# Patient Record
Sex: FEMALE | Race: WHITE | NOT HISPANIC OR LATINO | Employment: UNEMPLOYED | URBAN - NONMETROPOLITAN AREA
[De-identification: names, ages, dates, MRNs, and addresses within clinical notes are randomized per-mention and may not be internally consistent; named-entity substitution may affect disease eponyms.]

---

## 2017-11-16 ENCOUNTER — HOSPITAL ENCOUNTER (EMERGENCY)
Facility: HOSPITAL | Age: 22
Discharge: HOME | End: 2017-11-16
Attending: EMERGENCY MEDICINE | Admitting: EMERGENCY MEDICINE

## 2017-11-16 VITALS
TEMPERATURE: 98 F | RESPIRATION RATE: 18 BRPM | HEIGHT: 65 IN | HEART RATE: 66 BPM | DIASTOLIC BLOOD PRESSURE: 78 MMHG | OXYGEN SATURATION: 100 % | SYSTOLIC BLOOD PRESSURE: 121 MMHG | WEIGHT: 220 LBS | BODY MASS INDEX: 36.65 KG/M2

## 2017-11-16 DIAGNOSIS — R42 DIZZINESS: Primary | ICD-10-CM

## 2017-11-16 LAB
B-HCG UR QL: NEGATIVE
BACTERIA UR QL AUTO: ABNORMAL /HPF
BILIRUB UR QL STRIP: NEGATIVE
CLARITY UR: ABNORMAL
COLOR UR: YELLOW
GLUCOSE UR STRIP-MCNC: NEGATIVE MG/DL
HGB UR QL STRIP.AUTO: ABNORMAL
HYALINE CASTS UR QL AUTO: ABNORMAL /LPF
KETONES UR QL STRIP: NEGATIVE
LEUKOCYTE ESTERASE UR QL STRIP.AUTO: NEGATIVE
NITRITE UR QL STRIP: NEGATIVE
PH UR STRIP.AUTO: 6.5 [PH] (ref 5–8)
PROT UR QL STRIP: ABNORMAL
RBC # UR: ABNORMAL /HPF
REF LAB TEST METHOD: ABNORMAL
SP GR UR STRIP: 1.02 (ref 1–1.03)
SQUAMOUS #/AREA URNS HPF: ABNORMAL /HPF
UROBILINOGEN UR QL STRIP: ABNORMAL
WBC UR QL AUTO: ABNORMAL /HPF

## 2017-11-16 PROCEDURE — 99284 EMERGENCY DEPT VISIT MOD MDM: CPT

## 2017-11-16 PROCEDURE — 81025 URINE PREGNANCY TEST: CPT | Performed by: EMERGENCY MEDICINE

## 2017-11-16 PROCEDURE — 93005 ELECTROCARDIOGRAM TRACING: CPT | Performed by: EMERGENCY MEDICINE

## 2017-11-16 PROCEDURE — 81001 URINALYSIS AUTO W/SCOPE: CPT | Performed by: EMERGENCY MEDICINE

## 2017-11-16 RX ORDER — ONDANSETRON 4 MG/1
4 TABLET, ORALLY DISINTEGRATING ORAL EVERY 6 HOURS PRN
Qty: 20 TABLET | Refills: 0 | Status: SHIPPED | OUTPATIENT
Start: 2017-11-16 | End: 2018-07-05

## 2017-11-16 RX ORDER — MECLIZINE HYDROCHLORIDE 25 MG/1
50 TABLET ORAL ONCE
Status: COMPLETED | OUTPATIENT
Start: 2017-11-16 | End: 2017-11-16

## 2017-11-16 RX ORDER — ONDANSETRON 4 MG/1
4 TABLET, ORALLY DISINTEGRATING ORAL ONCE
Status: COMPLETED | OUTPATIENT
Start: 2017-11-16 | End: 2017-11-16

## 2017-11-16 RX ORDER — MECLIZINE HYDROCHLORIDE 25 MG/1
50 TABLET ORAL 3 TIMES DAILY PRN
Qty: 20 TABLET | Refills: 0 | Status: SHIPPED | OUTPATIENT
Start: 2017-11-16 | End: 2018-07-05

## 2017-11-16 RX ADMIN — MECLIZINE HYDROCHLORIDE 50 MG: 25 TABLET ORAL at 12:04

## 2017-11-16 RX ADMIN — ONDANSETRON 4 MG: 4 TABLET, ORALLY DISINTEGRATING ORAL at 12:03

## 2017-11-16 NOTE — ED PROVIDER NOTES
"Subjective   HPI Comments: 22-year-old female presenting with dizziness and concerned about her blood pressure.  She states that prior to arrival she was sitting at work when she had sudden onset of dizziness, nausea.  Her coworker who is a CNA checked her blood pressure and it was 140/109.  They looked this up on the Internet and found that this was \"an extreme emergency and we needed to get help immediately\".  This made her very anxious so she came here for evaluation.  She is feeling somewhat better at this time.  She denies any headache, vomiting, chest pain, shortness of breath, numbness, weakness, neck pain.  She is never had symptoms like this before.  She does tell me that she is currently getting over a cold, it she's had congestion and cough for about a week.      Review of Systems   Constitutional: Negative for chills and fever.   HENT: Positive for congestion and rhinorrhea. Negative for sore throat.    Eyes: Negative for pain.   Respiratory: Negative for cough and shortness of breath.    Cardiovascular: Negative for chest pain, palpitations and leg swelling.   Gastrointestinal: Positive for nausea. Negative for abdominal pain, diarrhea and vomiting.   Genitourinary: Negative for dysuria.   Musculoskeletal: Negative for arthralgias.   Skin: Negative for rash.   Neurological: Positive for dizziness and light-headedness. Negative for seizures, syncope, facial asymmetry, speech difficulty, weakness, numbness and headaches.   Psychiatric/Behavioral: Negative for behavioral problems.       Past Medical History:   Diagnosis Date   • UTI (urinary tract infection)        No Known Allergies    History reviewed. No pertinent surgical history.    History reviewed. No pertinent family history.    Social History     Social History   • Marital status: Single     Spouse name: N/A   • Number of children: N/A   • Years of education: N/A     Social History Main Topics   • Smoking status: Never Smoker   • Smokeless " tobacco: None   • Alcohol use Yes      Comment: socially    • Drug use: No   • Sexual activity: Not Asked     Other Topics Concern   • None     Social History Narrative   • None           Objective   Physical Exam   Constitutional: She is oriented to person, place, and time. She appears well-developed and well-nourished. No distress.   HENT:   Head: Normocephalic and atraumatic.   Right Ear: External ear normal.   Left Ear: External ear normal.   Nose: Nose normal.   Mouth/Throat: Oropharynx is clear and moist.   TMs with small serous effusions, mild congestion   Eyes: Conjunctivae and EOM are normal. Pupils are equal, round, and reactive to light.   Neck: Normal range of motion. Neck supple.   Cardiovascular: Normal rate, regular rhythm, normal heart sounds and intact distal pulses.    Pulmonary/Chest: Effort normal and breath sounds normal. No respiratory distress.   Abdominal: Soft. Bowel sounds are normal. She exhibits no distension. There is no tenderness. There is no rebound and no guarding.   Musculoskeletal: Normal range of motion. She exhibits no edema, tenderness or deformity.   Neurological: She is alert and oriented to person, place, and time.   CN intact, normal strength and sensation bilateral upper and lower extremities, coordination is normal, HINTs exam unremarkable   Skin: Skin is warm and dry. No rash noted.   Psychiatric: She has a normal mood and affect. Her behavior is normal.   Nursing note and vitals reviewed.      Procedures         ED Course  ED Course                  MDM  Number of Diagnoses or Management Options  Dizziness:   Diagnosis management comments: 22-year-old female with dizziness and questionable hypertension.  We'll develop, well-nourished obese young female in no distress with normal vital signs here including a normal blood pressure, her exam is otherwise nonfocal including her neurologic exam.  Her story is consistent with some sort of peripheral vertigo.  I think this is  likely due to her upper respiratory infection.  We'll treat symptomatically and check a pregnancy test.  At this time I feel no other imaging or evaluation is warranted.  Disposition is likely to home.    DDX: BPPV, viral illness, vertigo, hypertension    EKG: Sinus rhythm, normal rate, normal axis/intervals, no acute ST/T changes    She is feeling much better.  At this time until she is safe for discharge home.  Encouraged her to follow closely with her primary doctor.  Return precautions discussed.      Final diagnoses:   Dizziness            Anton Martinez MD  11/16/17 2892

## 2017-11-16 NOTE — DISCHARGE INSTRUCTIONS

## 2018-06-06 ENCOUNTER — TELEPHONE (OUTPATIENT)
Dept: URGENT CARE | Facility: CLINIC | Age: 23
End: 2018-06-06

## 2018-06-06 DIAGNOSIS — A59.01 TRICHOMONAS VAGINITIS: Primary | ICD-10-CM

## 2018-06-06 RX ORDER — METRONIDAZOLE 500 MG/1
500 TABLET ORAL 2 TIMES DAILY
Qty: 14 TABLET | Refills: 0 | Status: SHIPPED | OUTPATIENT
Start: 2018-06-06 | End: 2018-07-05

## 2018-07-16 ENCOUNTER — TELEPHONE (OUTPATIENT)
Dept: URGENT CARE | Facility: CLINIC | Age: 23
End: 2018-07-16

## 2018-07-16 DIAGNOSIS — A59.9 TRICHOMONAS INFECTION: Primary | ICD-10-CM

## 2018-07-16 DIAGNOSIS — B37.31 VAGINAL CANDIDIASIS: ICD-10-CM

## 2018-07-16 RX ORDER — FLUCONAZOLE 150 MG/1
TABLET ORAL
Qty: 3 TABLET | Refills: 0 | Status: SHIPPED | OUTPATIENT
Start: 2018-07-16 | End: 2018-09-18

## 2018-07-16 RX ORDER — METRONIDAZOLE 500 MG/1
500 TABLET ORAL 2 TIMES DAILY
Qty: 14 TABLET | Refills: 0 | Status: SHIPPED | OUTPATIENT
Start: 2018-07-16 | End: 2018-09-18

## 2018-07-20 ENCOUNTER — TELEPHONE (OUTPATIENT)
Dept: URGENT CARE | Facility: CLINIC | Age: 23
End: 2018-07-20

## 2018-09-18 ENCOUNTER — OFFICE VISIT (OUTPATIENT)
Dept: INTERNAL MEDICINE | Facility: CLINIC | Age: 23
End: 2018-09-18

## 2018-09-18 VITALS
WEIGHT: 204 LBS | SYSTOLIC BLOOD PRESSURE: 108 MMHG | HEART RATE: 82 BPM | OXYGEN SATURATION: 99 % | DIASTOLIC BLOOD PRESSURE: 76 MMHG | BODY MASS INDEX: 34.83 KG/M2 | TEMPERATURE: 98.7 F | HEIGHT: 64 IN

## 2018-09-18 DIAGNOSIS — N89.8 VAGINAL DISCHARGE: Primary | ICD-10-CM

## 2018-09-18 DIAGNOSIS — R30.0 DYSURIA: ICD-10-CM

## 2018-09-18 DIAGNOSIS — N92.6 IRREGULAR MENSES: ICD-10-CM

## 2018-09-18 DIAGNOSIS — E28.2 PCOS (POLYCYSTIC OVARIAN SYNDROME): ICD-10-CM

## 2018-09-18 DIAGNOSIS — Z86.19 HISTORY OF TRICHOMONAL VAGINITIS: ICD-10-CM

## 2018-09-18 DIAGNOSIS — Z00.00 PREVENTATIVE HEALTH CARE: ICD-10-CM

## 2018-09-18 LAB
BILIRUB BLD-MCNC: NEGATIVE MG/DL
CLARITY, POC: ABNORMAL
COLOR UR: YELLOW
GLUCOSE UR STRIP-MCNC: NEGATIVE MG/DL
KETONES UR QL: NEGATIVE
LEUKOCYTE EST, POC: ABNORMAL
NITRITE UR-MCNC: NEGATIVE MG/ML
PH UR: 6.5 [PH] (ref 5–8)
PROT UR STRIP-MCNC: ABNORMAL MG/DL
RBC # UR STRIP: ABNORMAL /UL
SP GR UR: 1.01 (ref 1–1.03)
UROBILINOGEN UR QL: NORMAL

## 2018-09-18 PROCEDURE — 81003 URINALYSIS AUTO W/O SCOPE: CPT | Performed by: PHYSICIAN ASSISTANT

## 2018-09-18 PROCEDURE — 99214 OFFICE O/P EST MOD 30 MIN: CPT | Performed by: PHYSICIAN ASSISTANT

## 2018-09-18 RX ORDER — TINIDAZOLE 500 MG/1
2 TABLET ORAL
Qty: 28 TABLET | Refills: 0 | Status: SHIPPED | OUTPATIENT
Start: 2018-09-18 | End: 2018-09-25

## 2018-09-18 NOTE — PROGRESS NOTES
Nery Ibarra is a 23 y.o. female.     Subjective   History of Present Illness   Here today with concern of dysuria, urinary frequency and green vaginal discharge for the last 5 days.  She denies vaginal odor.  She reports that she has had trichomonas 3 times in the last 4 months which has been treated with Flagyl PO each time.  Her boyfriend has been tested and was negative for trichomonas so he has not been treated. They have been using condoms 100% of the time since they got back together after she contracted trichomonas initially.     Taking Metformin for PCOS since February which was diagnosed after transvaginal ultrasound. She reports that initially Metformin seemed to regulate her menstrual cycle but that did not last long and she has resumed irregularly timed menses. Last menses was a couple of weeks ago and was abnormal in that she only had some brown spotting with her normal menstrual symptoms.           The following portions of the patient's history were reviewed and updated as appropriate: allergies, current medications, past family history, past medical history, past social history, past surgical history and problem list.    Review of Systems    Constitutional: Negative for appetite change, chills, fatigue, fever and unexpected weight change.   Respiratory: Negative for cough, chest tightness, shortness of breath and wheezing.    Cardiovascular: Negative for chest pain, palpitations and leg swelling.   Gastrointestinal: Negative for abdominal distention, abdominal pain, blood in stool, constipation, diarrhea, nausea and vomiting.   Endocrine: irregular menses.  Negative for cold intolerance, heat intolerance, polydipsia, polyphagia and polyuria.   Musculoskeletal: Negative for arthralgias, back pain, joint swelling, myalgias, neck pain and neck stiffness.   Skin: Negative for color change, pallor, rash and wound.   Allergic/Immunologic: Negative for environmental allergies, food allergies and  "immunocompromised state.   Neurological: Negative for dizziness, tremors, seizures, weakness, numbness and headaches.   Hematological: Negative for adenopathy. Does not bruise/bleed easily.   Psychiatric/Behavioral: Negative for sleep disturbances, agitation, behavioral problems, confusion, hallucinations, self-injury and suicidal ideas. The patient is not nervous/anxious.    : vaginal discharge, dysuria, frequency.     Objective    Physical Exam  Constitutional: Appears well-developed and well-nourished.   Cardiovascular: Normal rate, regular rhythm and normal heart sounds.    Pulmonary/Chest: Effort normal and breath sounds normal. No respiratory distress.  Has no wheezes or rales. Exhibits no chest wall tenderness.   Abdominal: Soft. Bowel sounds are normal. Exhibits no distension and no mass. There is no tenderness.   Musculoskeletal: Normal range of motion. Exhibits no tenderness.   Neurological: Alert and oriented to person, place, and time.   Skin: Skin is warm and dry.   Psychiatric: Has a normal mood and affect. Behavior is normal. Judgment and thought content normal.       /76   Pulse 82   Temp 98.7 °F (37.1 °C)   Ht 162.6 cm (64.02\")   Wt 92.5 kg (204 lb)   SpO2 99%   BMI 35.00 kg/m²     Nursing note and vitals reviewed.        Assessment/Plan   Nery was seen today for uti symptoms.    Diagnoses and all orders for this visit:    Vaginal discharge  -     tinidazole (TINDAMAX) 500 MG tablet; Take 4 tablets by mouth Daily With Breakfast for 7 days.  -     NuSwab VG+ - Swab, Vagina    History of trichomonal vaginitis  -     tinidazole (TINDAMAX) 500 MG tablet; Take 4 tablets by mouth Daily With Breakfast for 7 days.  -     NuSwab VG+ - Swab, Vagina  This will make the 4th time in the last 4 months. She was advised that despite her boyfriend testing negative he still needs to be treated and they should wait 7 days after both have completed their treatment before resuming intercourse. "       Dysuria  -     POCT urinalysis dipstick, automated  500 leukocytes, 250 blood.   -     Urine Culture - Urine, Urine, Clean Catch  Brief Urine Lab Results  (Last result in the past 365 days)      Color   Clarity   Blood   Leuk Est   Nitrite   Protein   CREAT   Urine HCG        09/18/18 1736 Yellow Cloudy(A) 50 [place]/ul(A) 500 [place]/ul(A) Negative Trace(A)               PCOS (polycystic ovarian syndrome)  -     Continue: metFORMIN (GLUCOPHAGE) 500 MG tablet; Take 1 tablet by mouth 2 (Two) Times a Day With Meals.  -     Comprehensive Metabolic Panel  -     Testosterone  -     Insulin, Total  -     Hemoglobin A1c  -     TSH    Preventative health care  -     Lipid Panel    Irregular menses  -     TSH

## 2018-09-24 DIAGNOSIS — A59.9 TRICHOMONIASIS: Primary | ICD-10-CM

## 2018-09-24 LAB
A VAGINAE DNA VAG QL NAA+PROBE: ABNORMAL SCORE
BACTERIA UR CULT: ABNORMAL
BACTERIA UR CULT: ABNORMAL
BVAB2 DNA VAG QL NAA+PROBE: ABNORMAL SCORE
C ALBICANS DNA VAG QL NAA+PROBE: NEGATIVE
C GLABRATA DNA VAG QL NAA+PROBE: NEGATIVE
C TRACH RRNA SPEC QL NAA+PROBE: NEGATIVE
MEGA1 DNA VAG QL NAA+PROBE: ABNORMAL SCORE
N GONORRHOEA RRNA SPEC QL NAA+PROBE: NEGATIVE
OTHER ANTIBIOTIC SUSC ISLT: ABNORMAL
T VAGINALIS RRNA SPEC QL NAA+PROBE: POSITIVE

## 2018-09-27 ENCOUNTER — TELEPHONE (OUTPATIENT)
Dept: INTERNAL MEDICINE | Facility: CLINIC | Age: 23
End: 2018-09-27

## 2018-10-01 NOTE — TELEPHONE ENCOUNTER
Pt said it only happened 2 times that day, she is fine now, told her to call if any other problems

## 2018-10-08 ENCOUNTER — TELEPHONE (OUTPATIENT)
Dept: INTERNAL MEDICINE | Facility: CLINIC | Age: 23
End: 2018-10-08

## 2018-10-18 ENCOUNTER — APPOINTMENT (OUTPATIENT)
Dept: ULTRASOUND IMAGING | Facility: HOSPITAL | Age: 23
End: 2018-10-18

## 2018-10-18 ENCOUNTER — HOSPITAL ENCOUNTER (EMERGENCY)
Facility: HOSPITAL | Age: 23
Discharge: HOME | End: 2018-10-18
Attending: EMERGENCY MEDICINE | Admitting: EMERGENCY MEDICINE

## 2018-10-18 ENCOUNTER — TELEPHONE (OUTPATIENT)
Dept: INTERNAL MEDICINE | Facility: CLINIC | Age: 23
End: 2018-10-18

## 2018-10-18 VITALS
TEMPERATURE: 97.8 F | HEART RATE: 71 BPM | WEIGHT: 210 LBS | OXYGEN SATURATION: 99 % | RESPIRATION RATE: 17 BRPM | SYSTOLIC BLOOD PRESSURE: 138 MMHG | DIASTOLIC BLOOD PRESSURE: 92 MMHG | BODY MASS INDEX: 35.85 KG/M2 | HEIGHT: 64 IN

## 2018-10-18 DIAGNOSIS — R10.13 EPIGASTRIC PAIN: Primary | ICD-10-CM

## 2018-10-18 LAB
ALBUMIN SERPL-MCNC: 5 G/DL (ref 3.5–5)
ALBUMIN/GLOB SERPL: 1.5 G/DL (ref 1–2)
ALP SERPL-CCNC: 65 U/L (ref 38–126)
ALT SERPL W P-5'-P-CCNC: 44 U/L (ref 13–69)
ANION GAP SERPL CALCULATED.3IONS-SCNC: 11.9 MMOL/L (ref 10–20)
AST SERPL-CCNC: 38 U/L (ref 15–46)
B-HCG UR QL: NEGATIVE
BACTERIA UR QL AUTO: ABNORMAL /HPF
BASOPHILS # BLD AUTO: 0.09 10*3/MM3 (ref 0–0.2)
BASOPHILS NFR BLD AUTO: 0.8 % (ref 0–2.5)
BILIRUB SERPL-MCNC: 0.7 MG/DL (ref 0.2–1.3)
BILIRUB UR QL STRIP: ABNORMAL
BUN BLD-MCNC: 11 MG/DL (ref 7–20)
BUN/CREAT SERPL: 15.7 (ref 7.1–23.5)
CALCIUM SPEC-SCNC: 10.2 MG/DL (ref 8.4–10.2)
CHLORIDE SERPL-SCNC: 107 MMOL/L (ref 98–107)
CLARITY UR: ABNORMAL
CO2 SERPL-SCNC: 25 MMOL/L (ref 26–30)
COLOR UR: YELLOW
CREAT BLD-MCNC: 0.7 MG/DL (ref 0.6–1.3)
DEPRECATED RDW RBC AUTO: 36 FL (ref 37–54)
EOSINOPHIL # BLD AUTO: 1.15 10*3/MM3 (ref 0–0.7)
EOSINOPHIL NFR BLD AUTO: 10 % (ref 0–7)
ERYTHROCYTE [DISTWIDTH] IN BLOOD BY AUTOMATED COUNT: 11.6 % (ref 11.5–14.5)
GFR SERPL CREATININE-BSD FRML MDRD: 105 ML/MIN/1.73
GLOBULIN UR ELPH-MCNC: 3.3 GM/DL
GLUCOSE BLD-MCNC: 99 MG/DL (ref 74–98)
GLUCOSE UR STRIP-MCNC: NEGATIVE MG/DL
HCT VFR BLD AUTO: 44.1 % (ref 37–47)
HGB BLD-MCNC: 15.3 G/DL (ref 12–16)
HGB UR QL STRIP.AUTO: ABNORMAL
HYALINE CASTS UR QL AUTO: ABNORMAL /LPF
IMM GRANULOCYTES # BLD: 0.03 10*3/MM3 (ref 0–0.06)
IMM GRANULOCYTES NFR BLD: 0.3 % (ref 0–0.6)
KETONES UR QL STRIP: ABNORMAL
LEUKOCYTE ESTERASE UR QL STRIP.AUTO: NEGATIVE
LIPASE SERPL-CCNC: 91 U/L (ref 23–300)
LYMPHOCYTES # BLD AUTO: 3.73 10*3/MM3 (ref 0.6–3.4)
LYMPHOCYTES NFR BLD AUTO: 32.5 % (ref 10–50)
MCH RBC QN AUTO: 29.7 PG (ref 27–31)
MCHC RBC AUTO-ENTMCNC: 34.7 G/DL (ref 30–37)
MCV RBC AUTO: 85.5 FL (ref 81–99)
MONOCYTES # BLD AUTO: 0.72 10*3/MM3 (ref 0–0.9)
MONOCYTES NFR BLD AUTO: 6.3 % (ref 0–12)
NEUTROPHILS # BLD AUTO: 5.76 10*3/MM3 (ref 2–6.9)
NEUTROPHILS NFR BLD AUTO: 50.1 % (ref 37–80)
NITRITE UR QL STRIP: NEGATIVE
NRBC BLD MANUAL-RTO: 0 /100 WBC (ref 0–0)
PH UR STRIP.AUTO: 5.5 [PH] (ref 5–8)
PLATELET # BLD AUTO: 318 10*3/MM3 (ref 130–400)
PMV BLD AUTO: 9.9 FL (ref 6–12)
POTASSIUM BLD-SCNC: 3.9 MMOL/L (ref 3.5–5.1)
PROT SERPL-MCNC: 8.3 G/DL (ref 6.3–8.2)
PROT UR QL STRIP: ABNORMAL
RBC # BLD AUTO: 5.16 10*6/MM3 (ref 4.2–5.4)
RBC # UR: ABNORMAL /HPF
REF LAB TEST METHOD: ABNORMAL
SODIUM BLD-SCNC: 140 MMOL/L (ref 137–145)
SP GR UR STRIP: >=1.03 (ref 1–1.03)
SQUAMOUS #/AREA URNS HPF: ABNORMAL /HPF
UROBILINOGEN UR QL STRIP: ABNORMAL
WBC NRBC COR # BLD: 11.48 10*3/MM3 (ref 4.8–10.8)
WBC UR QL AUTO: ABNORMAL /HPF

## 2018-10-18 PROCEDURE — 80053 COMPREHEN METABOLIC PANEL: CPT | Performed by: EMERGENCY MEDICINE

## 2018-10-18 PROCEDURE — 25010000002 ONDANSETRON PER 1 MG: Performed by: EMERGENCY MEDICINE

## 2018-10-18 PROCEDURE — 99283 EMERGENCY DEPT VISIT LOW MDM: CPT

## 2018-10-18 PROCEDURE — 96375 TX/PRO/DX INJ NEW DRUG ADDON: CPT

## 2018-10-18 PROCEDURE — 83690 ASSAY OF LIPASE: CPT | Performed by: EMERGENCY MEDICINE

## 2018-10-18 PROCEDURE — 81001 URINALYSIS AUTO W/SCOPE: CPT | Performed by: EMERGENCY MEDICINE

## 2018-10-18 PROCEDURE — 76705 ECHO EXAM OF ABDOMEN: CPT

## 2018-10-18 PROCEDURE — 96374 THER/PROPH/DIAG INJ IV PUSH: CPT

## 2018-10-18 PROCEDURE — 81025 URINE PREGNANCY TEST: CPT | Performed by: EMERGENCY MEDICINE

## 2018-10-18 PROCEDURE — 85025 COMPLETE CBC W/AUTO DIFF WBC: CPT | Performed by: EMERGENCY MEDICINE

## 2018-10-18 RX ORDER — FAMOTIDINE 10 MG/ML
20 INJECTION, SOLUTION INTRAVENOUS ONCE
Status: COMPLETED | OUTPATIENT
Start: 2018-10-18 | End: 2018-10-18

## 2018-10-18 RX ORDER — ONDANSETRON 2 MG/ML
4 INJECTION INTRAMUSCULAR; INTRAVENOUS ONCE
Status: COMPLETED | OUTPATIENT
Start: 2018-10-18 | End: 2018-10-18

## 2018-10-18 RX ORDER — FAMOTIDINE 20 MG/1
20 TABLET, FILM COATED ORAL ONCE
Status: DISCONTINUED | OUTPATIENT
Start: 2018-10-18 | End: 2018-10-18

## 2018-10-18 RX ORDER — FAMOTIDINE 40 MG/1
40 TABLET, FILM COATED ORAL DAILY
Qty: 14 TABLET | Refills: 0 | Status: SHIPPED | OUTPATIENT
Start: 2018-10-18 | End: 2018-10-18

## 2018-10-18 RX ORDER — ALUMINA, MAGNESIA, AND SIMETHICONE 2400; 2400; 240 MG/30ML; MG/30ML; MG/30ML
15 SUSPENSION ORAL ONCE
Status: COMPLETED | OUTPATIENT
Start: 2018-10-18 | End: 2018-10-18

## 2018-10-18 RX ADMIN — ALUMINUM HYDROXIDE, MAGNESIUM HYDROXIDE, AND DIMETHICONE 15 ML: 400; 400; 40 SUSPENSION ORAL at 15:19

## 2018-10-18 RX ADMIN — LIDOCAINE HYDROCHLORIDE 15 ML: 20 SOLUTION ORAL; TOPICAL at 15:18

## 2018-10-18 RX ADMIN — ONDANSETRON 4 MG: 2 INJECTION INTRAMUSCULAR; INTRAVENOUS at 15:43

## 2018-10-18 RX ADMIN — FAMOTIDINE 20 MG: 10 INJECTION, SOLUTION INTRAVENOUS at 16:29

## 2018-10-18 NOTE — ED PROVIDER NOTES
Subjective   History of Present Illness  23-year-old female otherwise healthy presenting with 3 days of constant, heaviness in her epigastrium and right upper quadrant with associated nausea.  No other associated symptoms.  Never had similar symptoms previously.  Currently on her menstrual period.  No prior abdominal surgeries.    Review of Systems   Gastrointestinal: Positive for abdominal pain and nausea.   All other systems reviewed and are negative.      Past Medical History:   Diagnosis Date   • UTI (urinary tract infection)        No Known Allergies    History reviewed. No pertinent surgical history.    Family History   Problem Relation Age of Onset   • Adopted: Yes       Social History     Social History   • Marital status: Single     Social History Main Topics   • Smoking status: Never Smoker   • Smokeless tobacco: Never Used   • Alcohol use Yes      Comment: socially    • Drug use: No     Other Topics Concern   • Not on file           Objective   Physical Exam   Constitutional: She is oriented to person, place, and time. She appears well-developed and well-nourished. No distress.   HENT:   Head: Normocephalic.   Mouth/Throat: Oropharynx is clear and moist. No oropharyngeal exudate.   Eyes: No scleral icterus.   Neck: Neck supple. No tracheal deviation present.   Cardiovascular: Normal rate, regular rhythm, normal heart sounds and intact distal pulses.  Exam reveals no gallop and no friction rub.    No murmur heard.  Pulmonary/Chest: Effort normal and breath sounds normal. No stridor. No respiratory distress. She has no wheezes. She has no rales. She exhibits no tenderness.   Abdominal: Soft. She exhibits no distension and no mass. There is tenderness (epigastrum). There is no rebound and no guarding. No hernia.   Musculoskeletal: She exhibits no edema or deformity.   Neurological: She is alert and oriented to person, place, and time.   Skin: Skin is warm and dry. She is not diaphoretic. No erythema. No  pallor.   Psychiatric: She has a normal mood and affect. Her behavior is normal.   Nursing note and vitals reviewed.      Procedures           ED Course                  MDM  23-year-old female here with epigastric pain.  Afebrile, vital signs stable.  Tender to palpation in her epigastrium.  Low suspicion for emergent or surgical abdominal process.  May have some element of biliary colic versus gastritis versus esophagitis versus other. No hx to suggest cardiac cause. Plan for labs, ultrasound gallbladder, symptomatically treatment and reassessment.    4:10 PM labs and ultrasound are unremarkable.  Patient does have some bacteria in the urine but appears contaminated and there are no white blood cells.  Pain is improving.  Given the reassuring workup, exam, history, will discharge home with strict return to care precautions and prescription for Pepcid.    Final diagnoses:   Epigastric pain            Massimo Dick MD  10/18/18 0251

## 2019-02-04 ENCOUNTER — APPOINTMENT (OUTPATIENT)
Dept: ULTRASOUND IMAGING | Facility: HOSPITAL | Age: 24
End: 2019-02-04

## 2019-02-04 ENCOUNTER — HOSPITAL ENCOUNTER (EMERGENCY)
Facility: HOSPITAL | Age: 24
Discharge: HOME | End: 2019-02-04
Attending: EMERGENCY MEDICINE | Admitting: EMERGENCY MEDICINE

## 2019-02-04 VITALS
RESPIRATION RATE: 16 BRPM | DIASTOLIC BLOOD PRESSURE: 78 MMHG | BODY MASS INDEX: 36.89 KG/M2 | TEMPERATURE: 98 F | HEART RATE: 71 BPM | HEIGHT: 65 IN | SYSTOLIC BLOOD PRESSURE: 118 MMHG | OXYGEN SATURATION: 99 % | WEIGHT: 221.4 LBS

## 2019-02-04 DIAGNOSIS — O36.80X0 PREGNANCY OF UNKNOWN ANATOMIC LOCATION: Primary | ICD-10-CM

## 2019-02-04 LAB
BACTERIA UR QL AUTO: ABNORMAL /HPF
BILIRUB UR QL STRIP: NEGATIVE
CLARITY UR: ABNORMAL
COLOR UR: YELLOW
GLUCOSE UR STRIP-MCNC: NEGATIVE MG/DL
HCG INTACT+B SERPL-ACNC: 369.12 MIU/ML
HGB UR QL STRIP.AUTO: NEGATIVE
HYALINE CASTS UR QL AUTO: ABNORMAL /LPF
KETONES UR QL STRIP: NEGATIVE
LEUKOCYTE ESTERASE UR QL STRIP.AUTO: NEGATIVE
MUCOUS THREADS URNS QL MICRO: ABNORMAL /HPF
NITRITE UR QL STRIP: NEGATIVE
PH UR STRIP.AUTO: 6.5 [PH] (ref 5–8)
PROT UR QL STRIP: ABNORMAL
RBC # UR: ABNORMAL /HPF
REF LAB TEST METHOD: ABNORMAL
SP GR UR STRIP: 1.02 (ref 1–1.03)
SQUAMOUS #/AREA URNS HPF: ABNORMAL /HPF
UROBILINOGEN UR QL STRIP: ABNORMAL
WBC UR QL AUTO: ABNORMAL /HPF

## 2019-02-04 PROCEDURE — 36415 COLL VENOUS BLD VENIPUNCTURE: CPT

## 2019-02-04 PROCEDURE — 99283 EMERGENCY DEPT VISIT LOW MDM: CPT

## 2019-02-04 PROCEDURE — 81001 URINALYSIS AUTO W/SCOPE: CPT | Performed by: EMERGENCY MEDICINE

## 2019-02-04 PROCEDURE — 84702 CHORIONIC GONADOTROPIN TEST: CPT | Performed by: EMERGENCY MEDICINE

## 2019-02-04 PROCEDURE — 76817 TRANSVAGINAL US OBSTETRIC: CPT

## 2019-02-04 NOTE — ED PROVIDER NOTES
Subjective   History of Present Illness   23F G1 (LMP 12/24) presenting with 2 days of low crampy lower abdominal pain and a positive pregnancy test last night.  Denies any other specific symptoms.  Essentially, denies any fevers, chills, nausea, vomiting, diarrhea, dysuria, frequency, vaginal bleeding or discharge.    Review of Systems   Gastrointestinal: Positive for abdominal pain.   All other systems reviewed and are negative.      Past Medical History:   Diagnosis Date   • PCOS (polycystic ovarian syndrome)    • UTI (urinary tract infection)        No Known Allergies    History reviewed. No pertinent surgical history.    Family History   Adopted: Yes       Social History     Socioeconomic History   • Marital status: Single     Spouse name: Not on file   • Number of children: Not on file   • Years of education: Not on file   • Highest education level: Not on file   Tobacco Use   • Smoking status: Never Smoker   • Smokeless tobacco: Never Used   Substance and Sexual Activity   • Alcohol use: Yes     Comment: socially    • Drug use: No           Objective   Physical Exam   Constitutional: She is oriented to person, place, and time. She appears well-developed and well-nourished.  Non-toxic appearance. She does not appear ill. No distress.   HENT:   Head: Normocephalic.   Mouth/Throat: Oropharynx is clear and moist. No oropharyngeal exudate.   Eyes: Conjunctivae are normal. No scleral icterus.   Neck: Neck supple. No tracheal deviation present.   Cardiovascular: Normal rate, regular rhythm, normal heart sounds and intact distal pulses. Exam reveals no gallop and no friction rub.   No murmur heard.  Pulmonary/Chest: Effort normal and breath sounds normal. No stridor. No respiratory distress. She has no wheezes. She has no rales. She exhibits no tenderness.   Abdominal: Soft. She exhibits no distension and no mass. There is no tenderness. There is no rebound and no guarding. No hernia.   Musculoskeletal: She exhibits  no edema or deformity.   Neurological: She is alert and oriented to person, place, and time.   Skin: Skin is warm and dry. She is not diaphoretic. No erythema. No pallor.   Psychiatric: She has a normal mood and affect. Her behavior is normal.   Nursing note and vitals reviewed.      Procedures           ED Course                  MDM  23-year-old female here with lower abdominal pain and the setting of early prexy.  Afebrile, vital signs stable.  Nontender abdomen.  Very low suspicion for any emergent cause of her pain.  However, will get an hCG, ultrasound, urinalysis, and reassess.    1:20 PM . US non-diagnostic.  Discussed with the patient that this could be a miscarriage, early pregnancy, potential ectopic pregnancy and that she needs to call and follow-up with an OB/GYN within the next 2-3 days.  Discussed strict return care precautions and will discharge home.    Final diagnoses:   Pregnancy of unknown anatomic location            Massimo Dick MD  02/04/19 0081

## 2019-02-04 NOTE — DISCHARGE INSTRUCTIONS
Your HCG levels are so low that we cannot yet determine if this is a normal pregnancy or not. You need to call Dr. Morataya to arrange follow up to get your HCG level checked again to make sure that it is increasing appropriately. They may want to do a follow up ultrasound as well.

## 2019-02-22 ENCOUNTER — INITIAL PRENATAL (OUTPATIENT)
Dept: OBSTETRICS AND GYNECOLOGY | Facility: CLINIC | Age: 24
End: 2019-02-22

## 2019-02-22 VITALS — SYSTOLIC BLOOD PRESSURE: 120 MMHG | BODY MASS INDEX: 36.94 KG/M2 | WEIGHT: 222 LBS | DIASTOLIC BLOOD PRESSURE: 60 MMHG

## 2019-02-22 DIAGNOSIS — O36.80X0 ENCOUNTER TO DETERMINE FETAL VIABILITY OF PREGNANCY, SINGLE OR UNSPECIFIED FETUS: Primary | ICD-10-CM

## 2019-02-22 DIAGNOSIS — Z3A.01 LESS THAN 8 WEEKS GESTATION OF PREGNANCY: ICD-10-CM

## 2019-02-22 DIAGNOSIS — Z34.91 NORMAL PREGNANCY, FIRST TRIMESTER: ICD-10-CM

## 2019-02-22 PROCEDURE — 99203 OFFICE O/P NEW LOW 30 MIN: CPT | Performed by: NURSE PRACTITIONER

## 2019-02-22 RX ORDER — PROMETHAZINE HYDROCHLORIDE 12.5 MG/1
12.5 TABLET ORAL EVERY 6 HOURS PRN
Qty: 30 TABLET | Refills: 0 | Status: SHIPPED | OUTPATIENT
Start: 2019-02-22 | End: 2019-05-19 | Stop reason: SDUPTHER

## 2019-02-22 NOTE — PATIENT INSTRUCTIONS
First Trimester of Pregnancy    The first trimester of pregnancy is from week 1 until the end of week 12 (months 1 through 3). During this time, your baby will begin to develop inside you. At 6-8 weeks, the eyes and face are formed, and the heartbeat can be seen on ultrasound. At the end of 12 weeks, all the baby's organs are formed. Prenatal care is all the medical care you receive before the birth of your baby. Make sure you get good prenatal care and follow all of your doctor's instructions.  HOME CARE   Medicines  · Take medicine only as told by your doctor. Some medicines are safe and some are not during pregnancy.  · Take your prenatal vitamins as told by your doctor.  · Take medicine that helps you poop (stool softener) as needed if your doctor says it is okay.  Diet  · Eat regular, healthy meals.  · Your doctor will tell you the amount of weight gain that is right for you.  · Avoid raw meat and uncooked cheese.  · If you feel sick to your stomach (nauseous) or throw up (vomit):  ¨ Eat 4 or 5 small meals a day instead of 3 large meals.  ¨ Try eating a few soda crackers.  ¨ Drink liquids between meals instead of during meals.  · If you have a hard time pooping (constipation):  ¨ Eat high-fiber foods like fresh vegetables, fruit, and whole grains.  ¨ Drink enough fluids to keep your pee (urine) clear or pale yellow.  Activity and Exercise  · Exercise only as told by your doctor. Stop exercising if you have cramps or pain in your lower belly (abdomen) or low back.  · Try to avoid standing for long periods of time. Move your legs often if you must  one place for a long time.  · Avoid heavy lifting.  · Wear low-heeled shoes. Sit and stand up straight.  · You can have sex unless your doctor tells you not to.  Relief of Pain or Discomfort  · Wear a good support bra if your breasts are sore.  · Take warm water baths (sitz baths) to soothe pain or discomfort caused by hemorrhoids. Use hemorrhoid cream if your  doctor says it is okay.  · Rest with your legs raised if you have leg cramps or low back pain.  · Wear support hose if you have puffy, bulging veins (varicose veins) in your legs. Raise (elevate) your feet for 15 minutes, 3-4 times a day. Limit salt in your diet.  Prenatal Care  · Schedule your prenatal visits by the twelfth week of pregnancy.  · Write down your questions. Take them to your prenatal visits.  · Keep all your prenatal visits as told by your doctor.  Safety  · Wear your seat belt at all times when driving.  · Make a list of emergency phone numbers. The list should include numbers for family, friends, the hospital, and police and fire departments.  General Tips  · Ask your doctor for a referral to a local prenatal class. Begin classes no later than at the start of month 6 of your pregnancy.  · Ask for help if you need counseling or help with nutrition. Your doctor can give you advice or tell you where to go for help.  · Do not use hot tubs, steam rooms, or saunas.  · Do not douche or use tampons or scented sanitary pads.  · Do not cross your legs for long periods of time.  · Avoid litter boxes and soil used by cats.  · Avoid all smoking, herbs, and alcohol. Avoid drugs not approved by your doctor.  · Do not use any tobacco products, including cigarettes, chewing tobacco, and electronic cigarettes. If you need help quitting, ask your doctor. You may get counseling or other support to help you quit.  · Visit your dentist. At home, brush your teeth with a soft toothbrush. Be gentle when you floss.  GET HELP IF:  · You are dizzy.  · You have mild cramps or pressure in your lower belly.  · You have a nagging pain in your belly area.  · You continue to feel sick to your stomach, throw up, or have watery poop (diarrhea).  · You have a bad smelling fluid coming from your vagina.  · You have pain with peeing (urination).  · You have increased puffiness (swelling) in your face, hands, legs, or ankles.  GET HELP  RIGHT AWAY IF:   · You have a fever.  · You are leaking fluid from your vagina.  · You have spotting or bleeding from your vagina.  · You have very bad belly cramping or pain.  · You gain or lose weight rapidly.  · You throw up blood. It may look like coffee grounds.  · You are around people who have [place] measles, fifth disease, or chickenpox.  · You have a very bad headache.  · You have shortness of breath.  · You have any kind of trauma, such as from a fall or a car accident.     This information is not intended to replace advice given to you by your health care provider. Make sure you discuss any questions you have with your health care provider.

## 2019-02-22 NOTE — PROGRESS NOTES
Chief Complaint   Patient presents with   • Possible Pregnancy     Patient complains of nausea and vomiting.         HPI  , 6w5d presents to our office today for initial prenatal visit.  She reports she does experience some discomforts of pregnancy including:, nausea and fatigue.    She states she and her  just recently . He is in attendance today.  They both express they are  excited about the pregnancy.    She states she had a hx of PCOS - she has lost 60 pounds.    She is taking her PNV and need refills - she does not take any other medications.   She would like something for her n/v     Past Medical History:   Diagnosis Date   • PCOS (polycystic ovarian syndrome)    • UTI (urinary tract infection)         Current Outpatient Medications:   •  Prenatal Vit-Fe Fum-FA-Omega (PRENATAL MULTI +DHA PO), Take  by mouth., Disp: , Rfl:    No Known Allergies   History reviewed. No pertinent surgical history.    Social History     Socioeconomic History   • Marital status: Single     Spouse name: Not on file   • Number of children: Not on file   • Years of education: Not on file   • Highest education level: Not on file   Social Needs   • Financial resource strain: Not on file   • Food insecurity - worry: Not on file   • Food insecurity - inability: Not on file   • Transportation needs - medical: Not on file   • Transportation needs - non-medical: Not on file   Occupational History   • Not on file   Tobacco Use   • Smoking status: Never Smoker   • Smokeless tobacco: Never Used   Substance and Sexual Activity   • Alcohol use: Yes     Comment: socially    • Drug use: No   • Sexual activity: Yes     Partners: Male     Birth control/protection: None   Other Topics Concern   • Not on file   Social History Narrative   • Not on file      Family History   Adopted: Yes       The following portions of the patient's history were reviewed as note above and updated   current medications allergies, past family history,  past medical history, past social history and past surgical history.      ROS  Pertinent items are noted in HPI, all other systems reviewed and negative      Physical Exam  /60   Wt 101 kg (222 lb)   LMP 12/26/2018 (Approximate)   BMI 36.94 kg/m²        Psych: Altert and oriented to time, place and person  Mood and affect appropriate   General: well developed; well nourished  no acute distress  Head: normocephalic  Neck: The neck is supple and the trachea is midline  Musculoskeletal: Normal gait  Full range of motion  Lungs:  breathing is unlabored  Back: Negative CVAT  Abdomen: Soft, non-tender, no organomegaly  Lower Extremities: LE: Neg edema  Genitourinary: deferred today        MDM  Impression:  Problems/Risks: Normal Pregnancy  Hx UTI's    Tests done today: Routine NOB labs / U/A Culture / (GC/CT next visit) - option for CF screening   TVS rev'd    Topics discussed: Rout NOB education including nutrition, exercise, OTCmeds, genetic screening   Written info on 1st trimester of pregnancy   preventive measures of UTI  adequate rest and fluids  encouraged questions - call prn    Tests next visit: option for NIPS / ck insurance / cost

## 2019-02-23 LAB
ABO GROUP BLD: (no result)
BASOPHILS # BLD AUTO: 0 X10E3/UL (ref 0–0.2)
BASOPHILS NFR BLD AUTO: 0 %
BLD GP AB SCN SERPL QL: NEGATIVE
EOSINOPHIL # BLD AUTO: 0.2 X10E3/UL (ref 0–0.4)
EOSINOPHIL NFR BLD AUTO: 2 %
ERYTHROCYTE [DISTWIDTH] IN BLOOD BY AUTOMATED COUNT: 12.9 % (ref 12.3–15.4)
HBV SURFACE AG SERPL QL IA: NEGATIVE
HCT VFR BLD AUTO: 40.4 % (ref 34–46.6)
HCV AB S/CO SERPL IA: <0.1 S/CO RATIO (ref 0–0.9)
HGB BLD-MCNC: 13.8 G/DL (ref 11.1–15.9)
HIV 1+2 AB+HIV1 P24 AG SERPL QL IA: NON REACTIVE
IMM GRANULOCYTES # BLD AUTO: 0 X10E3/UL (ref 0–0.1)
IMM GRANULOCYTES NFR BLD AUTO: 0 %
LYMPHOCYTES # BLD AUTO: 3.3 X10E3/UL (ref 0.7–3.1)
LYMPHOCYTES NFR BLD AUTO: 31 %
MCH RBC QN AUTO: 29.3 PG (ref 26.6–33)
MCHC RBC AUTO-ENTMCNC: 34.2 G/DL (ref 31.5–35.7)
MCV RBC AUTO: 86 FL (ref 79–97)
MONOCYTES # BLD AUTO: 0.4 X10E3/UL (ref 0.1–0.9)
MONOCYTES NFR BLD AUTO: 4 %
NEUTROPHILS # BLD AUTO: 6.5 X10E3/UL (ref 1.4–7)
NEUTROPHILS NFR BLD AUTO: 63 %
PLATELET # BLD AUTO: 299 X10E3/UL (ref 150–379)
RBC # BLD AUTO: 4.71 X10E6/UL (ref 3.77–5.28)
RH BLD: POSITIVE
RPR SER QL: NON REACTIVE
RUBV IGG SERPL IA-ACNC: 1.11 INDEX
WBC # BLD AUTO: 10.4 X10E3/UL (ref 3.4–10.8)

## 2019-02-24 LAB
APPEARANCE UR: CLEAR
BACTERIA #/AREA URNS HPF: ABNORMAL /HPF
BACTERIA UR CULT: NORMAL
BACTERIA UR CULT: NORMAL
BILIRUB UR QL STRIP: NEGATIVE
COLOR UR: YELLOW
EPI CELLS #/AREA URNS HPF: ABNORMAL /HPF
GLUCOSE UR QL: NEGATIVE
HGB UR QL STRIP: NEGATIVE
KETONES UR QL STRIP: NEGATIVE
LEUKOCYTE ESTERASE UR QL STRIP: NEGATIVE
MUCOUS THREADS URNS QL MICRO: ABNORMAL /HPF
NITRITE UR QL STRIP: NEGATIVE
PH UR STRIP: 5.5 [PH] (ref 5–8)
PROT UR QL STRIP: (no result)
RBC #/AREA URNS HPF: ABNORMAL /HPF
SP GR UR: (no result) (ref 1–1.03)
URNS CMNT MICRO: ABNORMAL
UROBILINOGEN UR STRIP-MCNC: (no result) MG/DL
WBC #/AREA URNS HPF: ABNORMAL /HPF

## 2019-03-26 ENCOUNTER — ROUTINE PRENATAL (OUTPATIENT)
Dept: OBSTETRICS AND GYNECOLOGY | Facility: CLINIC | Age: 24
End: 2019-03-26

## 2019-03-26 VITALS — WEIGHT: 230 LBS | DIASTOLIC BLOOD PRESSURE: 70 MMHG | BODY MASS INDEX: 37.12 KG/M2 | SYSTOLIC BLOOD PRESSURE: 118 MMHG

## 2019-03-26 DIAGNOSIS — Z34.91 PRENATAL CARE IN FIRST TRIMESTER: Primary | ICD-10-CM

## 2019-03-26 DIAGNOSIS — R05.3 PERSISTENT COUGH: ICD-10-CM

## 2019-03-26 PROCEDURE — 99214 OFFICE O/P EST MOD 30 MIN: CPT | Performed by: OBSTETRICS & GYNECOLOGY

## 2019-03-26 RX ORDER — BENZONATATE 100 MG/1
100 CAPSULE ORAL 3 TIMES DAILY PRN
Qty: 60 CAPSULE | Refills: 5 | Status: SHIPPED | OUTPATIENT
Start: 2019-03-26 | End: 2019-04-09

## 2019-03-26 NOTE — PROGRESS NOTES
Chief Complaint   Patient presents with   • Routine Prenatal Visit     No complaints       HPI:   Nery is a  currently at 11w2d who today reports the following:  Contractions - No; Leaking - No; Vaginal bleeding -  No; Swelling of extremities - No. Good fetal movement - No.    + persistent cough for several weeks, no fevers, dry, no congestion    ROS:  GI: Nausea - No; Constipation - No; Diarrhea - No. RUQ pain - No    Neuro: Headache - No; Visual disturbances - No.    Pertinent items are noted in HPI, all other systems reviewed and negative    Review of History:  The following portions of the patient's history were reviewed and updated as appropriate:problem list, current medications, allergies, past family history, past medical history, past social history and past surgical history.    Current Outpatient Medications on File Prior to Visit   Medication Sig Dispense Refill   • Prenatal Vit-Fe Fum-FA-Omega (PRENATAL MULTI +DHA PO) Take  by mouth.     • promethazine (PHENERGAN) 12.5 MG tablet Take 1 tablet by mouth Every 6 (Six) Hours As Needed for Nausea or Vomiting. 30 tablet 0     No current facility-administered medications on file prior to visit.        EXAM:  /70   Wt 104 kg (230 lb)   LMP 2018 (Approximate)   BMI 37.12 kg/m²     Gen: NAD, conversant  Pulm: No use of accessory muscles, normal respirations  Abdomen: Gravid, nontender, size = dates, + fetal cardiac activity  Ext: no edema, no rashes, WWP  Gait: normal for pregnancy  Psych: Mood, insight, judgement intact  SVE: Not performed     Lab Results   Component Value Date    ABO A 2019    RH Positive 2019    ABSCRN Negative 2019       Social History    Tobacco Use      Smoking status: Never Smoker      Smokeless tobacco: Never Used      I have reviewed the prenatal labs and previous ultrasounds today.    MDM:  Diagnosis: Supervision of low risk pregnancy   Persistent cough   Tests/Orders/Rx today: No orders of the  defined types were placed in this encounter.    Meds Ordered: Tessalon perles   Topics discussed: Prenatal care milestones   Cough   Tests next visit: none   Next visit: 4 week(s)     Kelvin Hinton MD  Obstetrics and Gynecology  [facility]

## 2019-04-09 ENCOUNTER — RESULTS ENCOUNTER (OUTPATIENT)
Dept: INTERNAL MEDICINE | Facility: CLINIC | Age: 24
End: 2019-04-09

## 2019-04-09 ENCOUNTER — OFFICE VISIT (OUTPATIENT)
Dept: INTERNAL MEDICINE | Facility: CLINIC | Age: 24
End: 2019-04-09

## 2019-04-09 VITALS
OXYGEN SATURATION: 99 % | DIASTOLIC BLOOD PRESSURE: 70 MMHG | RESPIRATION RATE: 18 BRPM | HEART RATE: 89 BPM | WEIGHT: 230 LBS | BODY MASS INDEX: 37.12 KG/M2 | TEMPERATURE: 98.2 F | SYSTOLIC BLOOD PRESSURE: 122 MMHG

## 2019-04-09 DIAGNOSIS — R39.9 URINARY SYMPTOM OR SIGN: ICD-10-CM

## 2019-04-09 DIAGNOSIS — R30.0 DYSURIA: ICD-10-CM

## 2019-04-09 DIAGNOSIS — J45.30 MILD PERSISTENT ASTHMA WITHOUT COMPLICATION: ICD-10-CM

## 2019-04-09 DIAGNOSIS — K21.9 GASTROESOPHAGEAL REFLUX DISEASE, ESOPHAGITIS PRESENCE NOT SPECIFIED: Primary | ICD-10-CM

## 2019-04-09 LAB
BILIRUB BLD-MCNC: NEGATIVE MG/DL
CLARITY, POC: ABNORMAL
COLOR UR: YELLOW
GLUCOSE UR STRIP-MCNC: NEGATIVE MG/DL
KETONES UR QL: NEGATIVE
LEUKOCYTE EST, POC: ABNORMAL
NITRITE UR-MCNC: NEGATIVE MG/ML
PH UR: 7 [PH] (ref 5–8)
PROT UR STRIP-MCNC: ABNORMAL MG/DL
RBC # UR STRIP: NEGATIVE /UL
SP GR UR: 1.01 (ref 1–1.03)
UROBILINOGEN UR QL: ABNORMAL

## 2019-04-09 PROCEDURE — 99214 OFFICE O/P EST MOD 30 MIN: CPT | Performed by: PHYSICIAN ASSISTANT

## 2019-04-09 RX ORDER — RANITIDINE 150 MG/1
150 TABLET ORAL DAILY PRN
Qty: 30 TABLET | Refills: 2 | Status: SHIPPED | OUTPATIENT
Start: 2019-04-09 | End: 2019-04-09

## 2019-04-09 RX ORDER — RANITIDINE 15 MG/ML
150 SYRUP ORAL DAILY
Qty: 300 ML | Refills: 2 | Status: SHIPPED | OUTPATIENT
Start: 2019-04-09 | End: 2019-08-27 | Stop reason: SDUPTHER

## 2019-04-09 NOTE — PROGRESS NOTES
Subjective     Chief Complaint   Patient presents with   • Cough     heartburn,uti       History of Present Illness     Nery Ibarra is a 23 y.o. female presenting with complaints of heartburn. She is currently taking tums. Symptoms worsened by most foods and drinks. She is currently 13 weeks pregnant.    She is also complaining of intermittent dysuria and vaginal itching. She was seen by an urgent care earlier in the month and diagnosed with vaginal yeast infection. Discharge has mostly improved. She denies any fevers, chills, flank pain, hematuria. Denies any odor.    She is also complaining of worsened asthma due to seasonal allergies. She is using antihistamine as needed. Complaining of cough, shortness of breath and wheezing. Has needed inhalers in the past but all were stopped due to pregnancy. Symptoms are worse at nighttime.      The following portions of the patient's history were reviewed and updated as appropriate: current medications, allergies, PMH.    Review of Systems   Constitutional: Negative for appetite change, chills, fatigue, fever and unexpected weight change.   HENT: Negative for congestion, ear pain, hearing loss, nosebleeds, sinus pressure, sore throat, tinnitus and trouble swallowing.    Eyes: Negative for pain, discharge, redness, itching and visual disturbance.   Respiratory: Positive for cough, shortness of breath and wheezing. Negative for chest tightness.    Cardiovascular: Negative for chest pain, palpitations and leg swelling.   Gastrointestinal: Negative for abdominal pain, blood in stool, constipation, diarrhea, nausea and vomiting.        Reflux.   Endocrine: Negative for cold intolerance, heat intolerance, polydipsia, polyphagia and polyuria.   Genitourinary: Positive for dysuria and vaginal discharge. Negative for decreased urine volume, flank pain, frequency and hematuria.   Musculoskeletal: Negative for arthralgias, back pain, gait problem, joint swelling, myalgias, neck  pain and neck stiffness.   Skin: Negative for color change and rash.   Allergic/Immunologic: Negative for environmental allergies, food allergies and immunocompromised state.   Neurological: Negative for dizziness, syncope, weakness, light-headedness and headaches.   Hematological: Negative for adenopathy. Does not bruise/bleed easily.   Psychiatric/Behavioral: Negative for dysphoric mood, sleep disturbance and suicidal ideas. The patient is not nervous/anxious.        Objective     Vitals:    04/09/19 1454   BP: 122/70   Pulse: 89   Resp: 18   Temp: 98.2 °F (36.8 °C)   TempSrc: Temporal   SpO2: 99%   Weight: 104 kg (230 lb)       Physical Exam   Constitutional: Appears well-developed and well-nourished.   HENT:   Right Ear: Tympanic membrane and external ear normal.   Left Ear: Tympanic membrane and external ear normal.   Nose: Nose normal.   Mouth/Throat: Oropharynx is clear and moist.   Eyes: EOM are normal. Pupils are equal, round, and reactive to light.   Neck: Normal range of motion. Neck supple.   Cardiovascular: Normal rate, regular rhythm and normal heart sounds.    Pulmonary/Chest: Effort normal and breath sounds normal.   Abdominal: Soft. Bowel sounds are normal. There is no CVA tenderness.   Musculoskeletal: Normal range of motion.   Lymphadenopathy: No cervical adenopathy noted.   Neurological: Alert and oriented to person, place, and time.   Skin: Skin is warm and dry.   Psychiatric: Exhibits a normal mood and affect.     Assessment/Plan     Diagnoses and all orders for this visit:    Gastroesophageal reflux disease, esophagitis presence not specified  -     ranitidine (ZANTAC) 150 MG/10ML syrup; Take 10 mL by mouth Daily.    Patient requested liquid medication. Avoid food and drink that exacerbate symptoms, lying down after large meals.     Mild persistent asthma without complication        -     budesonide (PULMICORT FLEXHALER) 90 MCG/ACT inhaler; Inhale 2 puffs 2 (Two) Times a Day.    Add on low  dose ICS.    Dysuria  -     POCT urinalysis dipstick, automated  -     Urine Culture - Urine, Urine, Clean Catch; Future  -     NuSwab VG+ - Swab, Vagina    Continue following with OB.    Mary Tay PA-C  04/09/2019         Please note that portions of this note were completed with a voice recognition program. Efforts were made to edit dictation, but occasionally words are mistranscribed.

## 2019-04-10 ENCOUNTER — TELEPHONE (OUTPATIENT)
Dept: INTERNAL MEDICINE | Facility: CLINIC | Age: 24
End: 2019-04-10

## 2019-04-10 NOTE — TELEPHONE ENCOUNTER
Spoke with pharmacy and he said that it did not need a PA that it was saying to use a generic drug in that class of medication?

## 2019-04-10 NOTE — TELEPHONE ENCOUNTER
Can we see if it needs a PA or if there are recommended alternatives? We are limited as she is pregnant.

## 2019-04-10 NOTE — TELEPHONE ENCOUNTER
Patient called in stating that she seen Mary on 4/9/19 she states that she called in an inhaler, and it isn't covered by her insurance. She would like to see if she could have another inhaler called in.

## 2019-04-11 ENCOUNTER — PRIOR AUTHORIZATION (OUTPATIENT)
Dept: INTERNAL MEDICINE | Facility: CLINIC | Age: 24
End: 2019-04-11

## 2019-04-11 LAB
BACTERIA UR CULT: NORMAL
BACTERIA UR CULT: NORMAL

## 2019-04-12 LAB
A VAGINAE DNA VAG QL NAA+PROBE: NORMAL SCORE
BVAB2 DNA VAG QL NAA+PROBE: NORMAL SCORE
C ALBICANS DNA VAG QL NAA+PROBE: NEGATIVE
C GLABRATA DNA VAG QL NAA+PROBE: NEGATIVE
C TRACH RRNA SPEC QL NAA+PROBE: NEGATIVE
MEGA1 DNA VAG QL NAA+PROBE: NORMAL SCORE
N GONORRHOEA RRNA SPEC QL NAA+PROBE: NEGATIVE
T VAGINALIS RRNA SPEC QL NAA+PROBE: NEGATIVE

## 2019-04-15 NOTE — TELEPHONE ENCOUNTER
Patient returned someone's call from today.  I couldn't find a note where anyone had called and told her if if was about a preauthoization that she would be contacted tomorrow as that person has gone for the day.

## 2019-04-16 ENCOUNTER — ROUTINE PRENATAL (OUTPATIENT)
Dept: OBSTETRICS AND GYNECOLOGY | Facility: CLINIC | Age: 24
End: 2019-04-16

## 2019-04-16 VITALS — DIASTOLIC BLOOD PRESSURE: 72 MMHG | BODY MASS INDEX: 37.45 KG/M2 | SYSTOLIC BLOOD PRESSURE: 120 MMHG | WEIGHT: 232 LBS

## 2019-04-16 DIAGNOSIS — Z34.92 PRENATAL CARE IN SECOND TRIMESTER: Primary | ICD-10-CM

## 2019-04-16 PROCEDURE — 99213 OFFICE O/P EST LOW 20 MIN: CPT | Performed by: OBSTETRICS & GYNECOLOGY

## 2019-04-16 RX ORDER — BECLOMETHASONE DIPROPIONATE HFA 40 UG/1
AEROSOL, METERED RESPIRATORY (INHALATION)
COMMUNITY
Start: 2019-04-11 | End: 2020-01-29

## 2019-04-17 NOTE — PROGRESS NOTES
Chief Complaint   Patient presents with   • Pregnancy Problem     C/O irritation, lower back pain and pressure after urination.        HPI:   Nery is a  currently at 14w2d who today reports the following:  Contractions - No; Leaking - No; Vaginal bleeding -  No; Swelling of extremities - No. Good fetal movement - No.    ROS:  GI: Nausea - No; Constipation - No; Diarrhea - No. RUQ pain - No    Neuro: Headache - No; Visual disturbances - No.    Pertinent items are noted in HPI, all other systems reviewed and negative    Review of History:  The following portions of the patient's history were reviewed and updated as appropriate:problem list, current medications, allergies, past family history, past medical history, past social history and past surgical history.    Current Outpatient Medications on File Prior to Visit   Medication Sig Dispense Refill   • beclomethasone (QVAR) 40 MCG/ACT inhaler Inhale 2 puffs 2 (Two) Times a Day. 1 inhaler 5   • Prenatal Vit-Fe Fum-FA-Omega (PRENATAL MULTI +DHA PO) Take  by mouth.     • promethazine (PHENERGAN) 12.5 MG tablet Take 1 tablet by mouth Every 6 (Six) Hours As Needed for Nausea or Vomiting. 30 tablet 0   • QVAR REDIHALER 40 MCG/ACT inhaler      • ranitidine (ZANTAC) 150 MG/10ML syrup Take 10 mL by mouth Daily. 300 mL 2     No current facility-administered medications on file prior to visit.        EXAM:  /72   Wt 105 kg (232 lb)   LMP 2018 (Approximate)   BMI 37.45 kg/m²     Gen: NAD, conversant  Pulm: No use of accessory muscles, normal respirations  Abdomen: Gravid, nontender, size = dates, + fetal cardiac activity  Ext: no edema, no rashes, WWP  Gait: normal for pregnancy  Psych: Mood, insight, judgement intact  SVE: Not performed     Lab Results   Component Value Date    ABO A 2019    RH Positive 2019    ABSCRN Negative 2019       Social History    Tobacco Use      Smoking status: Never Smoker      Smokeless tobacco: Never  Used      I have reviewed the prenatal labs and previous ultrasounds today.    MDM:  Diagnosis: Supervision of low risk pregnancy    Tests/Orders/Rx today: No orders of the defined types were placed in this encounter.    Meds Ordered: none   Topics discussed: Prenatal care milestones    Tests next visit: U/S for anatomic screening   Next visit: 4 week(s)     Kelvin Hinton MD  Obstetrics and Gynecology  [facility]

## 2019-05-19 ENCOUNTER — HOSPITAL ENCOUNTER (EMERGENCY)
Facility: HOSPITAL | Age: 24
Discharge: HOME | End: 2019-05-19
Attending: EMERGENCY MEDICINE | Admitting: EMERGENCY MEDICINE

## 2019-05-19 ENCOUNTER — APPOINTMENT (OUTPATIENT)
Dept: ULTRASOUND IMAGING | Facility: HOSPITAL | Age: 24
End: 2019-05-19

## 2019-05-19 VITALS
HEIGHT: 64 IN | BODY MASS INDEX: 39.64 KG/M2 | WEIGHT: 232.2 LBS | TEMPERATURE: 98.3 F | DIASTOLIC BLOOD PRESSURE: 78 MMHG | OXYGEN SATURATION: 99 % | RESPIRATION RATE: 18 BRPM | HEART RATE: 110 BPM | SYSTOLIC BLOOD PRESSURE: 116 MMHG

## 2019-05-19 DIAGNOSIS — N39.0 ACUTE LOWER UTI (URINARY TRACT INFECTION): ICD-10-CM

## 2019-05-19 DIAGNOSIS — R11.2 NAUSEA, VOMITING AND DIARRHEA: Primary | ICD-10-CM

## 2019-05-19 DIAGNOSIS — R19.7 NAUSEA, VOMITING AND DIARRHEA: Primary | ICD-10-CM

## 2019-05-19 LAB
ALBUMIN SERPL-MCNC: 4.2 G/DL (ref 3.5–5)
ALBUMIN/GLOB SERPL: 1.4 G/DL (ref 1–2)
ALP SERPL-CCNC: 67 U/L (ref 38–126)
ALT SERPL W P-5'-P-CCNC: 27 U/L (ref 13–69)
ANION GAP SERPL CALCULATED.3IONS-SCNC: 15.6 MMOL/L (ref 10–20)
AST SERPL-CCNC: 26 U/L (ref 15–46)
BACTERIA UR QL AUTO: ABNORMAL /HPF
BASOPHILS # BLD AUTO: 0.03 10*3/MM3 (ref 0–0.2)
BASOPHILS NFR BLD AUTO: 0.2 % (ref 0–1.5)
BILIRUB SERPL-MCNC: 0.4 MG/DL (ref 0.2–1.3)
BILIRUB UR QL STRIP: NEGATIVE
BUN BLD-MCNC: 7 MG/DL (ref 7–20)
BUN/CREAT SERPL: 17.5 (ref 7.1–23.5)
CALCIUM SPEC-SCNC: 9.3 MG/DL (ref 8.4–10.2)
CHLORIDE SERPL-SCNC: 106 MMOL/L (ref 98–107)
CLARITY UR: ABNORMAL
CO2 SERPL-SCNC: 22 MMOL/L (ref 26–30)
COLOR UR: YELLOW
CREAT BLD-MCNC: 0.4 MG/DL (ref 0.6–1.3)
DEPRECATED RDW RBC AUTO: 38.6 FL (ref 37–54)
EOSINOPHIL # BLD AUTO: 0.1 10*3/MM3 (ref 0–0.4)
EOSINOPHIL NFR BLD AUTO: 0.6 % (ref 0.3–6.2)
ERYTHROCYTE [DISTWIDTH] IN BLOOD BY AUTOMATED COUNT: 12.5 % (ref 12.3–15.4)
GFR SERPL CREATININE-BSD FRML MDRD: >150 ML/MIN/1.73
GLOBULIN UR ELPH-MCNC: 3.1 GM/DL
GLUCOSE BLD-MCNC: 116 MG/DL (ref 74–98)
GLUCOSE UR STRIP-MCNC: NEGATIVE MG/DL
HCT VFR BLD AUTO: 37.3 % (ref 34–46.6)
HGB BLD-MCNC: 13.6 G/DL (ref 12–15.9)
HGB UR QL STRIP.AUTO: NEGATIVE
HYALINE CASTS UR QL AUTO: ABNORMAL /LPF
IMM GRANULOCYTES # BLD AUTO: 0.06 10*3/MM3 (ref 0–0.05)
IMM GRANULOCYTES NFR BLD AUTO: 0.4 % (ref 0–0.5)
KETONES UR QL STRIP: NEGATIVE
LEUKOCYTE ESTERASE UR QL STRIP.AUTO: ABNORMAL
LIPASE SERPL-CCNC: 63 U/L (ref 23–300)
LYMPHOCYTES # BLD AUTO: 0.91 10*3/MM3 (ref 0.7–3.1)
LYMPHOCYTES NFR BLD AUTO: 5.4 % (ref 19.6–45.3)
MCH RBC QN AUTO: 31.2 PG (ref 26.6–33)
MCHC RBC AUTO-ENTMCNC: 36.5 G/DL (ref 31.5–35.7)
MCV RBC AUTO: 85.6 FL (ref 79–97)
MONOCYTES # BLD AUTO: 0.65 10*3/MM3 (ref 0.1–0.9)
MONOCYTES NFR BLD AUTO: 3.8 % (ref 5–12)
NEUTROPHILS # BLD AUTO: 15.14 10*3/MM3 (ref 1.7–7)
NEUTROPHILS NFR BLD AUTO: 89.6 % (ref 42.7–76)
NITRITE UR QL STRIP: NEGATIVE
NRBC BLD AUTO-RTO: 0 /100 WBC (ref 0–0.2)
PH UR STRIP.AUTO: 8 [PH] (ref 5–8)
PLATELET # BLD AUTO: 259 10*3/MM3 (ref 140–450)
PMV BLD AUTO: 10.2 FL (ref 6–12)
POTASSIUM BLD-SCNC: 3.6 MMOL/L (ref 3.5–5.1)
PROT SERPL-MCNC: 7.3 G/DL (ref 6.3–8.2)
PROT UR QL STRIP: ABNORMAL
RBC # BLD AUTO: 4.36 10*6/MM3 (ref 3.77–5.28)
RBC # UR: ABNORMAL /HPF
REF LAB TEST METHOD: ABNORMAL
SODIUM BLD-SCNC: 140 MMOL/L (ref 137–145)
SP GR UR STRIP: 1.02 (ref 1–1.03)
SQUAMOUS #/AREA URNS HPF: ABNORMAL /HPF
UROBILINOGEN UR QL STRIP: ABNORMAL
WBC NRBC COR # BLD: 16.89 10*3/MM3 (ref 3.4–10.8)
WBC UR QL AUTO: ABNORMAL /HPF

## 2019-05-19 PROCEDURE — 25010000002 PROMETHAZINE PER 50 MG: Performed by: PHYSICIAN ASSISTANT

## 2019-05-19 PROCEDURE — 96365 THER/PROPH/DIAG IV INF INIT: CPT

## 2019-05-19 PROCEDURE — 99284 EMERGENCY DEPT VISIT MOD MDM: CPT

## 2019-05-19 PROCEDURE — 85025 COMPLETE CBC W/AUTO DIFF WBC: CPT | Performed by: PHYSICIAN ASSISTANT

## 2019-05-19 PROCEDURE — 76805 OB US >/= 14 WKS SNGL FETUS: CPT

## 2019-05-19 PROCEDURE — 25010000002 ONDANSETRON PER 1 MG: Performed by: PHYSICIAN ASSISTANT

## 2019-05-19 PROCEDURE — 99283 EMERGENCY DEPT VISIT LOW MDM: CPT

## 2019-05-19 PROCEDURE — 87086 URINE CULTURE/COLONY COUNT: CPT | Performed by: PHYSICIAN ASSISTANT

## 2019-05-19 PROCEDURE — 96375 TX/PRO/DX INJ NEW DRUG ADDON: CPT

## 2019-05-19 PROCEDURE — 96376 TX/PRO/DX INJ SAME DRUG ADON: CPT

## 2019-05-19 PROCEDURE — 83690 ASSAY OF LIPASE: CPT | Performed by: PHYSICIAN ASSISTANT

## 2019-05-19 PROCEDURE — 81001 URINALYSIS AUTO W/SCOPE: CPT | Performed by: PHYSICIAN ASSISTANT

## 2019-05-19 PROCEDURE — 25010000002 DIPHENHYDRAMINE PER 50 MG: Performed by: PHYSICIAN ASSISTANT

## 2019-05-19 PROCEDURE — 80053 COMPREHEN METABOLIC PANEL: CPT | Performed by: PHYSICIAN ASSISTANT

## 2019-05-19 PROCEDURE — 25010000002 METOCLOPRAMIDE PER 10 MG: Performed by: PHYSICIAN ASSISTANT

## 2019-05-19 PROCEDURE — 25010000002 CEFTRIAXONE SODIUM-DEXTROSE 1-3.74 GM-%(50ML) RECONSTITUTED SOLUTION: Performed by: PHYSICIAN ASSISTANT

## 2019-05-19 RX ORDER — CEFUROXIME AXETIL 500 MG/1
500 TABLET ORAL 2 TIMES DAILY
Qty: 10 TABLET | Refills: 0 | Status: SHIPPED | OUTPATIENT
Start: 2019-05-19 | End: 2020-01-29

## 2019-05-19 RX ORDER — ONDANSETRON 2 MG/ML
4 INJECTION INTRAMUSCULAR; INTRAVENOUS ONCE
Status: COMPLETED | OUTPATIENT
Start: 2019-05-19 | End: 2019-05-19

## 2019-05-19 RX ORDER — CEFTRIAXONE 1 G/50ML
1 INJECTION, SOLUTION INTRAVENOUS ONCE
Status: COMPLETED | OUTPATIENT
Start: 2019-05-19 | End: 2019-05-19

## 2019-05-19 RX ORDER — SODIUM CHLORIDE 0.9 % (FLUSH) 0.9 %
10 SYRINGE (ML) INJECTION AS NEEDED
Status: DISCONTINUED | OUTPATIENT
Start: 2019-05-19 | End: 2019-05-19 | Stop reason: HOSPADM

## 2019-05-19 RX ORDER — DIPHENHYDRAMINE HYDROCHLORIDE 50 MG/ML
12.5 INJECTION INTRAMUSCULAR; INTRAVENOUS ONCE
Status: DISCONTINUED | OUTPATIENT
Start: 2019-05-19 | End: 2019-05-19

## 2019-05-19 RX ORDER — ACETAMINOPHEN 160 MG/5ML
325 SOLUTION ORAL ONCE
Status: COMPLETED | OUTPATIENT
Start: 2019-05-19 | End: 2019-05-19

## 2019-05-19 RX ORDER — PROMETHAZINE HYDROCHLORIDE 12.5 MG/1
12.5 TABLET ORAL EVERY 6 HOURS PRN
Qty: 15 TABLET | Refills: 0 | Status: SHIPPED | OUTPATIENT
Start: 2019-05-19 | End: 2020-01-29

## 2019-05-19 RX ORDER — ONDANSETRON 4 MG/1
4 TABLET, ORALLY DISINTEGRATING ORAL EVERY 8 HOURS PRN
Qty: 8 TABLET | Refills: 0 | Status: SHIPPED | OUTPATIENT
Start: 2019-05-19 | End: 2020-01-29

## 2019-05-19 RX ORDER — PROMETHAZINE HYDROCHLORIDE 25 MG/ML
6.25 INJECTION, SOLUTION INTRAMUSCULAR; INTRAVENOUS ONCE
Status: COMPLETED | OUTPATIENT
Start: 2019-05-19 | End: 2019-05-19

## 2019-05-19 RX ORDER — ACETAMINOPHEN 500 MG
500 TABLET ORAL ONCE
Status: DISCONTINUED | OUTPATIENT
Start: 2019-05-19 | End: 2019-05-19

## 2019-05-19 RX ORDER — DIPHENHYDRAMINE HYDROCHLORIDE 50 MG/ML
25 INJECTION INTRAMUSCULAR; INTRAVENOUS ONCE
Status: COMPLETED | OUTPATIENT
Start: 2019-05-19 | End: 2019-05-19

## 2019-05-19 RX ORDER — METOCLOPRAMIDE HYDROCHLORIDE 5 MG/ML
10 INJECTION INTRAMUSCULAR; INTRAVENOUS ONCE
Status: COMPLETED | OUTPATIENT
Start: 2019-05-19 | End: 2019-05-19

## 2019-05-19 RX ADMIN — DIPHENHYDRAMINE HYDROCHLORIDE 25 MG: 50 INJECTION, SOLUTION INTRAMUSCULAR; INTRAVENOUS at 13:54

## 2019-05-19 RX ADMIN — SODIUM CHLORIDE 1000 ML: 9 INJECTION, SOLUTION INTRAVENOUS at 11:50

## 2019-05-19 RX ADMIN — ACETAMINOPHEN 325 MG: 160 SOLUTION ORAL at 11:55

## 2019-05-19 RX ADMIN — ONDANSETRON 4 MG: 2 INJECTION INTRAMUSCULAR; INTRAVENOUS at 12:28

## 2019-05-19 RX ADMIN — ONDANSETRON 4 MG: 2 INJECTION INTRAMUSCULAR; INTRAVENOUS at 09:23

## 2019-05-19 RX ADMIN — METOCLOPRAMIDE 10 MG: 5 INJECTION, SOLUTION INTRAMUSCULAR; INTRAVENOUS at 13:54

## 2019-05-19 RX ADMIN — SODIUM CHLORIDE 1000 ML: 9 INJECTION, SOLUTION INTRAVENOUS at 09:23

## 2019-05-19 RX ADMIN — CEFTRIAXONE 1 G: 1 INJECTION, SOLUTION INTRAVENOUS at 11:53

## 2019-05-19 RX ADMIN — PROMETHAZINE HYDROCHLORIDE 6.25 MG: 25 INJECTION INTRAMUSCULAR; INTRAVENOUS at 10:41

## 2019-05-20 LAB — BACTERIA SPEC AEROBE CULT: NORMAL

## 2019-06-08 ENCOUNTER — HOSPITAL ENCOUNTER (OUTPATIENT)
Facility: HOSPITAL | Age: 24
Discharge: HOME | End: 2019-06-08
Attending: MIDWIFE | Admitting: MIDWIFE

## 2019-06-08 VITALS
TEMPERATURE: 98.9 F | DIASTOLIC BLOOD PRESSURE: 60 MMHG | OXYGEN SATURATION: 100 % | RESPIRATION RATE: 18 BRPM | SYSTOLIC BLOOD PRESSURE: 114 MMHG | HEIGHT: 64 IN | HEART RATE: 98 BPM | BODY MASS INDEX: 39.61 KG/M2 | WEIGHT: 232 LBS

## 2019-06-08 LAB
AMORPH URATE CRY URNS QL MICRO: ABNORMAL /HPF
BACTERIA UR QL AUTO: ABNORMAL /HPF
BILIRUB BLD-MCNC: NEGATIVE MG/DL
BILIRUB UR QL STRIP: NEGATIVE
CLARITY UR: ABNORMAL
CLARITY, POC: ABNORMAL
COLOR UR: YELLOW
COLOR UR: YELLOW
GLUCOSE UR STRIP-MCNC: NEGATIVE MG/DL
GLUCOSE UR STRIP-MCNC: NEGATIVE MG/DL
HGB UR QL STRIP.AUTO: ABNORMAL
HYALINE CASTS UR QL AUTO: ABNORMAL /LPF
KETONES UR QL STRIP: NEGATIVE
KETONES UR QL: NEGATIVE
LEUKOCYTE EST, POC: ABNORMAL
LEUKOCYTE ESTERASE UR QL STRIP.AUTO: ABNORMAL
NITRITE UR QL STRIP: NEGATIVE
NITRITE UR-MCNC: NEGATIVE MG/ML
PH UR STRIP.AUTO: 7.5 [PH] (ref 5–8)
PH UR: 7.5 [PH] (ref 5–8)
PROT UR QL STRIP: NEGATIVE
PROT UR STRIP-MCNC: ABNORMAL MG/DL
RBC # UR STRIP: ABNORMAL /UL
RBC # UR: ABNORMAL /HPF
REF LAB TEST METHOD: ABNORMAL
SP GR UR STRIP: 1.01 (ref 1–1.03)
SP GR UR: 1 (ref 1–1.03)
SQUAMOUS #/AREA URNS HPF: ABNORMAL /HPF
UROBILINOGEN UR QL STRIP: ABNORMAL
UROBILINOGEN UR QL: NORMAL
WBC UR QL AUTO: ABNORMAL /HPF

## 2019-06-08 PROCEDURE — 81001 URINALYSIS AUTO W/SCOPE: CPT | Performed by: MIDWIFE

## 2019-06-08 PROCEDURE — 81002 URINALYSIS NONAUTO W/O SCOPE: CPT | Performed by: MIDWIFE

## 2019-06-08 PROCEDURE — G0463 HOSPITAL OUTPT CLINIC VISIT: HCPCS

## 2019-06-08 NOTE — NON STRESS TEST
Triage Note - Nursing Documentation  Labor and Delivery Admission Log    Nery Ibarra  : 1995  MRN: [id number]  CSN: [id number]    Date in / Time in:  2019  Time in:   Date out / Time out:    Time out:   Nurse: Deirdre Farooq, RN    Patient Info: She is a 23 y.o. year old  at 21w6d with an SHANNON of 10/13/2019, by Ultrasound who was seen on the [facility].    Chief Complaint:   Chief Complaint   Patient presents with   • Back Pain     C/O back pain and lower abd cramping, aching.        Provider Instructions / Disposition: see provider notification note. Pt. DC'd home with instructions to force water.    Patient Active Problem List   Diagnosis   • PCOS (polycystic ovarian syndrome)   • History of trichomonal vaginitis       NST Documentation (Only applicable > 32 weeks):

## 2019-07-03 ENCOUNTER — HOSPITAL ENCOUNTER (OUTPATIENT)
Facility: HOSPITAL | Age: 24
Discharge: HOME | End: 2019-07-03
Attending: MIDWIFE | Admitting: MIDWIFE

## 2019-07-03 VITALS — DIASTOLIC BLOOD PRESSURE: 66 MMHG | SYSTOLIC BLOOD PRESSURE: 128 MMHG | OXYGEN SATURATION: 99 % | HEART RATE: 100 BPM

## 2019-07-03 LAB
BILIRUB BLD-MCNC: NEGATIVE MG/DL
CLARITY, POC: CLEAR
COLOR UR: YELLOW
GLUCOSE UR STRIP-MCNC: NEGATIVE MG/DL
KETONES UR QL: NEGATIVE
LEUKOCYTE EST, POC: NEGATIVE
NITRITE UR-MCNC: NEGATIVE MG/ML
PH UR: 6.5 [PH] (ref 5–8)
PROT UR STRIP-MCNC: NEGATIVE MG/DL
RBC # UR STRIP: NEGATIVE /UL
SP GR UR: 1.01 (ref 1–1.03)
UROBILINOGEN UR QL: NORMAL

## 2019-07-03 PROCEDURE — G0463 HOSPITAL OUTPT CLINIC VISIT: HCPCS

## 2019-07-03 PROCEDURE — 81002 URINALYSIS NONAUTO W/O SCOPE: CPT | Performed by: MIDWIFE

## 2019-07-04 NOTE — NON STRESS TEST
"Triage Note - Nursing Documentation  Labor and Delivery Admission Log    Nery Ibarra  : 1995  MRN: [id number]  CSN: [id number]    Date in / Time in:  7/3/2019  Time in:  Date out / Time out:    Time out:   Nurse: Deirdre Farooq, RN    Patient Info: She is a 23 y.o. year old  at 25w3d with an SHANNON of 10/13/2019, by Ultrasound who was seen on the [facility].    Chief Complaint:   Chief Complaint   Patient presents with   • Decreased Fetal Movement     \"I havent felt my baby move as much today\".       Provider Instructions / Disposition:pt come in with decreased fetal movement. pt reports feeling baby kicking as soon as the monitors  Were put on and reports the baby has been moving nonstop since. Per Marjan GOMEZ pt may be discharged home and is to follow up with Dr. Doherty.     Patient Active Problem List   Diagnosis   • PCOS (polycystic ovarian syndrome)   • History of trichomonal vaginitis       NST Documentation (Only applicable > 32 weeks):      "

## 2019-08-27 DIAGNOSIS — K21.9 GASTROESOPHAGEAL REFLUX DISEASE, ESOPHAGITIS PRESENCE NOT SPECIFIED: ICD-10-CM

## 2019-08-27 RX ORDER — RANITIDINE 15 MG/ML
150 SYRUP ORAL DAILY
Qty: 300 ML | Refills: 1 | Status: SHIPPED | OUTPATIENT
Start: 2019-08-27 | End: 2020-01-29

## 2019-08-30 ENCOUNTER — TELEPHONE (OUTPATIENT)
Dept: INTERNAL MEDICINE | Facility: CLINIC | Age: 24
End: 2019-08-30

## 2019-08-30 NOTE — TELEPHONE ENCOUNTER
Patient called stating that she has a yeast infection and it is painful. She wanted to schedule, but there are no openings until Tuesday. Is there anything that can be called in, or she can do, while pregnant?

## 2020-01-29 ENCOUNTER — OFFICE VISIT (OUTPATIENT)
Dept: FAMILY MEDICINE CLINIC | Facility: CLINIC | Age: 25
End: 2020-01-29

## 2020-01-29 VITALS
BODY MASS INDEX: 38.44 KG/M2 | TEMPERATURE: 97.9 F | DIASTOLIC BLOOD PRESSURE: 80 MMHG | HEART RATE: 80 BPM | HEIGHT: 64 IN | OXYGEN SATURATION: 94 % | WEIGHT: 225.13 LBS | SYSTOLIC BLOOD PRESSURE: 125 MMHG

## 2020-01-29 DIAGNOSIS — Z87.19 HISTORY OF GALLSTONES: ICD-10-CM

## 2020-01-29 DIAGNOSIS — R10.13 EPIGASTRIC PAIN: ICD-10-CM

## 2020-01-29 DIAGNOSIS — R11.2 NON-INTRACTABLE VOMITING WITH NAUSEA, UNSPECIFIED VOMITING TYPE: ICD-10-CM

## 2020-01-29 PROCEDURE — 99214 OFFICE O/P EST MOD 30 MIN: CPT | Performed by: NURSE PRACTITIONER

## 2020-01-30 DIAGNOSIS — Z87.19 HISTORY OF GALLSTONES: ICD-10-CM

## 2020-01-30 DIAGNOSIS — R10.13 EPIGASTRIC PAIN: Primary | ICD-10-CM

## 2020-01-30 DIAGNOSIS — R11.2 NON-INTRACTABLE VOMITING WITH NAUSEA, UNSPECIFIED VOMITING TYPE: ICD-10-CM

## 2020-02-07 ENCOUNTER — APPOINTMENT (OUTPATIENT)
Dept: ULTRASOUND IMAGING | Facility: HOSPITAL | Age: 25
End: 2020-02-07

## 2020-05-29 ENCOUNTER — TELEPHONE (OUTPATIENT)
Dept: OBSTETRICS AND GYNECOLOGY | Facility: CLINIC | Age: 25
End: 2020-05-29

## 2020-05-29 ENCOUNTER — LAB (OUTPATIENT)
Dept: LAB | Facility: HOSPITAL | Age: 25
End: 2020-05-29

## 2020-05-29 DIAGNOSIS — Z32.00 POSSIBLE PREGNANCY, NOT CONFIRMED: ICD-10-CM

## 2020-05-29 DIAGNOSIS — Z32.00 POSSIBLE PREGNANCY, NOT CONFIRMED: Primary | ICD-10-CM

## 2020-05-29 LAB — HCG INTACT+B SERPL-ACNC: <0.5 MIU/ML

## 2020-05-29 PROCEDURE — 84702 CHORIONIC GONADOTROPIN TEST: CPT

## 2020-05-29 PROCEDURE — 36415 COLL VENOUS BLD VENIPUNCTURE: CPT

## 2020-05-29 NOTE — TELEPHONE ENCOUNTER
----- Message from Octavia Pressley sent at 5/29/2020  1:09 PM EDT -----  Contact: Pt  Pt has a Nexplanon but she had a + preg test on 05-27.     She requested a blood test.

## 2020-10-19 ENCOUNTER — TELEPHONE (OUTPATIENT)
Dept: OBSTETRICS AND GYNECOLOGY | Facility: CLINIC | Age: 25
End: 2020-10-19

## 2020-10-19 NOTE — TELEPHONE ENCOUNTER
----- Message from Alda Funez sent at 10/19/2020  2:38 PM EDT -----  Regarding: BIRTH CONTROL  Patient states that she has a simple question concerning birth control and would like a return call.     Patient call back: [phone]

## 2021-02-09 ENCOUNTER — OFFICE VISIT (OUTPATIENT)
Dept: OBSTETRICS AND GYNECOLOGY | Facility: CLINIC | Age: 26
End: 2021-02-09

## 2021-02-09 VITALS
SYSTOLIC BLOOD PRESSURE: 118 MMHG | HEIGHT: 65 IN | DIASTOLIC BLOOD PRESSURE: 76 MMHG | WEIGHT: 251 LBS | BODY MASS INDEX: 41.82 KG/M2

## 2021-02-09 DIAGNOSIS — N94.6 DYSMENORRHEA: ICD-10-CM

## 2021-02-09 DIAGNOSIS — N73.0 ACUTE PID (PELVIC INFLAMMATORY DISEASE): Primary | ICD-10-CM

## 2021-02-09 DIAGNOSIS — R10.2 PELVIC PAIN: ICD-10-CM

## 2021-02-09 PROCEDURE — 96372 THER/PROPH/DIAG INJ SC/IM: CPT | Performed by: OBSTETRICS & GYNECOLOGY

## 2021-02-09 PROCEDURE — 99214 OFFICE O/P EST MOD 30 MIN: CPT | Performed by: OBSTETRICS & GYNECOLOGY

## 2021-02-09 RX ORDER — DOXYCYCLINE HYCLATE 100 MG/1
100 CAPSULE ORAL 2 TIMES DAILY
Qty: 28 CAPSULE | Refills: 0 | Status: SHIPPED | OUTPATIENT
Start: 2021-02-09 | End: 2021-02-24

## 2021-02-09 RX ORDER — METRONIDAZOLE 500 MG/1
500 TABLET ORAL 2 TIMES DAILY
Qty: 28 TABLET | Refills: 0 | Status: SHIPPED | OUTPATIENT
Start: 2021-02-09 | End: 2021-02-24

## 2021-02-12 LAB
A VAGINAE DNA VAG QL NAA+PROBE: ABNORMAL SCORE
BVAB2 DNA VAG QL NAA+PROBE: ABNORMAL SCORE
C ALBICANS DNA VAG QL NAA+PROBE: NEGATIVE
C GLABRATA DNA VAG QL NAA+PROBE: NEGATIVE
C TRACH DNA VAG QL NAA+PROBE: NEGATIVE
MEGA1 DNA VAG QL NAA+PROBE: ABNORMAL SCORE
N GONORRHOEA DNA VAG QL NAA+PROBE: NEGATIVE
T VAGINALIS DNA VAG QL NAA+PROBE: NEGATIVE

## 2021-02-13 PROBLEM — R10.2 PELVIC PAIN: Status: ACTIVE | Noted: 2021-02-13

## 2021-02-13 PROBLEM — N94.6 DYSMENORRHEA: Status: ACTIVE | Noted: 2021-02-13

## 2021-02-13 NOTE — PROGRESS NOTES
"GYN Office Visit    Subjective   Chief Complaint   Patient presents with   • Pelvic Pain     Pelvic pain, discuss if another birth control would help.     Nery Ibarra is a 25 y.o. year old  presenting to be seen for pelvic pain.    She reports several weeks of worsening pelvic pain.  Pain is located in the bilateral lower quadrants and low back.  No vaginal discharge or odor.  No new sexual partners.  She does have painful menses.  She has a Nexplanon in for roughly 1 year at this point.  Her day-to-day pain is not appreciably better with this device, but she does have less frequent and lighter menses which has reduced her pain.    Past Medical History:   Diagnosis Date   • Anxiety    • Asthma    • Depression    • Gallstones    • GERD (gastroesophageal reflux disease)    • PCOS (polycystic ovarian syndrome)    • Urogenital trichomoniasis     Treated last year   • UTI (urinary tract infection)        Past Surgical History:   Procedure Laterality Date   • DENTAL PROCEDURE      Age 4       Family History   Adopted: Yes   Problem Relation Age of Onset   • Heart murmur Other         Social History     Tobacco Use   • Smoking status: Never Smoker   • Smokeless tobacco: Never Used   Substance Use Topics   • Alcohol use: Not Currently     Frequency: Never   • Drug use: No       (Not in a hospital admission)      Patient has no known allergies.    No current outpatient medications on file prior to visit.     No current facility-administered medications on file prior to visit.        Social History    Tobacco Use      Smoking status: Never Smoker      Smokeless tobacco: Never Used         Objective   /76   Ht 165.1 cm (65\")   Wt 114 kg (251 lb)   BMI 41.77 kg/m²     Physical Exam:  General Appearance: alert, pleasant, appears stated age, interactive and cooperative  Breasts: Not performed.  Abdomen: no masses, no hepatomegaly, no splenomegaly, soft non-tender, no guarding and no rebound " tenderness  Pelvis:  Pelvic: Clinical staff was present for exam  External genitalia:  normal appearance of the external genitalia including Bartholin's and Warren's glands.  :  urethral meatus normal;  Vagina:  normal pink mucosa without prolapse or lesions.  Cervix:  normal appearance. cervical motion tenderness is present;  Uterus:  normal size, shape and consistency. tenderness to palpation is present;  Adnexa:  normal bimanual exam of the adnexa.  Rectal:  digital rectal exam not performed; anus visually normal appearing.         Medical Decision Making:    No data reviewed.    Assessment   Pelvic pain  Dysmenorrhea  Pelvic inflammatory disease     Plan    Orders Placed This Encounter   Procedures   • NuSwab VG+ - Swab, Cervix     Order Specific Question:   [facility] Has the patient fasted?     Answer:   No       Medication Management: Rocephin 250 mg IM x1, Doxycycline and Flagyl p.o. x2 weeks    Procedures Performed: None    We reviewed her symptoms and exam findings in detail today.  She has cervical motion tenderness and uterine tenderness on exam.  Start empiric treatment for pelvic inflammatory disease.  Follow-up for reassessment and pelvic ultrasound in 2 weeks.  She does have significant dysmenorrhea has been present for several years.  We discussed the possibility of endometriosis.  If she does not experience symptom relief with antibiotics, we will discuss laparoscopy more seriously.  Continue with Nexplanon for now.    Kelvin Hinton MD  Obstetrics and Gynecology  [facility]

## 2021-02-24 ENCOUNTER — OFFICE VISIT (OUTPATIENT)
Dept: OBSTETRICS AND GYNECOLOGY | Facility: CLINIC | Age: 26
End: 2021-02-24

## 2021-02-24 ENCOUNTER — PREP FOR SURGERY (OUTPATIENT)
Dept: OTHER | Facility: HOSPITAL | Age: 26
End: 2021-02-24

## 2021-02-24 VITALS
BODY MASS INDEX: 41.65 KG/M2 | SYSTOLIC BLOOD PRESSURE: 118 MMHG | HEIGHT: 65 IN | DIASTOLIC BLOOD PRESSURE: 74 MMHG | WEIGHT: 250 LBS

## 2021-02-24 DIAGNOSIS — N94.6 DYSMENORRHEA: ICD-10-CM

## 2021-02-24 DIAGNOSIS — N94.10 FEMALE DYSPAREUNIA: ICD-10-CM

## 2021-02-24 DIAGNOSIS — R10.2 CHRONIC PELVIC PAIN IN FEMALE: Primary | ICD-10-CM

## 2021-02-24 DIAGNOSIS — G89.29 CHRONIC PELVIC PAIN IN FEMALE: Primary | ICD-10-CM

## 2021-02-24 DIAGNOSIS — Z97.5 NEXPLANON IN PLACE: ICD-10-CM

## 2021-02-24 PROCEDURE — 99214 OFFICE O/P EST MOD 30 MIN: CPT | Performed by: OBSTETRICS & GYNECOLOGY

## 2021-02-24 RX ORDER — SODIUM CHLORIDE 0.9 % (FLUSH) 0.9 %
3 SYRINGE (ML) INJECTION EVERY 12 HOURS SCHEDULED
Status: CANCELLED | OUTPATIENT
Start: 2021-02-24

## 2021-02-24 RX ORDER — SODIUM CHLORIDE 0.9 % (FLUSH) 0.9 %
10 SYRINGE (ML) INJECTION AS NEEDED
Status: CANCELLED | OUTPATIENT
Start: 2021-02-24

## 2021-02-24 NOTE — PROGRESS NOTES
"GYN Office Visit    Subjective   Chief Complaint   Patient presents with   • Follow-up     TVS done today for pelvic pain.     Nery Ibarra is a 25 y.o. year old  presenting to be seen for follow-up pelvic pain.    She reports some improvement with antibiotics, but continues to have cramping-type pain.  Vaginal cultures positive for BV only.  Ultrasound today.  Nexplanon still in place.    Past Medical History:   Diagnosis Date   • Anxiety    • Asthma    • Depression    • Gallstones    • GERD (gastroesophageal reflux disease)    • PCOS (polycystic ovarian syndrome)    • Urogenital trichomoniasis     Treated last year   • UTI (urinary tract infection)        Past Surgical History:   Procedure Laterality Date   • DENTAL PROCEDURE      Age 4       Family History   Adopted: Yes   Problem Relation Age of Onset   • Heart murmur Other         Social History     Tobacco Use   • Smoking status: Never Smoker   • Smokeless tobacco: Never Used   Substance Use Topics   • Alcohol use: Not Currently     Frequency: Never   • Drug use: No       (Not in a hospital admission)      Patient has no known allergies.    Current Outpatient Medications on File Prior to Visit   Medication Sig Dispense Refill   • [DISCONTINUED] doxycycline (VIBRAMYCIN) 100 MG capsule Take 1 capsule by mouth 2 (Two) Times a Day for 14 days. 28 capsule 0   • [DISCONTINUED] metroNIDAZOLE (Flagyl) 500 MG tablet Take 1 tablet by mouth 2 (Two) Times a Day for 14 days. 28 tablet 0     No current facility-administered medications on file prior to visit.        Social History    Tobacco Use      Smoking status: Never Smoker      Smokeless tobacco: Never Used         Objective   /74   Ht 165.1 cm (65\")   Wt 113 kg (250 lb)   BMI 41.60 kg/m²     Physical Exam:  General Appearance: alert, pleasant, appears stated age, interactive and cooperative  Breasts: Not performed.  Abdomen: no masses, no hepatomegaly, no splenomegaly, soft non-tender, no guarding " and no rebound tenderness  Pelvis:  Pelvic: Clinical staff was present for exam  External genitalia:  normal appearance of the external genitalia including Bartholin's and Peotone's glands.  :  urethral meatus normal;  Vagina:  normal pink mucosa without prolapse or lesions.  Cervix:  normal appearance. cervical motion tenderness is present;  Uterus:  normal size, shape and consistency. tenderness to palpation is present;  Adnexa:  normal bimanual exam of the adnexa.  Rectal:  digital rectal exam not performed; anus visually normal appearing.         Medical Decision Making:    No data reviewed.    Assessment   Chronic pelvic pain  Dysmenorrhea  Dyspareunia  Nexplanon in place     Plan    No orders of the defined types were placed in this encounter.      Medication Management: none    Procedures Performed: None    She had some improvement with antibiotics, but continues to complain of pain.  Her ultrasound is reassuring.  Her vaginal cultures were reassuring.  We discussed diagnostic laparoscopy to diagnose/treat endometriosis.  Patient would like to proceed with surgery.  Risks for the procedure were reviewed today.  All questions answered.    Kelvin Hinton MD  Obstetrics and Gynecology  [facility]

## 2021-03-30 ENCOUNTER — APPOINTMENT (OUTPATIENT)
Dept: PREADMISSION TESTING | Facility: HOSPITAL | Age: 26
End: 2021-03-30

## 2021-03-30 VITALS — BODY MASS INDEX: 42.59 KG/M2 | HEIGHT: 65 IN | WEIGHT: 255.6 LBS

## 2021-03-30 DIAGNOSIS — N94.6 DYSMENORRHEA: ICD-10-CM

## 2021-03-30 DIAGNOSIS — R10.2 CHRONIC PELVIC PAIN IN FEMALE: ICD-10-CM

## 2021-03-30 DIAGNOSIS — G89.29 CHRONIC PELVIC PAIN IN FEMALE: ICD-10-CM

## 2021-03-30 LAB
B-HCG UR QL: NEGATIVE
BASOPHILS # BLD AUTO: 0.07 10*3/MM3 (ref 0–0.2)
BASOPHILS NFR BLD AUTO: 0.7 % (ref 0–1.5)
BILIRUB UR QL STRIP: NEGATIVE
CLARITY UR: ABNORMAL
COLOR UR: YELLOW
DEPRECATED RDW RBC AUTO: 38.1 FL (ref 37–54)
EOSINOPHIL # BLD AUTO: 0.21 10*3/MM3 (ref 0–0.4)
EOSINOPHIL NFR BLD AUTO: 2 % (ref 0.3–6.2)
ERYTHROCYTE [DISTWIDTH] IN BLOOD BY AUTOMATED COUNT: 12.1 % (ref 12.3–15.4)
GLUCOSE UR STRIP-MCNC: NEGATIVE MG/DL
HCT VFR BLD AUTO: 43.9 % (ref 34–46.6)
HGB BLD-MCNC: 14.9 G/DL (ref 12–15.9)
HGB UR QL STRIP.AUTO: NEGATIVE
IMM GRANULOCYTES # BLD AUTO: 0.02 10*3/MM3 (ref 0–0.05)
IMM GRANULOCYTES NFR BLD AUTO: 0.2 % (ref 0–0.5)
KETONES UR QL STRIP: NEGATIVE
LEUKOCYTE ESTERASE UR QL STRIP.AUTO: NEGATIVE
LYMPHOCYTES # BLD AUTO: 3.1 10*3/MM3 (ref 0.7–3.1)
LYMPHOCYTES NFR BLD AUTO: 29.5 % (ref 19.6–45.3)
MCH RBC QN AUTO: 29.1 PG (ref 26.6–33)
MCHC RBC AUTO-ENTMCNC: 33.9 G/DL (ref 31.5–35.7)
MCV RBC AUTO: 85.7 FL (ref 79–97)
MONOCYTES # BLD AUTO: 0.7 10*3/MM3 (ref 0.1–0.9)
MONOCYTES NFR BLD AUTO: 6.7 % (ref 5–12)
NEUTROPHILS NFR BLD AUTO: 6.41 10*3/MM3 (ref 1.7–7)
NEUTROPHILS NFR BLD AUTO: 60.9 % (ref 42.7–76)
NITRITE UR QL STRIP: NEGATIVE
NRBC BLD AUTO-RTO: 0 /100 WBC (ref 0–0.2)
PH UR STRIP.AUTO: 8.5 [PH] (ref 5–8)
PLATELET # BLD AUTO: 296 10*3/MM3 (ref 140–450)
PMV BLD AUTO: 10.3 FL (ref 6–12)
PROT UR QL STRIP: NEGATIVE
RBC # BLD AUTO: 5.12 10*6/MM3 (ref 3.77–5.28)
SP GR UR STRIP: 1.02 (ref 1–1.03)
UROBILINOGEN UR QL STRIP: ABNORMAL
WBC # BLD AUTO: 10.51 10*3/MM3 (ref 3.4–10.8)

## 2021-03-30 PROCEDURE — U0004 COV-19 TEST NON-CDC HGH THRU: HCPCS

## 2021-03-30 PROCEDURE — 81025 URINE PREGNANCY TEST: CPT

## 2021-03-30 PROCEDURE — 85025 COMPLETE CBC W/AUTO DIFF WBC: CPT

## 2021-03-30 PROCEDURE — C9803 HOPD COVID-19 SPEC COLLECT: HCPCS

## 2021-03-30 PROCEDURE — 81003 URINALYSIS AUTO W/O SCOPE: CPT

## 2021-03-30 PROCEDURE — 36415 COLL VENOUS BLD VENIPUNCTURE: CPT

## 2021-03-30 RX ORDER — ACETAMINOPHEN 500 MG
1000 TABLET ORAL EVERY 6 HOURS PRN
COMMUNITY
End: 2021-04-02 | Stop reason: HOSPADM

## 2021-03-30 RX ORDER — ETONOGESTREL 68 MG/1
1 IMPLANT SUBCUTANEOUS ONCE
COMMUNITY
End: 2022-03-25 | Stop reason: HOSPADM

## 2021-03-31 LAB — SARS-COV-2 RNA NOSE QL NAA+PROBE: NOT DETECTED

## 2021-04-02 ENCOUNTER — ANESTHESIA EVENT (OUTPATIENT)
Dept: PERIOP | Facility: HOSPITAL | Age: 26
End: 2021-04-02

## 2021-04-02 ENCOUNTER — HOSPITAL ENCOUNTER (OUTPATIENT)
Facility: HOSPITAL | Age: 26
Setting detail: HOSPITAL OUTPATIENT SURGERY
Discharge: HOME | End: 2021-04-02
Attending: OBSTETRICS & GYNECOLOGY | Admitting: OBSTETRICS & GYNECOLOGY

## 2021-04-02 ENCOUNTER — ANESTHESIA (OUTPATIENT)
Dept: PERIOP | Facility: HOSPITAL | Age: 26
End: 2021-04-02

## 2021-04-02 VITALS
TEMPERATURE: 98.2 F | HEART RATE: 84 BPM | SYSTOLIC BLOOD PRESSURE: 129 MMHG | DIASTOLIC BLOOD PRESSURE: 68 MMHG | RESPIRATION RATE: 17 BRPM | OXYGEN SATURATION: 98 %

## 2021-04-02 DIAGNOSIS — G89.29 CHRONIC PELVIC PAIN IN FEMALE: ICD-10-CM

## 2021-04-02 DIAGNOSIS — R10.2 CHRONIC PELVIC PAIN IN FEMALE: ICD-10-CM

## 2021-04-02 DIAGNOSIS — N94.6 DYSMENORRHEA: ICD-10-CM

## 2021-04-02 PROCEDURE — 25010000002 FENTANYL CITRATE (PF) 100 MCG/2ML SOLUTION: Performed by: NURSE ANESTHETIST, CERTIFIED REGISTERED

## 2021-04-02 PROCEDURE — 25010000002 HYDROMORPHONE PER 4 MG: Performed by: NURSE ANESTHETIST, CERTIFIED REGISTERED

## 2021-04-02 PROCEDURE — S0260 H&P FOR SURGERY: HCPCS | Performed by: OBSTETRICS & GYNECOLOGY

## 2021-04-02 PROCEDURE — 25010000002 ONDANSETRON PER 1 MG: Performed by: NURSE ANESTHETIST, CERTIFIED REGISTERED

## 2021-04-02 PROCEDURE — 94799 UNLISTED PULMONARY SVC/PX: CPT

## 2021-04-02 PROCEDURE — 25010000002 MIDAZOLAM PER 1MG: Performed by: NURSE ANESTHETIST, CERTIFIED REGISTERED

## 2021-04-02 PROCEDURE — 25010000002 KETOROLAC TROMETHAMINE PER 15 MG: Performed by: NURSE ANESTHETIST, CERTIFIED REGISTERED

## 2021-04-02 PROCEDURE — 25010000002 HALOPERIDOL LACTATE PER 5 MG: Performed by: NURSE ANESTHETIST, CERTIFIED REGISTERED

## 2021-04-02 PROCEDURE — 25010000002 DEXAMETHASONE PER 1 MG: Performed by: NURSE ANESTHETIST, CERTIFIED REGISTERED

## 2021-04-02 PROCEDURE — 25010000002 PROPOFOL 200 MG/20ML EMULSION: Performed by: NURSE ANESTHETIST, CERTIFIED REGISTERED

## 2021-04-02 PROCEDURE — 49320 DIAG LAPARO SEPARATE PROC: CPT | Performed by: OBSTETRICS & GYNECOLOGY

## 2021-04-02 RX ORDER — PROMETHAZINE HYDROCHLORIDE 25 MG/1
25 TABLET ORAL ONCE AS NEEDED
Status: DISCONTINUED | OUTPATIENT
Start: 2021-04-02 | End: 2021-04-02 | Stop reason: HOSPADM

## 2021-04-02 RX ORDER — SODIUM CHLORIDE 0.9 % (FLUSH) 0.9 %
3 SYRINGE (ML) INJECTION EVERY 12 HOURS SCHEDULED
Status: DISCONTINUED | OUTPATIENT
Start: 2021-04-02 | End: 2021-04-02 | Stop reason: HOSPADM

## 2021-04-02 RX ORDER — SODIUM CHLORIDE 0.9 % (FLUSH) 0.9 %
10 SYRINGE (ML) INJECTION AS NEEDED
Status: DISCONTINUED | OUTPATIENT
Start: 2021-04-02 | End: 2021-04-02 | Stop reason: HOSPADM

## 2021-04-02 RX ORDER — ONDANSETRON 2 MG/ML
4 INJECTION INTRAMUSCULAR; INTRAVENOUS ONCE AS NEEDED
Status: COMPLETED | OUTPATIENT
Start: 2021-04-02 | End: 2021-04-02

## 2021-04-02 RX ORDER — ONDANSETRON 2 MG/ML
INJECTION INTRAMUSCULAR; INTRAVENOUS AS NEEDED
Status: DISCONTINUED | OUTPATIENT
Start: 2021-04-02 | End: 2021-04-02 | Stop reason: SURG

## 2021-04-02 RX ORDER — MEPERIDINE HYDROCHLORIDE 25 MG/ML
12.5 INJECTION INTRAMUSCULAR; INTRAVENOUS; SUBCUTANEOUS
Status: DISCONTINUED | OUTPATIENT
Start: 2021-04-02 | End: 2021-04-02 | Stop reason: HOSPADM

## 2021-04-02 RX ORDER — ROCURONIUM BROMIDE 10 MG/ML
INJECTION, SOLUTION INTRAVENOUS AS NEEDED
Status: DISCONTINUED | OUTPATIENT
Start: 2021-04-02 | End: 2021-04-02 | Stop reason: SURG

## 2021-04-02 RX ORDER — IBUPROFEN 800 MG/1
800 TABLET ORAL EVERY 8 HOURS PRN
Qty: 30 TABLET | Refills: 0 | Status: SHIPPED | OUTPATIENT
Start: 2021-04-02 | End: 2022-02-15

## 2021-04-02 RX ORDER — LIDOCAINE HYDROCHLORIDE 20 MG/ML
INJECTION, SOLUTION INTRAVENOUS AS NEEDED
Status: DISCONTINUED | OUTPATIENT
Start: 2021-04-02 | End: 2021-04-02 | Stop reason: SURG

## 2021-04-02 RX ORDER — HALOPERIDOL 5 MG/ML
INJECTION INTRAMUSCULAR AS NEEDED
Status: DISCONTINUED | OUTPATIENT
Start: 2021-04-02 | End: 2021-04-02 | Stop reason: SURG

## 2021-04-02 RX ORDER — NEOSTIGMINE METHYLSULFATE 5 MG/5 ML
SYRINGE (ML) INTRAVENOUS AS NEEDED
Status: DISCONTINUED | OUTPATIENT
Start: 2021-04-02 | End: 2021-04-02 | Stop reason: SURG

## 2021-04-02 RX ORDER — FENTANYL CITRATE 50 UG/ML
INJECTION, SOLUTION INTRAMUSCULAR; INTRAVENOUS AS NEEDED
Status: DISCONTINUED | OUTPATIENT
Start: 2021-04-02 | End: 2021-04-02 | Stop reason: SURG

## 2021-04-02 RX ORDER — SCOLOPAMINE TRANSDERMAL SYSTEM 1 MG/1
1 PATCH, EXTENDED RELEASE TRANSDERMAL
Status: DISCONTINUED | OUTPATIENT
Start: 2021-04-02 | End: 2021-04-02 | Stop reason: HOSPADM

## 2021-04-02 RX ORDER — IBUPROFEN 600 MG/1
600 TABLET ORAL EVERY 6 HOURS PRN
Status: DISCONTINUED | OUTPATIENT
Start: 2021-04-02 | End: 2021-04-02 | Stop reason: HOSPADM

## 2021-04-02 RX ORDER — HYDROMORPHONE HCL 110MG/55ML
PATIENT CONTROLLED ANALGESIA SYRINGE INTRAVENOUS AS NEEDED
Status: DISCONTINUED | OUTPATIENT
Start: 2021-04-02 | End: 2021-04-02 | Stop reason: SURG

## 2021-04-02 RX ORDER — IPRATROPIUM BROMIDE AND ALBUTEROL SULFATE 2.5; .5 MG/3ML; MG/3ML
3 SOLUTION RESPIRATORY (INHALATION) ONCE AS NEEDED
Status: DISCONTINUED | OUTPATIENT
Start: 2021-04-02 | End: 2021-04-02 | Stop reason: HOSPADM

## 2021-04-02 RX ORDER — OXYCODONE HYDROCHLORIDE 5 MG/1
5 TABLET ORAL EVERY 4 HOURS PRN
Qty: 10 TABLET | Refills: 0 | Status: SHIPPED | OUTPATIENT
Start: 2021-04-02 | End: 2021-06-10

## 2021-04-02 RX ORDER — PROMETHAZINE HYDROCHLORIDE 25 MG/1
25 SUPPOSITORY RECTAL ONCE AS NEEDED
Status: DISCONTINUED | OUTPATIENT
Start: 2021-04-02 | End: 2021-04-02 | Stop reason: HOSPADM

## 2021-04-02 RX ORDER — SODIUM CHLORIDE, SODIUM LACTATE, POTASSIUM CHLORIDE, CALCIUM CHLORIDE 600; 310; 30; 20 MG/100ML; MG/100ML; MG/100ML; MG/100ML
1000 INJECTION, SOLUTION INTRAVENOUS CONTINUOUS
Status: DISCONTINUED | OUTPATIENT
Start: 2021-04-02 | End: 2021-04-02 | Stop reason: HOSPADM

## 2021-04-02 RX ORDER — DEXAMETHASONE SODIUM PHOSPHATE 4 MG/ML
INJECTION, SOLUTION INTRA-ARTICULAR; INTRALESIONAL; INTRAMUSCULAR; INTRAVENOUS; SOFT TISSUE AS NEEDED
Status: DISCONTINUED | OUTPATIENT
Start: 2021-04-02 | End: 2021-04-02 | Stop reason: SURG

## 2021-04-02 RX ORDER — HYDROCODONE BITARTRATE AND ACETAMINOPHEN 5; 325 MG/1; MG/1
1 TABLET ORAL ONCE AS NEEDED
Status: DISCONTINUED | OUTPATIENT
Start: 2021-04-02 | End: 2021-04-02 | Stop reason: HOSPADM

## 2021-04-02 RX ORDER — MIDAZOLAM HYDROCHLORIDE 2 MG/2ML
INJECTION, SOLUTION INTRAMUSCULAR; INTRAVENOUS AS NEEDED
Status: DISCONTINUED | OUTPATIENT
Start: 2021-04-02 | End: 2021-04-02 | Stop reason: SURG

## 2021-04-02 RX ORDER — ACETAMINOPHEN 500 MG
1000 TABLET ORAL EVERY 8 HOURS PRN
Qty: 60 TABLET | Refills: 0 | Status: SHIPPED | OUTPATIENT
Start: 2021-04-02 | End: 2022-02-15

## 2021-04-02 RX ORDER — KETOROLAC TROMETHAMINE 30 MG/ML
INJECTION, SOLUTION INTRAMUSCULAR; INTRAVENOUS AS NEEDED
Status: DISCONTINUED | OUTPATIENT
Start: 2021-04-02 | End: 2021-04-02 | Stop reason: SURG

## 2021-04-02 RX ORDER — PROPOFOL 10 MG/ML
INJECTION, EMULSION INTRAVENOUS AS NEEDED
Status: DISCONTINUED | OUTPATIENT
Start: 2021-04-02 | End: 2021-04-02 | Stop reason: SURG

## 2021-04-02 RX ADMIN — ONDANSETRON 4 MG: 2 INJECTION INTRAMUSCULAR; INTRAVENOUS at 11:10

## 2021-04-02 RX ADMIN — ROCURONIUM BROMIDE 30 MG: 10 INJECTION INTRAVENOUS at 09:09

## 2021-04-02 RX ADMIN — DEXAMETHASONE SODIUM PHOSPHATE 4 MG: 4 INJECTION, SOLUTION INTRAMUSCULAR; INTRAVENOUS at 09:13

## 2021-04-02 RX ADMIN — HALOPERIDOL LACTATE 0.5 MG: 5 INJECTION, SOLUTION INTRAMUSCULAR at 09:13

## 2021-04-02 RX ADMIN — HYDROMORPHONE HYDROCHLORIDE 0.5 MG: 2 INJECTION, SOLUTION INTRAMUSCULAR; INTRAVENOUS; SUBCUTANEOUS at 09:40

## 2021-04-02 RX ADMIN — LIDOCAINE HYDROCHLORIDE 60 MG: 20 INJECTION, SOLUTION INTRAVENOUS at 09:09

## 2021-04-02 RX ADMIN — FENTANYL CITRATE 50 MCG: 50 INJECTION INTRAMUSCULAR; INTRAVENOUS at 09:04

## 2021-04-02 RX ADMIN — MIDAZOLAM HYDROCHLORIDE 2 MG: 1 INJECTION, SOLUTION INTRAMUSCULAR; INTRAVENOUS at 09:04

## 2021-04-02 RX ADMIN — SODIUM CHLORIDE, POTASSIUM CHLORIDE, SODIUM LACTATE AND CALCIUM CHLORIDE: 600; 310; 30; 20 INJECTION, SOLUTION INTRAVENOUS at 09:38

## 2021-04-02 RX ADMIN — PROPOFOL 200 MG: 10 INJECTION, EMULSION INTRAVENOUS at 09:09

## 2021-04-02 RX ADMIN — SCOPALAMINE 1 PATCH: 1 PATCH, EXTENDED RELEASE TRANSDERMAL at 08:12

## 2021-04-02 RX ADMIN — ONDANSETRON 4 MG: 2 INJECTION INTRAMUSCULAR; INTRAVENOUS at 09:13

## 2021-04-02 RX ADMIN — GLYCOPYRROLATE 0.4 MG: 0.2 INJECTION, SOLUTION INTRAMUSCULAR; INTRAVENOUS at 09:37

## 2021-04-02 RX ADMIN — SODIUM CHLORIDE, POTASSIUM CHLORIDE, SODIUM LACTATE AND CALCIUM CHLORIDE 1000 ML: 600; 310; 30; 20 INJECTION, SOLUTION INTRAVENOUS at 08:13

## 2021-04-02 RX ADMIN — Medication 3 MG: at 09:37

## 2021-04-02 RX ADMIN — KETOROLAC TROMETHAMINE 15 MG: 30 INJECTION, SOLUTION INTRAMUSCULAR at 09:31

## 2021-04-02 NOTE — H&P
GYNECOLOGY HISTORY AND PHYSICAL    CHIEF COMPLAINT: Scheduled surgery    DIAGNOSIS:  Chronic pelvic pain  Dysmenorrhea  Dyspareunia  Nexplanon in place    ASSESSMENT/PLAN     25 y.o.  female who presents for scheduled diagnostic laparoscopy and all other indicated procedures.    - Proceed to the OR for scheduled surgery  - Risks for the procedure reviewed  - SCDs for DVT ppx  - Antibiotics: none    HISTORY OF PRESENT ILLNESS     25 y.o.  female who presents for the scheduled procedure listed above.    She has no complaints today and there are no interval changes to the history.    REVIEW OF SYSTEMS: A complete review of systems was performed and was specifically negative for headache, changes in vision, RUQ pain, shortness of breath, chest pain, lower extremity edema and dysuria.     HISTORY:  Obstetrical History:  OB History    Para Term  AB Living   1             SAB TAB Ectopic Molar Multiple Live Births                    # Outcome Date GA Lbr Keven/2nd Weight Sex Delivery Anes PTL Lv   1                 Past Medical History:  Past Medical History:   Diagnosis Date   • Anxiety    • Arthritis    • Asthma     no rescue inhaler   • Depression    • Gallstones    • GERD (gastroesophageal reflux disease)    • PCOS (polycystic ovarian syndrome)    • PONV (postoperative nausea and vomiting)    • Tattoo     x2   • Urogenital trichomoniasis     Treated last year   • UTI (urinary tract infection)        Past Surgical History:  Past Surgical History:   Procedure Laterality Date   • BILE DUCT STENT PLACEMENT  2020   • DENTAL PROCEDURE      Age 4   • ENDOSCOPY     • LAPAROSCOPIC CHOLECYSTECTOMY  2020       Social History:  Social History     Tobacco Use   • Smoking status: Current Some Day Smoker     Packs/day: 0.25     Types: Cigarettes   • Smokeless tobacco: Never Used   • Tobacco comment: 1 pack per week   Vaping Use   • Vaping Use: Some days   • Substances: Nicotine, Flavoring   • Devices:  Disposable   Substance Use Topics   • Alcohol use: Yes     Comment: rarely   • Drug use: No       Family History  Family History   Adopted: Yes   Problem Relation Age of Onset   • Heart murmur Other         Allergies: No Known Allergies    OBJECTIVE   VITALS:   See flowsheet    PHYSICAL EXAM:  GENERAL: NAD, alert  CHEST: No increased work of breathing, CTAB  CV: RRR, WWP  ABDOMEN: Soft, NTTP  EXTREMITIES:  Warm and well-perfused, nontender, nonedematous  NEURO: AAO x 3, CN II-XII grossly intact    No current facility-administered medications on file prior to encounter.     No current outpatient medications on file prior to encounter.       DIAGNOSTIC STUDIES:  Results from last 7 days   Lab Units 03/30/21  1449   WBC 10*3/mm3 10.51   HEMOGLOBIN g/dL 14.9   HEMATOCRIT % 43.9   PLATELETS 10*3/mm3 296       No results found for this or any previous visit (from the past 24 hour(s)).    Kelvin Hinton MD  Obstetrics and Gynecology  [facility]

## 2021-04-02 NOTE — ANESTHESIA PREPROCEDURE EVALUATION
Anesthesia Evaluation     Patient summary reviewed and Nursing notes reviewed   history of anesthetic complications: PONV  NPO Solid Status: > 8 hours  NPO Liquid Status: > 8 hours           Airway   Mallampati: II  TM distance: >3 FB  Neck ROM: full  No difficulty expected  Dental      Pulmonary - normal exam   (+) a smoker Current, asthma,  Cardiovascular         Neuro/Psych  (+) psychiatric history Anxiety and Depression,     GI/Hepatic/Renal/Endo    (+) obesity,  GERD,      Musculoskeletal     Abdominal    Substance History   (+) alcohol use,      OB/GYN          Other   arthritis,                      Anesthesia Plan    ASA 3     general     intravenous induction     Anesthetic plan, all risks, benefits, and alternatives have been provided, discussed and informed consent has been obtained with: patient.    Plan discussed with CRNA.

## 2021-04-02 NOTE — DISCHARGE INSTRUCTIONS
Pelvic Surgery  Home Care Instructions:  Dr. Kelvin Hinton      Thank you for choosing [facility]. Please let us know if you have questions or concerns or do not understand the information we give you. Always ask us to explain words or phrases you do not understand.    You have had pelvic surgery. Here are some basic guidelines to help you recover more quickly at home. Your doctor may give you more guidelines. If you have any questions after you go home, please call the numbers listed on the next page.    General Guidelines    1. You may resume normal daily activities.  2. Do not put anything in the vagina (no tampons or douching).    3.   Do not have sex until cleared by your physician.  4.   You may climb steps.  5. You may bathe or shower.  6. The only exercise you should do is walking. This is important for your recovery.  7. Do not drive while taking narcotics.  8. Take the medicines you were taking before surgery. Other medicines you need to take at home are:    Alternate Motrin and Tylenol around the clock. Take each every 8 hours in alternation so that you are getting one of the medications every 4 hours.   Roxicodone 5mg tab  - One tablet by mouth every 4-6 hours as needed for breakthrough pain   Metamucil + Colace daily to keep bowel movements soft        If you have questions about your medicines, let us know. Or, talk to your local pharmacist.    9. Surgery, lack of activity, pain medicines and iron pills tend to cause constipation. Take something every day to prevent constipation and straining.  Taking something like Metamucil® every day to increase fiber may prevent constipation. Follow the instructions on the container. If you need a gentle laxative, then something like Milk of Magnesia® or Correctol® work fairly well. You should not need to take laxatives often.    10. Call your doctor if you have:     a. Fever of 101 degrees or higher.  b. Heavy vaginal bleeding.  c. Increased  redness around your incision (cut); incision pulling apart; drainage (pus), bleeding, or a large amount of fluid from your incision.  d. Worsening nausea or pain.  e. Other problems or concern.    If you have any questions or concerns about your usual routines, please talk to the nurse or doctor.       To talk with your doctor at [facility], call:  Obstetrics and Gynecology Outpatient Clinic  Phone: [phone]      We appreciate the opportunity you have given us to participate in your care.

## 2021-04-02 NOTE — OP NOTE
Vishal Ibarra  : 1995  MRN: [id number]  CSN: [id number]  Date: 2021    Operative Report    Pre-op Diagnosis:  Chronic pelvic pain  Dysmenorrhea  Dyspareunia  Nexplanon in place   Post-op Diagnosis:  Same   Procedure: DIAGNOSTIC LAPAROSCOPY   Surgeon:  Surgical assistant: JENNIFER Horn was responsible for performing the following activities: Closing, Placing Dressing and Manipulation of laparoscopic instruments and their skilled assistance was necessary for the success of this case.     OR Staff: Circulator: Brittnee Lawler RN  Scrub Person: Krissy Lopez; Adarsh Mustafa CSA     Anesthesia: General   Estimated Blood Loss: 10 mls   ABx: none   Specimens:  None       Complications: None         Findings:   Normal pelvic anatomy.  No evidence of endometriosis.    Description of Procedure:   After the appropriate time out and adequate dosing of her anesthesia, the patient was prepped and draped in the usual sterile fashion. The patient was placed in the dorsal lithotomy position using Salvatore stirrups. A bhardwaj catheter had been placed in the bladder for drainage during the procedure. A sponge stick was placed in the vagina for uterine manipulation. The manipulator was covered over with a sterile towel.     Attention was then turned to the abdomen. An infraumbilical skin incision was made with the scalpel and a 5 mm trocar was inserted under direct visualization without any complications. The abdomen was insufflated with CO2 being sure to keep the pressure less than 15 mmHg. The patient was placed in Trendelenburg position. A 5 mm trocar was then inserted in the left lower quadrant with the aid of transillumination and after identification of the inferior epigastric vessels. The ports were placed under direct visualization without any complications and all entry sites were inspected and found to be atraumatic. The abdomen and pelvis were then explored with the above  findings noted.     The pelvis was suctioned.  All instruments were removed from the patient and all trocars were removed under direct visualization. The pneumoperitoneum was evacuated and the skin incisions were made hemostatic with the Bovie. All skin incisions were closed with a 3-0 Monocryl suture in a subcuticular fashion. Steri-Strips were applied. All counts were correct at the end of the procedure.  There were no complications. The patient tolerated the procedure well.  She was taken to the postoperative recovery room in stable condition.    Kelvin Hinton MD  Obstetrics and Gynecology  [facility]

## 2021-04-02 NOTE — ANESTHESIA POSTPROCEDURE EVALUATION
Patient: Nery Ibarra    Procedure Summary     Date: 04/02/21 Room / Location:  [facility] OR 2 /  [facility]    Anesthesia Start: 0901 Anesthesia Stop: 0950    Procedure: DIAGNOSTIC LAPAROSCOPY AND ALL OTHER INDICATED PROCEDURES (N/A Abdomen) Diagnosis:       Chronic pelvic pain in female      Dysmenorrhea      (Chronic pelvic pain in female [R10.2, G89.29])      (Dysmenorrhea [N94.6])    Surgeons: Kelvin Hinton MD Provider: Earlene Molina CRNA    Anesthesia Type: general ASA Status: 3          Anesthesia Type: general    Vitals  Vitals Value Taken Time   /68 04/02/21 1044   Temp 97.4 °F (36.3 °C) 04/02/21 1044   Pulse 93 04/02/21 1048   Resp 16 04/02/21 1044   SpO2 100 % 04/02/21 1048   Vitals shown include unvalidated device data.        Post Anesthesia Care and Evaluation    Patient location during evaluation: PHASE II  Patient participation: complete - patient participated  Level of consciousness: awake and alert  Pain score: 2  Pain management: satisfactory to patient  Airway patency: patent  Anesthetic complications: No anesthetic complications  PONV Status: none  Cardiovascular status: acceptable and stable  Respiratory status: acceptable  Hydration status: acceptable

## 2021-04-02 NOTE — ANESTHESIA PROCEDURE NOTES
Airway  Urgency: elective    Date/Time: 4/2/2021 9:10 AM  Airway not difficult    General Information and Staff    Patient location during procedure: OR  CRNA: Earlene Molina CRNA    Indications and Patient Condition  Indications for airway management: airway protection    Preoxygenated: yes  MILS not maintained throughout  Mask difficulty assessment: 1 - vent by mask    Final Airway Details  Final airway type: endotracheal airway      Successful airway: ETT  Cuffed: yes   Successful intubation technique: video laryngoscopy  Facilitating devices/methods: intubating stylet  Endotracheal tube insertion site: oral  Blade: Glidescope  Blade size: 3  ETT size (mm): 7.0  Cormack-Lehane Classification: grade IIb - view of arytenoids or posterior of glottis only  Placement verified by: chest auscultation and capnometry   Measured from: lips  ETT/EBT  to lips (cm): 21  Number of attempts at approach: 2  Assessment: lips, teeth, and gum same as pre-op and atraumatic intubation    Additional Comments  Negative epigastric sounds, Breath sound equal bilaterally with symmetric chest rise and fall, unable to see glottic opening with direct laryngoscopy due to enlarged tonsils, glidescope used without difficulty

## 2021-04-09 ENCOUNTER — OFFICE VISIT (OUTPATIENT)
Dept: OBSTETRICS AND GYNECOLOGY | Facility: CLINIC | Age: 26
End: 2021-04-09

## 2021-04-09 VITALS
HEIGHT: 65 IN | BODY MASS INDEX: 41.82 KG/M2 | DIASTOLIC BLOOD PRESSURE: 78 MMHG | WEIGHT: 251 LBS | SYSTOLIC BLOOD PRESSURE: 122 MMHG

## 2021-04-09 DIAGNOSIS — F32.A DEPRESSION, UNSPECIFIED DEPRESSION TYPE: ICD-10-CM

## 2021-04-09 DIAGNOSIS — Z98.890 S/P LAPAROSCOPY: Primary | ICD-10-CM

## 2021-04-09 PROCEDURE — 99024 POSTOP FOLLOW-UP VISIT: CPT | Performed by: OBSTETRICS & GYNECOLOGY

## 2021-04-09 RX ORDER — FLUOXETINE HYDROCHLORIDE 20 MG/1
20 CAPSULE ORAL DAILY
Qty: 30 CAPSULE | Refills: 5 | Status: SHIPPED | OUTPATIENT
Start: 2021-04-09 | End: 2021-04-12 | Stop reason: SDUPTHER

## 2021-04-12 ENCOUNTER — TELEPHONE (OUTPATIENT)
Dept: OBSTETRICS AND GYNECOLOGY | Facility: CLINIC | Age: 26
End: 2021-04-12

## 2021-04-12 DIAGNOSIS — F32.A DEPRESSION, UNSPECIFIED DEPRESSION TYPE: ICD-10-CM

## 2021-04-12 RX ORDER — FLUOXETINE HYDROCHLORIDE 20 MG/1
20 CAPSULE ORAL DAILY
Qty: 30 CAPSULE | Refills: 5 | Status: SHIPPED | OUTPATIENT
Start: 2021-04-12 | End: 2021-06-10 | Stop reason: SDUPTHER

## 2021-04-12 NOTE — TELEPHONE ENCOUNTER
----- Message from Barbara Ybarra sent at 4/9/2021  4:18 PM EDT -----  Regarding: RX TO WRONG PHARMACY  Contact: PT  PT SAW DR FOLEY TODAY AND SAID RX NEEDS SENT TO [facility], NOT [facility].  THANKS

## 2021-04-20 NOTE — PROGRESS NOTES
"Postoperative Assessment Visit    Subjective   Chief Complaint   Patient presents with   • Post-op     7 days post op DX Lap 2021. Patient is doing well after surgery, would like to discuss medication for depression       25 y.o.  female who presents for a post-op visit.    Pre-op Diagnosis:  Chronic pelvic pain  Dysmenorrhea  Dyspareunia  Nexplanon in place   Post-op Diagnosis:  Same   Procedure: DIAGNOSTIC LAPAROSCOPY     The patient is doing well following her procedure, but does note worsening depression.  No SI/HI..     Objective   Vitals:    21 1541   BP: 122/78   Weight: 114 kg (251 lb)   Height: 165.1 cm (65\")     Physical Exam:  Incision(s): Well-healing, no signs of infection or separation  Reassuring postoperative exam       Medical Decision Making:    I have reviewed the operative note.  I have reviewed the postoperative labs where appropriate.    Assessment   Diagnostic laparoscopy  Post-op evaluation  Depression  Nexplanon in place     Plan    No orders of the defined types were placed in this encounter.      Medication Management: Prozac    Patient is meeting all post-op milestones.  Surgical findings discussed.  Start Prozac.  Continue with Nexplanon for now.    RTC for reassessment in 3-6 months.    Kelvin Hinton MD  Obstetrics and Gynecology  [facility]    "

## 2021-06-10 ENCOUNTER — OFFICE VISIT (OUTPATIENT)
Dept: OBSTETRICS AND GYNECOLOGY | Facility: CLINIC | Age: 26
End: 2021-06-10

## 2021-06-10 VITALS
BODY MASS INDEX: 42.15 KG/M2 | DIASTOLIC BLOOD PRESSURE: 84 MMHG | WEIGHT: 253 LBS | SYSTOLIC BLOOD PRESSURE: 126 MMHG | HEIGHT: 65 IN

## 2021-06-10 DIAGNOSIS — F32.A DEPRESSION, UNSPECIFIED DEPRESSION TYPE: ICD-10-CM

## 2021-06-10 DIAGNOSIS — Z11.3 SCREENING FOR STD (SEXUALLY TRANSMITTED DISEASE): Primary | ICD-10-CM

## 2021-06-10 PROCEDURE — 99213 OFFICE O/P EST LOW 20 MIN: CPT | Performed by: MIDWIFE

## 2021-06-10 RX ORDER — FLUOXETINE HYDROCHLORIDE 20 MG/1
20 CAPSULE ORAL DAILY
Qty: 30 CAPSULE | Refills: 5 | Status: SHIPPED | OUTPATIENT
Start: 2021-06-10 | End: 2022-02-15

## 2021-06-10 NOTE — PROGRESS NOTES
"Chief Complaint   Patient presents with   • Follow-up     Patient would like STD testing. Does not want swab, would like blood work. Patient is not having any symptoms, one of her partners tested positive for an STD.       Nery Ibarra is a 25 y.o. year old  presenting to be seen for STD screening.  She states one of her partners tested + for STD but unsure which STD it was.  She denies any symptoms such as vaginal discharge, odor, or burning.  She never got her Prozac Rx that was sent in. States pharmacy didn't receive Rx.    History  Past Medical History:   Diagnosis Date   • Anxiety    • Arthritis    • Asthma     no rescue inhaler   • Depression    • Gallstones    • GERD (gastroesophageal reflux disease)    • PCOS (polycystic ovarian syndrome)    • PONV (postoperative nausea and vomiting)    • Tattoo     x2   • Urogenital trichomoniasis     Treated last year   • UTI (urinary tract infection)    , Allergies:  Patient has no known allergies.    Social History    Tobacco Use      Smoking status: Current Some Day Smoker        Packs/day: 0.25        Types: Cigarettes      Smokeless tobacco: Never Used      Tobacco comment: 1 pack per week      Review of Systems  Pertinent items are noted in HPI, all other systems reviewed and negative       Objective   /84   Ht 165.1 cm (65\")   Wt 115 kg (253 lb)   LMP  (LMP Unknown)   Breastfeeding No   BMI 42.10 kg/m²     Physical Exam:  General Appearance: alert, appears stated age and cooperative  Lungs: respirations regular, respirations even and respirations unlabored  Skin: no bleeding, bruising or rash and no lesions noted  Neurologic: Mental Status orientated to person, place, time and situation, Speech normal content and execusion    Lab Review   No data reviewed    Imaging   No data reviewed         Assessment /Plan    Diagnoses and all orders for this visit:    1. Screening for STD (sexually transmitted disease) (Primary)  -     NuSwab VG+ - Swab, " Vagina  Discussed types of STDs and tests for each. She opted to do NuSwab if she could do it herself. Instructed how to do.  2. Depression, unspecified depression type  -     FLUoxetine (PROzac) 20 MG capsule; Take 1 capsule by mouth Daily.  Dispense: 30 capsule; Refill: 5        New Medications Ordered This Visit   Medications   • FLUoxetine (PROzac) 20 MG capsule     Sig: Take 1 capsule by mouth Daily.     Dispense:  30 capsule     Refill:  5     Follow up PRN           This note was electronically signed.  Odalis Rodriges CNM  6/10/2021

## 2021-06-21 RX ORDER — METRONIDAZOLE 500 MG/1
500 TABLET ORAL 2 TIMES DAILY
Qty: 14 TABLET | Refills: 0 | Status: SHIPPED | OUTPATIENT
Start: 2021-06-21 | End: 2021-06-28

## 2021-07-20 ENCOUNTER — TELEPHONE (OUTPATIENT)
Dept: FAMILY MEDICINE CLINIC | Facility: CLINIC | Age: 26
End: 2021-07-20

## 2021-07-20 NOTE — TELEPHONE ENCOUNTER
Caller: Nery Ibarra    Relationship to patient: Self    Best call back number:     Chief complaint: DEPRESSION MENTAL HEALTH ISSUES. WANTING BACK ON  MEDS    Type of visit: OFFICE VISIT    Requested date: NEXT AVAILABLE        Additional notes: FIRST APPT AVAILABLE WITH NICOLE WAS [id number]...PATIENT WANTED TO GET IN THIS WEEK IF POSSIBLE AS SHE IS GOING OUT OF TOWN;

## 2021-07-20 NOTE — TELEPHONE ENCOUNTER
I called and spoke with patient. She states that she has already scheduled with another provider and hung up.

## 2022-02-15 ENCOUNTER — OFFICE VISIT (OUTPATIENT)
Dept: OBSTETRICS AND GYNECOLOGY | Facility: CLINIC | Age: 27
End: 2022-02-15

## 2022-02-15 ENCOUNTER — PREP FOR SURGERY (OUTPATIENT)
Dept: OTHER | Facility: HOSPITAL | Age: 27
End: 2022-02-15

## 2022-02-15 VITALS
BODY MASS INDEX: 41.48 KG/M2 | HEIGHT: 65 IN | DIASTOLIC BLOOD PRESSURE: 72 MMHG | SYSTOLIC BLOOD PRESSURE: 118 MMHG | WEIGHT: 249 LBS

## 2022-02-15 DIAGNOSIS — N93.9 ABNORMAL UTERINE BLEEDING (AUB): Primary | ICD-10-CM

## 2022-02-15 DIAGNOSIS — Z30.2 REQUEST FOR STERILIZATION: Primary | ICD-10-CM

## 2022-02-15 DIAGNOSIS — N93.9 ABNORMAL UTERINE BLEEDING (AUB): ICD-10-CM

## 2022-02-15 DIAGNOSIS — Z30.2 ENCOUNTER FOR STERILIZATION: ICD-10-CM

## 2022-02-15 DIAGNOSIS — Z30.46 NEXPLANON REMOVAL: ICD-10-CM

## 2022-02-15 DIAGNOSIS — Z97.5 NEXPLANON IN PLACE: ICD-10-CM

## 2022-02-15 PROCEDURE — 99214 OFFICE O/P EST MOD 30 MIN: CPT | Performed by: OBSTETRICS & GYNECOLOGY

## 2022-02-15 RX ORDER — SODIUM CHLORIDE 0.9 % (FLUSH) 0.9 %
3 SYRINGE (ML) INJECTION EVERY 12 HOURS SCHEDULED
Status: CANCELLED | OUTPATIENT
Start: 2022-02-15

## 2022-02-15 RX ORDER — MELOXICAM 15 MG/1
15 TABLET ORAL DAILY
COMMUNITY
Start: 2022-01-09 | End: 2023-03-17

## 2022-02-15 RX ORDER — SODIUM CHLORIDE 0.9 % (FLUSH) 0.9 %
10 SYRINGE (ML) INJECTION AS NEEDED
Status: CANCELLED | OUTPATIENT
Start: 2022-02-15

## 2022-02-15 NOTE — PROGRESS NOTES
GYN Office Visit    Subjective   Chief Complaint   Patient presents with   • Consult     Wants to discuss tubal     Neryherbert Ibarra is a 26 y.o. year old  presenting to be seen for consultation regarding contraception.    She currently has a Nexplanon device that is due for removal at the end of this year.  She reports mood instability and cramping with this device.  She would like to discuss other options for birth control.  Her menses at baseline is very heavy and painful.  She is interested in permanent sterilization.    OB Hx:  x 1  Pap smear: Unsure    Past Medical History:   Diagnosis Date   • Anxiety    • Arthritis    • Asthma     no rescue inhaler   • Depression    • Gallstones    • GERD (gastroesophageal reflux disease)    • PCOS (polycystic ovarian syndrome)    • PONV (postoperative nausea and vomiting)    • Tattoo     x2   • Urogenital trichomoniasis     Treated last year   • UTI (urinary tract infection)        Past Surgical History:   Procedure Laterality Date   • BILE DUCT STENT PLACEMENT  2020   • DENTAL PROCEDURE      Age 4   • DIAGNOSTIC LAPAROSCOPY N/A 2021    Procedure: DIAGNOSTIC LAPAROSCOPY;  Surgeon: Kelvin Hinton MD;  Location: [facility];  Service: Obstetrics/Gynecology;  Laterality: N/A;   • ENDOSCOPY     • LAPAROSCOPIC CHOLECYSTECTOMY  2020       Family History   Adopted: Yes   Problem Relation Age of Onset   • Heart murmur Other         Social History     Tobacco Use   • Smoking status: Current Some Day Smoker     Packs/day: 0.25     Types: Cigarettes   • Smokeless tobacco: Never Used   • Tobacco comment: 1 pack per week   Vaping Use   • Vaping Use: Some days   • Substances: Nicotine, Flavoring   • Devices: Disposable   Substance Use Topics   • Alcohol use: Yes     Comment: rarely   • Drug use: No       (Not in a hospital admission)      Patient has no known allergies.    Current Outpatient Medications on File Prior to Visit   Medication Sig Dispense Refill   •  "Etonogestrel (Nexplanon) 68 MG implant subdermal implant Inject 1 each into the appropriate area of the skin as directed by provider 1 (One) Time.     • meloxicam (MOBIC) 15 MG tablet Take 15 mg by mouth Daily.     • [DISCONTINUED] acetaminophen (TYLENOL) 500 MG tablet Take 2 tablets by mouth Every 8 (Eight) Hours As Needed for Mild Pain . 60 tablet 0   • [DISCONTINUED] FLUoxetine (PROzac) 20 MG capsule Take 1 capsule by mouth Daily. 30 capsule 5   • [DISCONTINUED] ibuprofen (ADVIL,MOTRIN) 800 MG tablet Take 1 tablet by mouth Every 8 (Eight) Hours As Needed for Mild Pain 30 tablet 0     No current facility-administered medications on file prior to visit.       Social History    Tobacco Use      Smoking status: Current Some Day Smoker        Packs/day: 0.25        Types: Cigarettes      Smokeless tobacco: Never Used      Tobacco comment: 1 pack per week         Objective   /72   Ht 165.1 cm (65\")   Wt 113 kg (249 lb)   BMI 41.44 kg/m²     Physical Exam:  General Appearance: alert, pleasant, appears stated age, interactive and cooperative  Left arm: Nexplanon palpable       Medical Decision Making:    Assessment   Counseling regarding contraception  Abnormal uterine bleeding  Nexplanon in place     Plan    No orders of the defined types were placed in this encounter.      Medication Management: Mirena IUD    Procedures Performed: None    We reviewed her situation in detail today.  We discussed various options for contraception with emphasis on LARCs and sterilization.  Ultimately, the patient opted for permanent sterilization via laparoscopy + Mirena IUD to address her bleeding symptoms.  Risks for the procedure were reviewed today.  All questions answered.    She desires permanent sterilization. We discussed risks/ benefits/alternatives for permanent sterilization. We reviewed LARC methods. Risks include, but aren't limited to, regret, bleeding, infection, damage to surrounding organs, <1% failure rate and " high risk of ectopic pregnancy in the setting of failure. Patient instructed to seek medical care if she should ever think that she is pregnant in the future. Tubal papers signed today.    Plan for laparoscopic bilateral tubal ligation, Nexplanon removal, Mirena IUD insertion and Pap smear.    Kelvin Hinton MD  Obstetrics and Gynecology  [facility]

## 2022-03-23 ENCOUNTER — PRE-ADMISSION TESTING (OUTPATIENT)
Dept: PREADMISSION TESTING | Facility: HOSPITAL | Age: 27
End: 2022-03-23

## 2022-03-23 VITALS — HEIGHT: 67 IN | WEIGHT: 246.2 LBS | BODY MASS INDEX: 38.64 KG/M2

## 2022-03-23 DIAGNOSIS — Z01.818 PRE-OP TESTING: Primary | ICD-10-CM

## 2022-03-23 LAB
ANION GAP SERPL CALCULATED.3IONS-SCNC: 9.9 MMOL/L (ref 5–15)
B-HCG UR QL: NEGATIVE
BACTERIA UR QL AUTO: ABNORMAL /HPF
BASOPHILS # BLD AUTO: 0.05 10*3/MM3 (ref 0–0.2)
BASOPHILS NFR BLD AUTO: 0.7 % (ref 0–1.5)
BILIRUB UR QL STRIP: NEGATIVE
BUN SERPL-MCNC: 13 MG/DL (ref 6–20)
BUN/CREAT SERPL: 17.3 (ref 7–25)
CALCIUM SPEC-SCNC: 9.2 MG/DL (ref 8.6–10.5)
CHLORIDE SERPL-SCNC: 107 MMOL/L (ref 98–107)
CLARITY UR: ABNORMAL
CO2 SERPL-SCNC: 21.1 MMOL/L (ref 22–29)
COLOR UR: YELLOW
CREAT SERPL-MCNC: 0.75 MG/DL (ref 0.57–1)
DEPRECATED RDW RBC AUTO: 37.5 FL (ref 37–54)
EGFRCR SERPLBLD CKD-EPI 2021: 113.5 ML/MIN/1.73
EOSINOPHIL # BLD AUTO: 0.16 10*3/MM3 (ref 0–0.4)
EOSINOPHIL NFR BLD AUTO: 2.3 % (ref 0.3–6.2)
ERYTHROCYTE [DISTWIDTH] IN BLOOD BY AUTOMATED COUNT: 12.3 % (ref 12.3–15.4)
GLUCOSE SERPL-MCNC: 117 MG/DL (ref 65–99)
GLUCOSE UR STRIP-MCNC: NEGATIVE MG/DL
HCT VFR BLD AUTO: 40.8 % (ref 34–46.6)
HGB BLD-MCNC: 14.5 G/DL (ref 12–15.9)
HGB UR QL STRIP.AUTO: ABNORMAL
HYALINE CASTS UR QL AUTO: ABNORMAL /LPF
IMM GRANULOCYTES # BLD AUTO: 0.01 10*3/MM3 (ref 0–0.05)
IMM GRANULOCYTES NFR BLD AUTO: 0.1 % (ref 0–0.5)
KETONES UR QL STRIP: ABNORMAL
LEUKOCYTE ESTERASE UR QL STRIP.AUTO: ABNORMAL
LYMPHOCYTES # BLD AUTO: 1.97 10*3/MM3 (ref 0.7–3.1)
LYMPHOCYTES NFR BLD AUTO: 28.1 % (ref 19.6–45.3)
MCH RBC QN AUTO: 29.7 PG (ref 26.6–33)
MCHC RBC AUTO-ENTMCNC: 35.5 G/DL (ref 31.5–35.7)
MCV RBC AUTO: 83.6 FL (ref 79–97)
MONOCYTES # BLD AUTO: 0.47 10*3/MM3 (ref 0.1–0.9)
MONOCYTES NFR BLD AUTO: 6.7 % (ref 5–12)
MUCOUS THREADS URNS QL MICRO: ABNORMAL /HPF
NEUTROPHILS NFR BLD AUTO: 4.36 10*3/MM3 (ref 1.7–7)
NEUTROPHILS NFR BLD AUTO: 62.1 % (ref 42.7–76)
NITRITE UR QL STRIP: NEGATIVE
NRBC BLD AUTO-RTO: 0 /100 WBC (ref 0–0.2)
PH UR STRIP.AUTO: <=5 [PH] (ref 5–8)
PLATELET # BLD AUTO: 270 10*3/MM3 (ref 140–450)
PMV BLD AUTO: 10.8 FL (ref 6–12)
POTASSIUM SERPL-SCNC: 3.6 MMOL/L (ref 3.5–5.2)
PROT UR QL STRIP: ABNORMAL
RBC # BLD AUTO: 4.88 10*6/MM3 (ref 3.77–5.28)
RBC # UR STRIP: ABNORMAL /HPF
REF LAB TEST METHOD: ABNORMAL
SODIUM SERPL-SCNC: 138 MMOL/L (ref 136–145)
SP GR UR STRIP: 1.02 (ref 1–1.03)
SQUAMOUS #/AREA URNS HPF: ABNORMAL /HPF
UROBILINOGEN UR QL STRIP: ABNORMAL
WBC # UR STRIP: ABNORMAL /HPF
WBC NRBC COR # BLD: 7.02 10*3/MM3 (ref 3.4–10.8)

## 2022-03-23 PROCEDURE — 81025 URINE PREGNANCY TEST: CPT

## 2022-03-23 PROCEDURE — 85025 COMPLETE CBC W/AUTO DIFF WBC: CPT | Performed by: OBSTETRICS & GYNECOLOGY

## 2022-03-23 PROCEDURE — C9803 HOPD COVID-19 SPEC COLLECT: HCPCS

## 2022-03-23 PROCEDURE — U0004 COV-19 TEST NON-CDC HGH THRU: HCPCS

## 2022-03-23 PROCEDURE — 87086 URINE CULTURE/COLONY COUNT: CPT | Performed by: OBSTETRICS & GYNECOLOGY

## 2022-03-23 PROCEDURE — 81001 URINALYSIS AUTO W/SCOPE: CPT | Performed by: OBSTETRICS & GYNECOLOGY

## 2022-03-23 PROCEDURE — 80048 BASIC METABOLIC PNL TOTAL CA: CPT | Performed by: OBSTETRICS & GYNECOLOGY

## 2022-03-23 RX ORDER — ESCITALOPRAM OXALATE 10 MG/1
10 TABLET ORAL DAILY
COMMUNITY

## 2022-03-23 NOTE — DISCHARGE INSTRUCTIONS
PAT PASS GIVEN/REVIEWED WITH PT.  VERBALIZED UNDERSTANDING OF THE FOLLOWING:  DO NOT EAT, DRINK, SMOKE, USE SMOKELESS TOBACCO OR CHEW GUM AFTER MIDNIGHT THE NIGHT BEFORE SURGERY.  THIS ALSO INCLUDES HARD CANDIES AND MINTS.    DO NOT SHAVE THE AREA TO BE OPERATED ON AT LEAST 48 HOURS PRIOR TO THE PROCEDURE.  DO NOT WEAR MAKE UP OR NAIL POLISH.  DO NOT LEAVE IN ANY PIERCING OR WEAR JEWELRY THE DAY OF SURGERY.      DO NOT USE ADHESIVES IF YOU WEAR DENTURES.    DO NOT WEAR EYE CONTACTS; BRING IN YOUR GLASSES.    ONLY TAKE MEDICATION THE MORNING OF YOUR PROCEDURE IF INSTRUCTED BY YOUR SURGEON WITH ENOUGH WATER TO SWALLOW THE MEDICATION.  IF YOUR SURGEON DID NOT SPECIFY WHICH MEDICATIONS TO TAKE, YOU WILL NEED TO CALL THEIR OFFICE FOR FURTHER INSTRUCTIONS AND DO AS THEY INSTRUCT.    LEAVE ANYTHING YOU CONSIDER VALUABLE AT HOME.    YOU WILL NEED TO ARRANGE FOR SOMEONE TO DRIVE YOU HOME AFTER SURGERY.  IT IS RECOMMENDED THAT YOU DO NOT DRIVE, WORK, DRINK ALCOHOL OR MAKE MAJOR DECISIONS FOR AT LEAST 24 HOURS AFTER YOUR PROCEDURE IS COMPLETE.      THE DAY OF YOUR PROCEDURE, BRING IN THE FOLLOWING IF APPLICABLE:   PICTURE ID AND INSURANCE/MEDICARE OR MEDICAID CARDS/ANY CO-PAY THAT MAY BE DUE   COPY OF ADVANCED DIRECTIVE/LIVING WILL/POWER OR    CPAP/BIPAP/INHALERS   SKIN PREP SHEET   YOUR PREADMISSION TESTING PASS (IF NOT A PHONE HISTORY)       Chlorhexidine wipes along with instruction/verification sheet given to pt.  Instructed pt to date, time, and initial the verification sheet once skin prep has been  completed, and to [facility] [place] of the [place].  Pt. Verbalizes understanding.       COVID self-quarantine instructions reviewed with the pt.  Verbalized understanding.

## 2022-03-24 LAB
BACTERIA SPEC AEROBE CULT: NORMAL
SARS-COV-2 RNA PNL SPEC NAA+PROBE: NOT DETECTED

## 2022-03-25 ENCOUNTER — ANESTHESIA (OUTPATIENT)
Dept: PERIOP | Facility: HOSPITAL | Age: 27
End: 2022-03-25

## 2022-03-25 ENCOUNTER — ANESTHESIA EVENT (OUTPATIENT)
Dept: PERIOP | Facility: HOSPITAL | Age: 27
End: 2022-03-25

## 2022-03-25 ENCOUNTER — HOSPITAL ENCOUNTER (OUTPATIENT)
Facility: HOSPITAL | Age: 27
Setting detail: HOSPITAL OUTPATIENT SURGERY
Discharge: HOME | End: 2022-03-25
Attending: OBSTETRICS & GYNECOLOGY | Admitting: OBSTETRICS & GYNECOLOGY

## 2022-03-25 VITALS
TEMPERATURE: 97.1 F | OXYGEN SATURATION: 94 % | DIASTOLIC BLOOD PRESSURE: 71 MMHG | SYSTOLIC BLOOD PRESSURE: 126 MMHG | RESPIRATION RATE: 18 BRPM | HEART RATE: 89 BPM

## 2022-03-25 DIAGNOSIS — Z97.5 IUD (INTRAUTERINE DEVICE) IN PLACE: ICD-10-CM

## 2022-03-25 DIAGNOSIS — Z30.46 NEXPLANON REMOVAL: ICD-10-CM

## 2022-03-25 DIAGNOSIS — Z30.2 ENCOUNTER FOR STERILIZATION: ICD-10-CM

## 2022-03-25 DIAGNOSIS — Z98.51 S/P TUBAL LIGATION: Primary | ICD-10-CM

## 2022-03-25 DIAGNOSIS — N93.9 ABNORMAL UTERINE BLEEDING (AUB): ICD-10-CM

## 2022-03-25 PROCEDURE — 25010000002 ONDANSETRON PER 1 MG: Performed by: NURSE ANESTHETIST, CERTIFIED REGISTERED

## 2022-03-25 PROCEDURE — 11982 REMOVE DRUG IMPLANT DEVICE: CPT | Performed by: OBSTETRICS & GYNECOLOGY

## 2022-03-25 PROCEDURE — S0260 H&P FOR SURGERY: HCPCS | Performed by: OBSTETRICS & GYNECOLOGY

## 2022-03-25 PROCEDURE — 25010000002 KETOROLAC TROMETHAMINE PER 15 MG: Performed by: NURSE ANESTHETIST, CERTIFIED REGISTERED

## 2022-03-25 PROCEDURE — 25010000002 HYDROMORPHONE 1 MG/ML SOLUTION: Performed by: NURSE ANESTHETIST, CERTIFIED REGISTERED

## 2022-03-25 PROCEDURE — 58300 INSERT INTRAUTERINE DEVICE: CPT | Performed by: OBSTETRICS & GYNECOLOGY

## 2022-03-25 PROCEDURE — 25010000002 FENTANYL CITRATE (PF) 100 MCG/2ML SOLUTION: Performed by: NURSE ANESTHETIST, CERTIFIED REGISTERED

## 2022-03-25 PROCEDURE — 25010000002 DEXAMETHASONE PER 1 MG: Performed by: NURSE ANESTHETIST, CERTIFIED REGISTERED

## 2022-03-25 PROCEDURE — 58670 LAPAROSCOPY TUBAL CAUTERY: CPT | Performed by: OBSTETRICS & GYNECOLOGY

## 2022-03-25 PROCEDURE — C1889 IMPLANT/INSERT DEVICE, NOC: HCPCS | Performed by: OBSTETRICS & GYNECOLOGY

## 2022-03-25 PROCEDURE — 25010000002 PROPOFOL 200 MG/20ML EMULSION: Performed by: NURSE ANESTHETIST, CERTIFIED REGISTERED

## 2022-03-25 PROCEDURE — 25010000002 METOCLOPRAMIDE PER 10 MG: Performed by: NURSE ANESTHETIST, CERTIFIED REGISTERED

## 2022-03-25 DEVICE — IUD LEVONORGESTREL MIRENA 52MG: Type: IMPLANTABLE DEVICE | Site: UTERUS | Status: FUNCTIONAL

## 2022-03-25 RX ORDER — IBUPROFEN 800 MG/1
800 TABLET ORAL EVERY 8 HOURS PRN
Qty: 30 TABLET | Refills: 0 | Status: SHIPPED | OUTPATIENT
Start: 2022-03-25

## 2022-03-25 RX ORDER — IPRATROPIUM BROMIDE AND ALBUTEROL SULFATE 2.5; .5 MG/3ML; MG/3ML
3 SOLUTION RESPIRATORY (INHALATION) ONCE
Status: DISCONTINUED | OUTPATIENT
Start: 2022-03-25 | End: 2022-03-25 | Stop reason: HOSPADM

## 2022-03-25 RX ORDER — SODIUM CHLORIDE, SODIUM LACTATE, POTASSIUM CHLORIDE, CALCIUM CHLORIDE 600; 310; 30; 20 MG/100ML; MG/100ML; MG/100ML; MG/100ML
1000 INJECTION, SOLUTION INTRAVENOUS CONTINUOUS
Status: DISCONTINUED | OUTPATIENT
Start: 2022-03-25 | End: 2022-03-25 | Stop reason: HOSPADM

## 2022-03-25 RX ORDER — ACETAMINOPHEN 500 MG
1000 TABLET ORAL ONCE
Status: COMPLETED | OUTPATIENT
Start: 2022-03-25 | End: 2022-03-25

## 2022-03-25 RX ORDER — SCOLOPAMINE TRANSDERMAL SYSTEM 1 MG/1
1 PATCH, EXTENDED RELEASE TRANSDERMAL
Status: DISCONTINUED | OUTPATIENT
Start: 2022-03-25 | End: 2022-03-25 | Stop reason: HOSPADM

## 2022-03-25 RX ORDER — HYDROCODONE BITARTRATE AND ACETAMINOPHEN 5; 325 MG/1; MG/1
1 TABLET ORAL ONCE AS NEEDED
Status: DISCONTINUED | OUTPATIENT
Start: 2022-03-25 | End: 2022-03-25 | Stop reason: HOSPADM

## 2022-03-25 RX ORDER — SODIUM CHLORIDE 0.9 % (FLUSH) 0.9 %
10 SYRINGE (ML) INJECTION AS NEEDED
Status: DISCONTINUED | OUTPATIENT
Start: 2022-03-25 | End: 2022-03-25 | Stop reason: HOSPADM

## 2022-03-25 RX ORDER — ROCURONIUM BROMIDE 10 MG/ML
INJECTION, SOLUTION INTRAVENOUS AS NEEDED
Status: DISCONTINUED | OUTPATIENT
Start: 2022-03-25 | End: 2022-03-25 | Stop reason: SURG

## 2022-03-25 RX ORDER — LORAZEPAM 2 MG/ML
1 INJECTION INTRAMUSCULAR EVERY 4 HOURS PRN
Status: DISCONTINUED | OUTPATIENT
Start: 2022-03-25 | End: 2022-03-25 | Stop reason: HOSPADM

## 2022-03-25 RX ORDER — MAGNESIUM HYDROXIDE 1200 MG/15ML
LIQUID ORAL AS NEEDED
Status: DISCONTINUED | OUTPATIENT
Start: 2022-03-25 | End: 2022-03-25 | Stop reason: HOSPADM

## 2022-03-25 RX ORDER — FENTANYL CITRATE 50 UG/ML
INJECTION, SOLUTION INTRAMUSCULAR; INTRAVENOUS AS NEEDED
Status: DISCONTINUED | OUTPATIENT
Start: 2022-03-25 | End: 2022-03-25 | Stop reason: SURG

## 2022-03-25 RX ORDER — SODIUM CHLORIDE 0.9 % (FLUSH) 0.9 %
3 SYRINGE (ML) INJECTION EVERY 12 HOURS SCHEDULED
Status: DISCONTINUED | OUTPATIENT
Start: 2022-03-25 | End: 2022-03-25 | Stop reason: HOSPADM

## 2022-03-25 RX ORDER — KETOROLAC TROMETHAMINE 30 MG/ML
INJECTION, SOLUTION INTRAMUSCULAR; INTRAVENOUS AS NEEDED
Status: DISCONTINUED | OUTPATIENT
Start: 2022-03-25 | End: 2022-03-25 | Stop reason: SURG

## 2022-03-25 RX ORDER — MEPERIDINE HYDROCHLORIDE 25 MG/ML
25 INJECTION INTRAMUSCULAR; INTRAVENOUS; SUBCUTANEOUS
Status: DISCONTINUED | OUTPATIENT
Start: 2022-03-25 | End: 2022-03-25 | Stop reason: HOSPADM

## 2022-03-25 RX ORDER — MORPHINE SULFATE 2 MG/ML
2 INJECTION, SOLUTION INTRAMUSCULAR; INTRAVENOUS
Status: DISCONTINUED | OUTPATIENT
Start: 2022-03-25 | End: 2022-03-25 | Stop reason: HOSPADM

## 2022-03-25 RX ORDER — IBUPROFEN 600 MG/1
600 TABLET ORAL EVERY 6 HOURS PRN
Status: DISCONTINUED | OUTPATIENT
Start: 2022-03-25 | End: 2022-03-25 | Stop reason: HOSPADM

## 2022-03-25 RX ORDER — LIDOCAINE HYDROCHLORIDE 20 MG/ML
INJECTION, SOLUTION INTRAVENOUS AS NEEDED
Status: DISCONTINUED | OUTPATIENT
Start: 2022-03-25 | End: 2022-03-25 | Stop reason: SURG

## 2022-03-25 RX ORDER — ONDANSETRON 2 MG/ML
INJECTION INTRAMUSCULAR; INTRAVENOUS AS NEEDED
Status: DISCONTINUED | OUTPATIENT
Start: 2022-03-25 | End: 2022-03-25 | Stop reason: SURG

## 2022-03-25 RX ORDER — METOCLOPRAMIDE HYDROCHLORIDE 5 MG/ML
INJECTION INTRAMUSCULAR; INTRAVENOUS AS NEEDED
Status: DISCONTINUED | OUTPATIENT
Start: 2022-03-25 | End: 2022-03-25 | Stop reason: SURG

## 2022-03-25 RX ORDER — DEXAMETHASONE SODIUM PHOSPHATE 4 MG/ML
INJECTION, SOLUTION INTRA-ARTICULAR; INTRALESIONAL; INTRAMUSCULAR; INTRAVENOUS; SOFT TISSUE AS NEEDED
Status: DISCONTINUED | OUTPATIENT
Start: 2022-03-25 | End: 2022-03-25 | Stop reason: SURG

## 2022-03-25 RX ORDER — ACETAMINOPHEN 500 MG
1000 TABLET ORAL EVERY 8 HOURS PRN
Qty: 60 TABLET | Refills: 0 | Status: SHIPPED | OUTPATIENT
Start: 2022-03-25 | End: 2023-03-25

## 2022-03-25 RX ORDER — PROPOFOL 10 MG/ML
INJECTION, EMULSION INTRAVENOUS AS NEEDED
Status: DISCONTINUED | OUTPATIENT
Start: 2022-03-25 | End: 2022-03-25 | Stop reason: SURG

## 2022-03-25 RX ORDER — ONDANSETRON 2 MG/ML
4 INJECTION INTRAMUSCULAR; INTRAVENOUS ONCE AS NEEDED
Status: DISCONTINUED | OUTPATIENT
Start: 2022-03-25 | End: 2022-03-25 | Stop reason: HOSPADM

## 2022-03-25 RX ORDER — OXYCODONE HYDROCHLORIDE 5 MG/1
5 TABLET ORAL EVERY 4 HOURS PRN
Qty: 5 TABLET | Refills: 0 | OUTPATIENT
Start: 2022-03-25 | End: 2022-09-04

## 2022-03-25 RX ORDER — NEOSTIGMINE METHYLSULFATE 5 MG/5 ML
SYRINGE (ML) INTRAVENOUS AS NEEDED
Status: DISCONTINUED | OUTPATIENT
Start: 2022-03-25 | End: 2022-03-25 | Stop reason: SURG

## 2022-03-25 RX ADMIN — HYDROMORPHONE HYDROCHLORIDE 0.5 MG: 1 INJECTION, SOLUTION INTRAMUSCULAR; INTRAVENOUS; SUBCUTANEOUS at 09:39

## 2022-03-25 RX ADMIN — DEXAMETHASONE SODIUM PHOSPHATE 8 MG: 4 INJECTION, SOLUTION INTRAMUSCULAR; INTRAVENOUS at 08:38

## 2022-03-25 RX ADMIN — Medication 5 MG: at 09:13

## 2022-03-25 RX ADMIN — LIDOCAINE HYDROCHLORIDE 100 MG: 20 INJECTION, SOLUTION INTRAVENOUS at 08:38

## 2022-03-25 RX ADMIN — FENTANYL CITRATE 100 MCG: 50 INJECTION INTRAMUSCULAR; INTRAVENOUS at 08:38

## 2022-03-25 RX ADMIN — GLYCOPYRROLATE 0.8 MCG: 0.2 INJECTION, SOLUTION INTRAMUSCULAR; INTRAVITREAL at 09:13

## 2022-03-25 RX ADMIN — ACETAMINOPHEN 1000 MG: 500 TABLET ORAL at 07:50

## 2022-03-25 RX ADMIN — METOCLOPRAMIDE 10 MG: 5 INJECTION, SOLUTION INTRAMUSCULAR; INTRAVENOUS at 08:38

## 2022-03-25 RX ADMIN — SODIUM CHLORIDE, POTASSIUM CHLORIDE, SODIUM LACTATE AND CALCIUM CHLORIDE 1000 ML: 600; 310; 30; 20 INJECTION, SOLUTION INTRAVENOUS at 07:59

## 2022-03-25 RX ADMIN — SCOPALAMINE 1 PATCH: 1 PATCH, EXTENDED RELEASE TRANSDERMAL at 07:50

## 2022-03-25 RX ADMIN — KETOROLAC TROMETHAMINE 30 MG: 30 INJECTION, SOLUTION INTRAMUSCULAR at 08:38

## 2022-03-25 RX ADMIN — SODIUM CHLORIDE, POTASSIUM CHLORIDE, SODIUM LACTATE AND CALCIUM CHLORIDE: 600; 310; 30; 20 INJECTION, SOLUTION INTRAVENOUS at 09:14

## 2022-03-25 RX ADMIN — PROPOFOL 200 MG: 10 INJECTION, EMULSION INTRAVENOUS at 08:38

## 2022-03-25 RX ADMIN — ROCURONIUM BROMIDE 50 MG: 10 INJECTION INTRAVENOUS at 08:38

## 2022-03-25 RX ADMIN — ONDANSETRON 4 MG: 2 INJECTION INTRAMUSCULAR; INTRAVENOUS at 08:38

## 2022-03-25 NOTE — OP NOTE
Vishal Ibarra  : [id number]  CSN: [id number]  Date: 3/25/2022    Operative Note    Pre-op Diagnosis:  Request for permanent sterilization  Abnormal uterine bleeding  Nexplanon in place  Screening for malignancy of the cervix   Post-op Diagnosis:  Same    Procedure: Laparoscopic bilateral tubal ligation via fulguration  Pap smear  Mirena IUD insertion (office stock)  Nexplanon removal   Surgeon:  Surgical assistant:  OR Staff: JENNIFER Horn was responsible for performing the following activities: Retraction, Closing, Placing Dressing and Manipulation of laparoscopic instruments and their skilled assistance was necessary for the success of this case.    Circulator: Hilda Daniel RN; Nelly Akers RN  Scrub Person: Adarsh Mustafa CSA   Anesthesia: General endotracheal anethesia   Specimens:  EBL:   Complications: None  5 cc  None       Indications:   Patient is a 26 y.o. year old  who desires sterilization. We discussed risks/ benefits/alternatives to permanent sterilization including alternative LARC methods. Risks include, but aren't limited to regret, bleeding, infection, damage to surrounding organs, <1% failure rate, and high risk of ectopic pregnancy in setting of failure. Pt instructed to seek MD if she should ever think that she is pregnant in the future.    Findings:  Normal pelvic anatomy.  Anteverted uterus sounding to 8 cm.  Nexplanon palpable in appropriate position in the left arm, removed.    Procedure:   After the appropriate time out and adequate dosing of her anesthesia, the patient was prepped and draped in the usual sterile fashion.  A bhardwaj catheter was placed in the bladder for drainage during the procedure.  She was placed in the dorsal lithotomy position using Salvatore stirrups.  A Pap smear was collected prior to prep.  A sponge stick was placed in the vagina for uterine manipulation. A 5 mm infraumbilical skin incision was made with a scalpel.  A 5 mm trocar was inserted under direct without any complications. The abdomen was insufflated with CO2 making sure to keep the pressure less than 15 mmHg. The patient was placed in the Trendelenburg position. An ancillary 5 mm trocar was placed in the left lower quadrant lateral to the inferior epigastric vessels. Both trocar sites were noted to be atraumatic. The pelvis was explored with normal anatomy noted. The left fallopian tube was identified by its fimbriated end. The bipolar Kleppinger device was used to cauterize the length of the fallopian tube. Adequate blanching was noted throughout. The laparoscopic scissors were then used to transect the tube in 3 separate sites along the cauterized portion. The surgical site was noted to be hemostatic. This same procedure was performed on the contralateral tube. Repeat inspection revealed adequate hemostasis at all surgical sites. The lateral trocar was removed under direct visualization and the pneumoperitoneum was released. The umbilical trocar was then removed and both skin incisions were noted to be hemostatic. These sites were closed with 3-0 Monocryl suture in a subcuticular fashion. The uterine manipulator was removed with sufficient hemostasis.  The Mirena IUD was then prepared and placed per the 's recommendations.  The strings were trimmed to 5 cm.  Attention was then turned to the left arm which was prepped and draped appropriately.  A small, vertically oriented stab incision was made with an 11 blade scalpel.  The Nexplanon device was removed fully intact.  A Steri-Strip was placed and the arm was wrapped.  The patient tolerated the procedure well. There were no complications. All instruments and sponge counts were correct at the end of the procedure. She was taken to postoperative recovery room in stable condition.    Kelvin Hinton MD  Obstetrics and Gynecology  [facility]

## 2022-03-25 NOTE — DISCHARGE INSTRUCTIONS
Pelvic Surgery  Home Care Instructions:  Dr. Kelvin Hinton      Thank you for choosing [facility]. Please let us know if you have questions or concerns or do not understand the information we give you. Always ask us to explain words or phrases you do not understand.    You have had pelvic surgery. Here are some basic guidelines to help you recover more quickly at home. Your doctor may give you more guidelines. If you have any questions after you go home, please call the numbers listed on the next page.    General Guidelines    1. You may resume normal daily activities.  2. Do not put anything in the vagina (no tampons or douching).    3.   Do not have sex until cleared by your physician.  4.   You may climb steps.  5. You may bathe or shower.  6. The only exercise you should do is walking. This is important for your recovery.  7. Do not drive while taking narcotics.  8. Take the medicines you were taking before surgery. Other medicines you need to take at home are:    Alternate Motrin and Tylenol around the clock. Take each every 8 hours in alternation so that you are getting one of the medications every 4 hours.   Roxicodone 5mg tab  - One tablet by mouth every 4-6 hours as needed for breakthrough pain   Metamucil + Colace daily to keep bowel movements soft        If you have questions about your medicines, let us know. Or, talk to your local pharmacist.    Surgery, lack of activity, pain medicines and iron pills tend to cause constipation. Take something every day to prevent constipation and straining.  Taking something like Metamucil® every day to increase fiber may prevent constipation. Follow the instructions on the container. If you need a gentle laxative, then something like Milk of Magnesia® or Correctol® work fairly well. You should not need to take laxatives often.    10. Call your doctor if you have:     a. Fever of 101 degrees or higher.  b. Heavy vaginal bleeding.  c. Increased redness  around your incision (cut); incision pulling apart; drainage (pus), bleeding, or a large amount of fluid from your incision.  d. Worsening nausea or pain.  e. Other problems or concern.    If you have any questions or concerns about your usual routines, please talk to the nurse or doctor.       To talk with your doctor at [facility], call:  Obst[facility]s [facility] Gynecology Outpatient [facility]  Phone: [phone]      We appreciate the opportunity you have given us to participate in your care.     To assist you in voiding:  Drink plenty of fluids  Listen to running water while attempting to void.    If you are unable to urinate and you have an uncomfortable urge to void or it has been   6 hours since you were discharged, return to the Emergency Room     No pushing, pulling, tugging,  heavy lifting, or strenuous activity.  No major decision making, driving, or drinking alcoholic beverages for 24 hours. ( due to the medications you have  received)  Always use good hand hygiene/washing techniques.  NO driving while taking pain medications.    * if you have an incision:  Check your incision area every day for signs of infection.   Check for:  * more redness, swelling, or pain  *more fluid or blood  *warmth  *pus or bad smell

## 2022-03-25 NOTE — ANESTHESIA PROCEDURE NOTES
Airway  Urgency: elective    Date/Time: 3/25/2022 8:39 AM  Airway not difficult    General Information and Staff    Patient location during procedure: OR  CRNA: Vishal Jung CRNA    Indications and Patient Condition  Indications for airway management: airway protection    Preoxygenated: yes  Mask difficulty assessment: 1 - vent by mask    Final Airway Details  Final airway type: endotracheal airway      Successful airway: ETT  Cuffed: yes   Successful intubation technique: direct laryngoscopy  Facilitating devices/methods: intubating stylet  Endotracheal tube insertion site: oral  Blade: Sharif  Blade size: 4  ETT size (mm): 7.5  Cormack-Lehane Classification: grade I - full view of glottis  Placement verified by: chest auscultation and capnometry   Measured from: lips  ETT/EBT  to lips (cm): 21  Number of attempts at approach: 1  Assessment: lips, teeth, and gum same as pre-op and atraumatic intubation    Additional Comments  Dentition and Lips as preoperative assessment. Airway placed without complication. ETT cuff inflated to minimal occlusive pressure.

## 2022-03-25 NOTE — ANESTHESIA POSTPROCEDURE EVALUATION
Patient: Nery Ibarra    Procedure Summary     Date: 03/25/22 Room / Location: [facility] OR 2 /  [facility] OR    Anesthesia Start: 0834 Anesthesia Stop: 0929    Procedure: LAPAROSCOPIC BILATERAL TUBAL LIGATION, NEXPLANON REMOVAL. IUD INSERTION (N/A Abdomen) Diagnosis:       Abnormal uterine bleeding (AUB)      Encounter for sterilization      Nexplanon removal      (Abnormal uterine bleeding (AUB) [N93.9])      (Encounter for sterilization [Z30.2])      (Nexplanon removal [Z30.46])    Surgeons: Kelvin Hinton MD Provider: Vishal Jung CRNA    Anesthesia Type: general ASA Status: 2          Anesthesia Type: general    Vitals  Vitals Value Taken Time   /64 03/25/22 1020   Temp 97.1 °F (36.2 °C) 03/25/22 1020   Pulse 97 03/25/22 1029   Resp 12 03/25/22 1020   SpO2 90 % 03/25/22 1029           Post Anesthesia Care and Evaluation    Patient location during evaluation: PACU  Patient participation: complete - patient participated  Level of consciousness: awake  Pain score: 3  Pain management: adequate  Airway patency: patent  Anesthetic complications: No anesthetic complications  PONV Status: controlled  Cardiovascular status: acceptable and stable  Respiratory status: acceptable and face mask  Hydration status: acceptable

## 2022-03-25 NOTE — H&P
GYNECOLOGY HISTORY AND PHYSICAL    CHIEF COMPLAINT: Scheduled surgery    DIAGNOSIS:  Request for permanent sterilization  Abnormal uterine bleeding  Nexplanon in place  Screening for malignancy of the cervix    ASSESSMENT/PLAN     26 y.o.  female who presents for scheduled laparoscopic bilateral tubal ligation, Nexplanon removal, Pap smear and IUD insertion.    - Proceed to the OR for scheduled surgery  - Risks for the procedure reviewed  - SCDs for DVT ppx  - Antibiotics: none  - She desires permanent sterilization. We discussed risks/ benefits/alternatives for permanent sterilization. We reviewed LARC methods. Risks include, but aren't limited to, regret, bleeding, infection, damage to surrounding organs, <1% failure rate and high risk of ectopic pregnancy in the setting of failure. Patient instructed to seek medical care if she should ever think that she is pregnant in the future. Tubal papers signed.  Plan for laparoscopic bilateral tubal ligation via fulguration.    HISTORY OF PRESENT ILLNESS     26 y.o.  female who presents for the scheduled procedure listed above.    She has no complaints today and there are no interval changes to the history.    REVIEW OF SYSTEMS: A complete review of systems was performed and was specifically negative for headache, changes in vision, RUQ pain, shortness of breath, chest pain, lower extremity edema and dysuria.     HISTORY:  Obstetrical History:  OB History    Para Term  AB Living   1 1 1     1   SAB IAB Ectopic Molar Multiple Live Births             1      # Outcome Date GA Lbr Keven/2nd Weight Sex Delivery Anes PTL Lv   1 Term 10/03/19 38w6d  3374 g (7 lb 7 oz) F Vag-Vacuum EPI N SHEYLA      Complications: Other       Past Medical History:  Past Medical History:   Diagnosis Date   • Anxiety    • Arthritis    • Asthma     no rescue inhaler   • Depression    • Gallstones    • GERD (gastroesophageal reflux disease)    • PCOS (polycystic ovarian  syndrome)    • PONV (postoperative nausea and vomiting)    • Seasonal allergies    • Tattoo     x2   • Urogenital trichomoniasis     Treated last year   • UTI (urinary tract infection)        Past Surgical History:  Past Surgical History:   Procedure Laterality Date   • BILE DUCT STENT PLACEMENT  03/2020   • DENTAL PROCEDURE      Age 4   • DIAGNOSTIC LAPAROSCOPY N/A 4/2/2021    Procedure: DIAGNOSTIC LAPAROSCOPY;  Surgeon: Kelvin Hinton MD;  Location: [facility];  Service: Obstetrics/Gynecology;  Laterality: N/A;   • ENDOSCOPY     • LAPAROSCOPIC CHOLECYSTECTOMY  03/2020       Social History:  Social History     Tobacco Use   • Smoking status: Current Some Day Smoker     Packs/day: 0.25     Years: 2.00     Pack years: 0.50     Types: Cigarettes   • Smokeless tobacco: Never Used   • Tobacco comment: 1 pack per week   Vaping Use   • Vaping Use: Some days   • Substances: THC   • Devices: Pre-filled or refillable cartridge   Substance Use Topics   • Alcohol use: Never   • Drug use: Yes     Types: Marijuana     Comment: 5 times a week       Family History  Family History   Adopted: Yes   Problem Relation Age of Onset   • Heart murmur Other         Allergies: No Known Allergies    OBJECTIVE   VITALS:   See flowsheet    PHYSICAL EXAM:  GENERAL: NAD, alert  CHEST: No increased work of breathing, CTAB  CV: RRR, WWP  ABDOMEN: Soft, NTTP  EXTREMITIES:  Warm and well-perfused, nontender, nonedematous  NEURO: AAO x 3, CN II-XII grossly intact    No current facility-administered medications on file prior to encounter.     Current Outpatient Medications on File Prior to Encounter   Medication Sig Dispense Refill   • meloxicam (MOBIC) 15 MG tablet Take 15 mg by mouth Daily.     • Etonogestrel (Nexplanon) 68 MG implant subdermal implant Inject 1 each into the appropriate area of the skin as directed by provider 1 (One) Time.     • levonorgestrel (Mirena, 52 MG,) 20 MCG/24HR IUD 1 each by Intrauterine route 1 (One) Time for 1  dose. 1 each 0       DIAGNOSTIC STUDIES:  Results from last 7 days   Lab Units 03/23/22  1405   WBC 10*3/mm3 7.02   HEMOGLOBIN g/dL 14.5   HEMATOCRIT % 40.8   PLATELETS 10*3/mm3 270   SODIUM mmol/L 138   POTASSIUM mmol/L 3.6   CHLORIDE mmol/L 107   CO2 mmol/L 21.1*   BUN mg/dL 13   CREATININE mg/dL 0.75   GLUCOSE mg/dL 117*   CALCIUM mg/dL 9.2       No results found for this or any previous visit (from the past 24 hour(s)).    Kelvin Hinton MD  Obstetrics and Gynecology  [facility]

## 2022-04-01 ENCOUNTER — TELEPHONE (OUTPATIENT)
Dept: OBSTETRICS AND GYNECOLOGY | Facility: CLINIC | Age: 27
End: 2022-04-01

## 2022-04-01 LAB
LAB AP CASE REPORT: NORMAL
LAB AP CLINICAL INFORMATION: NORMAL

## 2022-04-01 NOTE — TELEPHONE ENCOUNTER
Have her remove the steri-strips today and schedule her a return visit in 4 weeks for an IUD string check. Thanks

## 2022-04-01 NOTE — TELEPHONE ENCOUNTER
----- Message from Krissy Rodrigueserd sent at 4/1/2022  1:05 PM EDT -----  Dr. Hinton's patient, she cancelled her post op appointment today and wanted to let Dr. Hinton know that she feels good and is doing well and doesn't really think she needs a post op appointment. She has transportation issues and has a hard time getting here.

## 2022-11-10 ENCOUNTER — TELEPHONE (OUTPATIENT)
Dept: OBSTETRICS AND GYNECOLOGY | Facility: CLINIC | Age: 27
End: 2022-11-10

## 2022-11-10 NOTE — TELEPHONE ENCOUNTER
SAME DAY CANCELLATION-CHILDCARE    WHEN I OFFERED 12/2/22 AS FIRST [facility] RESCHEDULE-PT STATED TO CANCEL THE APPT AND THAT SHE WASN'T GOING TO RESCHEDULE

## 2023-01-23 ENCOUNTER — APPOINTMENT (OUTPATIENT)
Dept: GENERAL RADIOLOGY | Facility: HOSPITAL | Age: 28
End: 2023-01-23
Payer: COMMERCIAL

## 2023-01-23 ENCOUNTER — HOSPITAL ENCOUNTER (EMERGENCY)
Facility: HOSPITAL | Age: 28
Discharge: HOME | End: 2023-01-23
Attending: EMERGENCY MEDICINE | Admitting: EMERGENCY MEDICINE
Payer: COMMERCIAL

## 2023-01-23 VITALS
DIASTOLIC BLOOD PRESSURE: 74 MMHG | HEART RATE: 98 BPM | RESPIRATION RATE: 20 BRPM | WEIGHT: 240 LBS | HEIGHT: 67 IN | SYSTOLIC BLOOD PRESSURE: 139 MMHG | BODY MASS INDEX: 37.67 KG/M2 | OXYGEN SATURATION: 98 % | TEMPERATURE: 98.2 F

## 2023-01-23 DIAGNOSIS — M25.551 RIGHT HIP PAIN: ICD-10-CM
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
## 2023-01-23 PROCEDURE — 73502 X-RAY EXAM HIP UNI 2-3 VIEWS: CPT

## 2023-01-23 PROCEDURE — 99283 EMERGENCY DEPT VISIT LOW MDM: CPT

## 2023-01-23 RX ORDER — ACETAMINOPHEN 500 MG
1000 TABLET ORAL ONCE
Status: COMPLETED | OUTPATIENT
Start: 2023-01-23 | End: 2023-01-23

## 2023-01-23 RX ADMIN — ACETAMINOPHEN 1000 MG: 500 TABLET ORAL at 22:54

## 2023-01-23 RX ADMIN — DICLOFENAC 2 G: 10 GEL TOPICAL at 22:54

## 2023-01-24 NOTE — ED PROVIDER NOTES
Subjective   History of Present Illness   Patient is a 27-year-old female with history of anxiety, arthritis, asthma, depression, GERD, PCOS, among others presenting to the ER with complaints of right hip pain after falling off of her porch last night.  Patient denies head trauma, loss of consciousness, anticoagulant use.  She states that she has been walking but with difficulty as it is painful.  She denies additional injuries or complaints at this time.  Denies any chance of pregnancy, states that she has an implant.      Review of Systems   Musculoskeletal:        Right hip pain   All other systems reviewed and are negative.      Past Medical History:   Diagnosis Date   • Anxiety    • Arthritis    • Asthma     no rescue inhaler   • Depression    • Gallstones    • GERD (gastroesophageal reflux disease)    • PCOS (polycystic ovarian syndrome)    • PONV (postoperative nausea and vomiting)    • Seasonal allergies    • Tattoo     x2   • Urogenital trichomoniasis     Treated last year   • UTI (urinary tract infection)        No Known Allergies    Past Surgical History:   Procedure Laterality Date   • BILE DUCT STENT PLACEMENT  03/2020   • DENTAL PROCEDURE      Age 4   • DIAGNOSTIC LAPAROSCOPY N/A 4/2/2021    Procedure: DIAGNOSTIC LAPAROSCOPY;  Surgeon: Kelvin Hinton MD;  Location: [facility];  Service: Obstetrics/Gynecology;  Laterality: N/A;   • ENDOSCOPY     • LAPAROSCOPIC CHOLECYSTECTOMY  03/2020   • TUBAL COAGULATION LAPAROSCOPIC N/A 3/25/2022    Procedure: LAPAROSCOPIC BILATERAL TUBAL LIGATION, NEXPLANON REMOVAL. IUD INSERTION;  Surgeon: Kelvin Hinton MD;  Location: [facility];  Service: Obstetrics/Gynecology;  Laterality: N/A;       Family History   Adopted: Yes   Problem Relation Age of Onset   • Heart murmur Other        Social History     Socioeconomic History   • Marital status: Single   Tobacco Use   • Smoking status: Former     Packs/day: 0.25     Years: 2.00     Pack years: 0.50     Types:  "Cigarettes   • Smokeless tobacco: Never   • Tobacco comments:     1 pack per week   Vaping Use   • Vaping Use: Former   • Substances: THC   • Devices: Pre-filled or refillable cartridge   Substance and Sexual Activity   • Alcohol use: Never   • Drug use: Not Currently     Types: Marijuana     Comment: 5 times a week   • Sexual activity: Defer     Comment: nexplanon           Objective   Physical Exam  Vitals and nursing note reviewed.   Constitutional:       General: She is not in acute distress.     Appearance: She is not toxic-appearing.   HENT:      Head: Normocephalic and atraumatic.      Right Ear: External ear normal.      Left Ear: External ear normal.      Nose: Nose normal.   Eyes:      Extraocular Movements: Extraocular movements intact.      Conjunctiva/sclera: Conjunctivae normal.   Cardiovascular:      Rate and Rhythm: Normal rate.   Pulmonary:      Effort: Pulmonary effort is normal. No respiratory distress.   Abdominal:      General: There is no distension.      Palpations: Abdomen is soft.      Tenderness: There is no abdominal tenderness.   Musculoskeletal:         General: Tenderness present. No swelling or deformity. Normal range of motion.      Cervical back: Normal range of motion and neck supple.      Comments: TTP of right hip, no overlying ecchymosis, no deformity, 2+ pulses   Skin:     General: Skin is warm and dry.   Neurological:      General: No focal deficit present.      Mental Status: She is alert and oriented to person, place, and time.   Psychiatric:         Mood and Affect: Mood normal.         Behavior: Behavior normal.         Procedures           ED Course                                 /74   Pulse 98   Temp 98.2 °F (36.8 °C) (Oral)   Resp 20   Ht 170.2 cm (67\")   Wt 109 kg (240 lb)   SpO2 98%   BMI 37.59 kg/m²             MDM   Patient was evaluated in the ER for right hip pain after a fall.  She is hemodynamically stable, no acute distress, nontoxic-appearing on " exam.  Differential diagnosis includes but is not limited to fracture, dislocation, sprain, soft tissue injury.  Initial plan includes x-ray of right hip and treatment with Voltaren gel and Tylenol.    X-ray was reviewed with ED attending, Dr. Martinez.  There were no obvious bony abnormalities identified.  Patient is agreeable with plan for discharge.  She was advised to rest and ice hip at home.  She was advised to follow-up with her PCP for further outpatient evaluation if symptoms persist.  Precautions were given for return to the ER for any new or worsening symptoms.    Final diagnoses:   Fall, initial encounter   Right hip pain       ED Disposition  ED Disposition     ED Disposition   Discharge    Condition   Stable    Comment   --             Nery Perla, APRN  [address] [place] [address] [id number] [phone]    Schedule an appointment as soon as possible for a visit   for further outpatient evaluation if symptoms persist    [facility] Emergency Department  [address] [facility] [address] [phone]  Go to   As needed, If symptoms worsen         Medication List      No changes were made to your prescriptions during this visit.          Lindsay Lazar PA-C  01/23/23 [id number]

## 2023-01-24 NOTE — DISCHARGE INSTRUCTIONS
Use Voltaren gel as prescribed as needed.  Use Tylenol as needed per directions on the package.  Rest and ice areas of pain at home.  Follow-up with your PCP for further outpatient evaluation if symptoms persist.  Return to the ER for new or worsening symptoms or acute concerns.

## 2023-01-31 ENCOUNTER — APPOINTMENT (OUTPATIENT)
Dept: GENERAL RADIOLOGY | Facility: HOSPITAL | Age: 28
End: 2023-01-31
Payer: COMMERCIAL

## 2023-01-31 ENCOUNTER — HOSPITAL ENCOUNTER (EMERGENCY)
Facility: HOSPITAL | Age: 28
Discharge: HOME | End: 2023-01-31
Attending: EMERGENCY MEDICINE | Admitting: EMERGENCY MEDICINE
Payer: COMMERCIAL

## 2023-01-31 VITALS
DIASTOLIC BLOOD PRESSURE: 48 MMHG | RESPIRATION RATE: 16 BRPM | BODY MASS INDEX: 39.99 KG/M2 | OXYGEN SATURATION: 97 % | HEART RATE: 65 BPM | WEIGHT: 240 LBS | SYSTOLIC BLOOD PRESSURE: 110 MMHG | HEIGHT: 65 IN | TEMPERATURE: 98.4 F

## 2023-01-31 DIAGNOSIS — S46.811A STRAIN OF RIGHT TRAPEZIUS MUSCLE, INITIAL ENCOUNTER: Primary | ICD-10-CM

## 2023-01-31 PROCEDURE — 72040 X-RAY EXAM NECK SPINE 2-3 VW: CPT

## 2023-01-31 PROCEDURE — 99283 EMERGENCY DEPT VISIT LOW MDM: CPT

## 2023-01-31 PROCEDURE — 25010000002 MORPHINE PER 10 MG: Performed by: EMERGENCY MEDICINE

## 2023-01-31 PROCEDURE — 25010000002 ONDANSETRON PER 1 MG: Performed by: EMERGENCY MEDICINE

## 2023-01-31 PROCEDURE — 25010000002 KETOROLAC TROMETHAMINE PER 15 MG: Performed by: EMERGENCY MEDICINE

## 2023-01-31 PROCEDURE — 96375 TX/PRO/DX INJ NEW DRUG ADDON: CPT

## 2023-01-31 PROCEDURE — 96374 THER/PROPH/DIAG INJ IV PUSH: CPT

## 2023-01-31 PROCEDURE — 25010000002 ORPHENADRINE CITRATE PER 60 MG: Performed by: EMERGENCY MEDICINE

## 2023-01-31 RX ORDER — LIDOCAINE 50 MG/G
1 PATCH TOPICAL ONCE
Status: DISCONTINUED | OUTPATIENT
Start: 2023-01-31 | End: 2023-01-31 | Stop reason: HOSPADM

## 2023-01-31 RX ORDER — ONDANSETRON 2 MG/ML
4 INJECTION INTRAMUSCULAR; INTRAVENOUS ONCE
Status: COMPLETED | OUTPATIENT
Start: 2023-01-31 | End: 2023-01-31

## 2023-01-31 RX ORDER — ORPHENADRINE CITRATE 30 MG/ML
60 INJECTION INTRAMUSCULAR; INTRAVENOUS ONCE
Status: COMPLETED | OUTPATIENT
Start: 2023-01-31 | End: 2023-01-31

## 2023-01-31 RX ORDER — KETOROLAC TROMETHAMINE 30 MG/ML
10 INJECTION, SOLUTION INTRAMUSCULAR; INTRAVENOUS ONCE
Status: COMPLETED | OUTPATIENT
Start: 2023-01-31 | End: 2023-01-31

## 2023-01-31 RX ORDER — KETOROLAC TROMETHAMINE 10 MG/1
10 TABLET, FILM COATED ORAL EVERY 6 HOURS PRN
Qty: 12 TABLET | Refills: 0 | Status: SHIPPED | OUTPATIENT
Start: 2023-01-31 | End: 2023-03-17

## 2023-01-31 RX ORDER — LIDOCAINE 50 MG/G
1 PATCH TOPICAL EVERY 24 HOURS
Qty: 6 EACH | Refills: 0 | Status: SHIPPED | OUTPATIENT
Start: 2023-01-31

## 2023-01-31 RX ORDER — CYCLOBENZAPRINE HCL 10 MG
10 TABLET ORAL 3 TIMES DAILY PRN
Qty: 15 TABLET | Refills: 0 | Status: SHIPPED | OUTPATIENT
Start: 2023-01-31 | End: 2023-03-17

## 2023-01-31 RX ADMIN — KETOROLAC TROMETHAMINE 10 MG: 30 INJECTION, SOLUTION INTRAMUSCULAR; INTRAVENOUS at 17:21

## 2023-01-31 RX ADMIN — LIDOCAINE 1 PATCH: 50 PATCH TOPICAL at 17:23

## 2023-01-31 RX ADMIN — ONDANSETRON 4 MG: 2 INJECTION INTRAMUSCULAR; INTRAVENOUS at 17:18

## 2023-01-31 RX ADMIN — MORPHINE SULFATE 4 MG: 4 INJECTION, SOLUTION INTRAMUSCULAR; INTRAVENOUS at 17:22

## 2023-01-31 RX ADMIN — ORPHENADRINE CITRATE 60 MG: 30 INJECTION INTRAMUSCULAR; INTRAVENOUS at 17:19

## 2023-02-12 ENCOUNTER — HOSPITAL ENCOUNTER (EMERGENCY)
Facility: HOSPITAL | Age: 28
Discharge: HOME | End: 2023-02-12
Attending: EMERGENCY MEDICINE | Admitting: EMERGENCY MEDICINE
Payer: COMMERCIAL

## 2023-02-12 VITALS
SYSTOLIC BLOOD PRESSURE: 135 MMHG | OXYGEN SATURATION: 97 % | WEIGHT: 255 LBS | HEART RATE: 90 BPM | DIASTOLIC BLOOD PRESSURE: 76 MMHG | RESPIRATION RATE: 18 BRPM | TEMPERATURE: 98.3 F | BODY MASS INDEX: 40.98 KG/M2 | HEIGHT: 66 IN

## 2023-02-12 DIAGNOSIS — N39.0 ACUTE UTI: Primary | ICD-10-CM

## 2023-02-12 DIAGNOSIS — R11.2 NAUSEA VOMITING AND DIARRHEA: ICD-10-CM

## 2023-02-12 DIAGNOSIS — R19.7 NAUSEA VOMITING AND DIARRHEA: ICD-10-CM

## 2023-02-12 LAB
ALBUMIN SERPL-MCNC: 4.4 G/DL (ref 3.5–5.2)
ALBUMIN/GLOB SERPL: 1.5 G/DL
ALP SERPL-CCNC: 68 U/L (ref 39–117)
ALT SERPL W P-5'-P-CCNC: 51 U/L (ref 1–33)
ANION GAP SERPL CALCULATED.3IONS-SCNC: 11 MMOL/L (ref 5–15)
AST SERPL-CCNC: 39 U/L (ref 1–32)
B-HCG UR QL: NEGATIVE
BACTERIA UR QL AUTO: ABNORMAL /HPF
BASOPHILS # BLD AUTO: 0.04 10*3/MM3 (ref 0–0.2)
BASOPHILS NFR BLD AUTO: 0.3 % (ref 0–1.5)
BILIRUB SERPL-MCNC: 0.7 MG/DL (ref 0–1.2)
BILIRUB UR QL STRIP: NEGATIVE
BUN SERPL-MCNC: 19 MG/DL (ref 6–20)
BUN/CREAT SERPL: 26.8 (ref 7–25)
CALCIUM SPEC-SCNC: 9.3 MG/DL (ref 8.6–10.5)
CHLORIDE SERPL-SCNC: 106 MMOL/L (ref 98–107)
CLARITY UR: ABNORMAL
CO2 SERPL-SCNC: 23 MMOL/L (ref 22–29)
COLOR UR: YELLOW
CREAT SERPL-MCNC: 0.71 MG/DL (ref 0.57–1)
DEPRECATED RDW RBC AUTO: 37.5 FL (ref 37–54)
EGFRCR SERPLBLD CKD-EPI 2021: 120.4 ML/MIN/1.73
EOSINOPHIL # BLD AUTO: 0.1 10*3/MM3 (ref 0–0.4)
EOSINOPHIL NFR BLD AUTO: 0.8 % (ref 0.3–6.2)
ERYTHROCYTE [DISTWIDTH] IN BLOOD BY AUTOMATED COUNT: 11.9 % (ref 12.3–15.4)
FLUAV RNA RESP QL NAA+PROBE: NOT DETECTED
FLUBV RNA RESP QL NAA+PROBE: NOT DETECTED
GLOBULIN UR ELPH-MCNC: 3 GM/DL
GLUCOSE SERPL-MCNC: 99 MG/DL (ref 65–99)
GLUCOSE UR STRIP-MCNC: NEGATIVE MG/DL
HCT VFR BLD AUTO: 45.2 % (ref 34–46.6)
HGB BLD-MCNC: 15.7 G/DL (ref 12–15.9)
HGB UR QL STRIP.AUTO: ABNORMAL
HYALINE CASTS UR QL AUTO: ABNORMAL /LPF
IMM GRANULOCYTES # BLD AUTO: 0.04 10*3/MM3 (ref 0–0.05)
IMM GRANULOCYTES NFR BLD AUTO: 0.3 % (ref 0–0.5)
KETONES UR QL STRIP: NEGATIVE
LEUKOCYTE ESTERASE UR QL STRIP.AUTO: NEGATIVE
LIPASE SERPL-CCNC: 19 U/L (ref 13–60)
LYMPHOCYTES # BLD AUTO: 1.88 10*3/MM3 (ref 0.7–3.1)
LYMPHOCYTES NFR BLD AUTO: 14.1 % (ref 19.6–45.3)
MCH RBC QN AUTO: 30.1 PG (ref 26.6–33)
MCHC RBC AUTO-ENTMCNC: 34.7 G/DL (ref 31.5–35.7)
MCV RBC AUTO: 86.8 FL (ref 79–97)
MONOCYTES # BLD AUTO: 0.63 10*3/MM3 (ref 0.1–0.9)
MONOCYTES NFR BLD AUTO: 4.7 % (ref 5–12)
MUCOUS THREADS URNS QL MICRO: ABNORMAL /HPF
NEUTROPHILS NFR BLD AUTO: 10.62 10*3/MM3 (ref 1.7–7)
NEUTROPHILS NFR BLD AUTO: 79.8 % (ref 42.7–76)
NITRITE UR QL STRIP: NEGATIVE
NRBC BLD AUTO-RTO: 0 /100 WBC (ref 0–0.2)
PH UR STRIP.AUTO: 5.5 [PH] (ref 5–8)
PLATELET # BLD AUTO: 297 10*3/MM3 (ref 140–450)
PMV BLD AUTO: 10.4 FL (ref 6–12)
POTASSIUM SERPL-SCNC: 3.8 MMOL/L (ref 3.5–5.2)
PROT SERPL-MCNC: 7.4 G/DL (ref 6–8.5)
PROT UR QL STRIP: ABNORMAL
RBC # BLD AUTO: 5.21 10*6/MM3 (ref 3.77–5.28)
RBC # UR STRIP: ABNORMAL /HPF
REF LAB TEST METHOD: ABNORMAL
S PYO AG THROAT QL: NEGATIVE
SARS-COV-2 RNA RESP QL NAA+PROBE: NOT DETECTED
SODIUM SERPL-SCNC: 140 MMOL/L (ref 136–145)
SP GR UR STRIP: 1.03 (ref 1–1.03)
SQUAMOUS #/AREA URNS HPF: ABNORMAL /HPF
UROBILINOGEN UR QL STRIP: ABNORMAL
WBC # UR STRIP: ABNORMAL /HPF
WBC NRBC COR # BLD: 13.31 10*3/MM3 (ref 3.4–10.8)

## 2023-02-12 PROCEDURE — 25010000002 KETOROLAC TROMETHAMINE PER 15 MG: Performed by: PHYSICIAN ASSISTANT

## 2023-02-12 PROCEDURE — 87880 STREP A ASSAY W/OPTIC: CPT | Performed by: PHYSICIAN ASSISTANT

## 2023-02-12 PROCEDURE — 25010000002 CEFTRIAXONE SODIUM-DEXTROSE 1-3.74 GM-%(50ML) RECONSTITUTED SOLUTION: Performed by: PHYSICIAN ASSISTANT

## 2023-02-12 PROCEDURE — 25010000002 DIPHENHYDRAMINE PER 50 MG: Performed by: PHYSICIAN ASSISTANT

## 2023-02-12 PROCEDURE — 96365 THER/PROPH/DIAG IV INF INIT: CPT

## 2023-02-12 PROCEDURE — 83690 ASSAY OF LIPASE: CPT | Performed by: PHYSICIAN ASSISTANT

## 2023-02-12 PROCEDURE — 99283 EMERGENCY DEPT VISIT LOW MDM: CPT

## 2023-02-12 PROCEDURE — 81001 URINALYSIS AUTO W/SCOPE: CPT | Performed by: PHYSICIAN ASSISTANT

## 2023-02-12 PROCEDURE — 80053 COMPREHEN METABOLIC PANEL: CPT | Performed by: PHYSICIAN ASSISTANT

## 2023-02-12 PROCEDURE — 25010000002 METOCLOPRAMIDE PER 10 MG: Performed by: PHYSICIAN ASSISTANT

## 2023-02-12 PROCEDURE — 96375 TX/PRO/DX INJ NEW DRUG ADDON: CPT

## 2023-02-12 PROCEDURE — 87086 URINE CULTURE/COLONY COUNT: CPT | Performed by: PHYSICIAN ASSISTANT

## 2023-02-12 PROCEDURE — 87081 CULTURE SCREEN ONLY: CPT | Performed by: PHYSICIAN ASSISTANT

## 2023-02-12 PROCEDURE — 87636 SARSCOV2 & INF A&B AMP PRB: CPT | Performed by: PHYSICIAN ASSISTANT

## 2023-02-12 PROCEDURE — 85025 COMPLETE CBC W/AUTO DIFF WBC: CPT | Performed by: PHYSICIAN ASSISTANT

## 2023-02-12 PROCEDURE — 81025 URINE PREGNANCY TEST: CPT | Performed by: PHYSICIAN ASSISTANT

## 2023-02-12 RX ORDER — KETOROLAC TROMETHAMINE 30 MG/ML
15 INJECTION, SOLUTION INTRAMUSCULAR; INTRAVENOUS ONCE
Status: COMPLETED | OUTPATIENT
Start: 2023-02-12 | End: 2023-02-12

## 2023-02-12 RX ORDER — CEFTRIAXONE 1 G/50ML
1 INJECTION, SOLUTION INTRAVENOUS ONCE
Status: COMPLETED | OUTPATIENT
Start: 2023-02-12 | End: 2023-02-12

## 2023-02-12 RX ORDER — CEFDINIR 300 MG/1
300 CAPSULE ORAL 2 TIMES DAILY
Qty: 14 CAPSULE | Refills: 0 | Status: SHIPPED | OUTPATIENT
Start: 2023-02-12 | End: 2023-02-19

## 2023-02-12 RX ORDER — DIPHENHYDRAMINE HYDROCHLORIDE 50 MG/ML
25 INJECTION INTRAMUSCULAR; INTRAVENOUS ONCE
Status: COMPLETED | OUTPATIENT
Start: 2023-02-12 | End: 2023-02-12

## 2023-02-12 RX ORDER — ONDANSETRON 4 MG/1
4 TABLET, ORALLY DISINTEGRATING ORAL EVERY 8 HOURS PRN
Qty: 12 TABLET | Refills: 0 | Status: SHIPPED | OUTPATIENT
Start: 2023-02-12

## 2023-02-12 RX ORDER — METOCLOPRAMIDE HYDROCHLORIDE 5 MG/ML
10 INJECTION INTRAMUSCULAR; INTRAVENOUS ONCE
Status: COMPLETED | OUTPATIENT
Start: 2023-02-12 | End: 2023-02-12

## 2023-02-12 RX ADMIN — SODIUM CHLORIDE 1000 ML: 9 INJECTION, SOLUTION INTRAVENOUS at 19:16

## 2023-02-12 RX ADMIN — METOCLOPRAMIDE 10 MG: 5 INJECTION, SOLUTION INTRAMUSCULAR; INTRAVENOUS at 19:11

## 2023-02-12 RX ADMIN — KETOROLAC TROMETHAMINE 15 MG: 30 INJECTION, SOLUTION INTRAMUSCULAR; INTRAVENOUS at 19:09

## 2023-02-12 RX ADMIN — CEFTRIAXONE 1 G: 1 INJECTION, SOLUTION INTRAVENOUS at 21:08

## 2023-02-12 RX ADMIN — DIPHENHYDRAMINE HYDROCHLORIDE 25 MG: 50 INJECTION, SOLUTION INTRAMUSCULAR; INTRAVENOUS at 19:08

## 2023-02-12 NOTE — ED PROVIDER NOTES
Subjective   History of Present Illness   Patient is a 27-year-old female with history of anxiety, arthritis, asthma, depression, gallstones, GERD, PCOS, among others presenting to the ER with complaints of nausea, vomiting, diarrhea x1 day.  She states symptoms started around 9 AM this morning.  She states she has been unable to hold anything down.  She states that she tried to take Pepto bismol and small pink pills that she got over-the-counter for nausea.  She states that these have not helped.  She states that her throat is hurting from vomiting so much.  She states that she is having diffuse crampy abdominal pain but denies any significant focal abdominal pain.  She denies additional symptoms or complaints at this time.  Denies chance of pregnancy.    Review of Systems   Gastrointestinal: Positive for abdominal pain, diarrhea, nausea and vomiting.   All other systems reviewed and are negative.      Past Medical History:   Diagnosis Date   • Anxiety    • Arthritis    • Asthma     no rescue inhaler   • Depression    • Gallstones    • GERD (gastroesophageal reflux disease)    • PCOS (polycystic ovarian syndrome)    • PONV (postoperative nausea and vomiting)    • Seasonal allergies    • Tattoo     x2   • Urogenital trichomoniasis     Treated last year   • UTI (urinary tract infection)        Allergies   Allergen Reactions   • Dilaudid [Hydromorphone] Nausea And Vomiting       Past Surgical History:   Procedure Laterality Date   • BILE DUCT STENT PLACEMENT  03/2020   • DENTAL PROCEDURE      Age 4   • DIAGNOSTIC LAPAROSCOPY N/A 4/2/2021    Procedure: DIAGNOSTIC LAPAROSCOPY;  Surgeon: Kevlin Hinton MD;  Location: [facility];  Service: Obstetrics/Gynecology;  Laterality: N/A;   • ENDOSCOPY     • LAPAROSCOPIC CHOLECYSTECTOMY  03/2020   • TUBAL COAGULATION LAPAROSCOPIC N/A 3/25/2022    Procedure: LAPAROSCOPIC BILATERAL TUBAL LIGATION, NEXPLANON REMOVAL. IUD INSERTION;  Surgeon: Kelvin Hinton MD;  Location:   [facility];  Service: Obstetrics/Gynecology;  Laterality: N/A;       Family History   Adopted: Yes   Problem Relation Age of Onset   • Heart murmur Other        Social History     Socioeconomic History   • Marital status: Single   Tobacco Use   • Smoking status: Former     Packs/day: 0.25     Years: 2.00     Pack years: 0.50     Types: Cigarettes   • Smokeless tobacco: Never   • Tobacco comments:     1 pack per week   Vaping Use   • Vaping Use: Former   • Substances: THC   • Devices: Pre-filled or refillable cartridge   Substance and Sexual Activity   • Alcohol use: Never   • Drug use: Yes     Types: Marijuana     Comment: occasional   • Sexual activity: Defer     Comment: nexplanon           Objective   Physical Exam  Vitals and nursing note reviewed.   Constitutional:       General: She is not in acute distress.     Appearance: She is not toxic-appearing.   HENT:      Head: Normocephalic and atraumatic.      Right Ear: External ear normal.      Left Ear: External ear normal.      Nose: Nose normal.   Eyes:      Extraocular Movements: Extraocular movements intact.      Conjunctiva/sclera: Conjunctivae normal.   Cardiovascular:      Rate and Rhythm: Normal rate.   Pulmonary:      Effort: Pulmonary effort is normal. No respiratory distress.   Abdominal:      General: There is no distension.      Palpations: Abdomen is soft.      Tenderness: There is abdominal tenderness. There is no guarding or rebound.   Musculoskeletal:         General: Normal range of motion.      Cervical back: Normal range of motion and neck supple.   Skin:     General: Skin is warm and dry.   Neurological:      General: No focal deficit present.      Mental Status: She is alert and oriented to person, place, and time.   Psychiatric:         Mood and Affect: Mood normal.         Behavior: Behavior normal.         Procedures           ED Course      Lab Results (last 24 hours)     Procedure Component Value Units Date/Time    COVID-19 and FLU A/B  PCR - Swab, Nasopharynx [[id number]]  (Normal) Collected: 02/12/23 1838    Specimen: Swab from Nasopharynx Updated: 02/12/23 1902     COVID19 Not Detected     Influenza A PCR Not Detected     Influenza B PCR Not Detected    Narrative:      Fact sheet for providers: https://www.fda.gov/media/565164/download    Fact sheet for patients: https://www.fda.gov/media/899575/download    Test performed by PCR.    Rapid Strep A Screen - Swab, Throat [[id number]]  (Normal) Collected: 02/12/23 1838    Specimen: Swab from Throat Updated: 02/12/23 1854     Strep A Ag Negative    Beta Strep Culture, Throat - Swab, Throat [[id number]] Collected: 02/12/23 1838    Specimen: Swab from Throat Updated: 02/12/23 1853    CBC & Differential [[id number]]  (Abnormal) Collected: 02/12/23 1906    Specimen: Blood Updated: 02/12/23 1925    Narrative:      The following orders were created for panel order CBC & Differential.  Procedure                               Abnormality         Status                     ---------                               -----------         ------                     CBC Auto Differential[[id number]]        Abnormal            Final result               Scan Slide[[id number]]                                                                    Please view results for these tests on the individual orders.    Comprehensive Metabolic Panel [[id number]]  (Abnormal) Collected: 02/12/23 1906    Specimen: Blood Updated: 02/12/23 1938     Glucose 99 mg/dL      BUN 19 mg/dL      Creatinine 0.71 mg/dL      Sodium 140 mmol/L      Potassium 3.8 mmol/L      Comment: Slight hemolysis detected by analyzer. Results may be affected.        Chloride 106 mmol/L      CO2 23.0 mmol/L      Calcium 9.3 mg/dL      Total Protein 7.4 g/dL      Albumin 4.4 g/dL      ALT (SGPT) 51 U/L      AST (SGOT) 39 U/L      Alkaline Phosphatase 68 U/L      Total Bilirubin 0.7 mg/dL      Globulin 3.0 gm/dL      A/G Ratio 1.5 g/dL      BUN/Creatinine Ratio 26.8      Anion Gap 11.0 mmol/L      eGFR 120.4 mL/min/1.73     Narrative:      GFR Normal >60  Chronic Kidney Disease <60  Kidney Failure <15      Lipase [[id number]]  (Normal) Collected: 02/12/23 1906    Specimen: Blood Updated: 02/12/23 1938     Lipase 19 U/L     CBC Auto Differential [[id number]]  (Abnormal) Collected: 02/12/23 1906    Specimen: Blood Updated: 02/12/23 1925     WBC 13.31 10*3/mm3      RBC 5.21 10*6/mm3      Hemoglobin 15.7 g/dL      Hematocrit 45.2 %      MCV 86.8 fL      MCH 30.1 pg      MCHC 34.7 g/dL      RDW 11.9 %      RDW-SD 37.5 fl      MPV 10.4 fL      Platelets 297 10*3/mm3      Neutrophil % 79.8 %      Lymphocyte % 14.1 %      Monocyte % 4.7 %      Eosinophil % 0.8 %      Basophil % 0.3 %      Immature Grans % 0.3 %      Neutrophils, Absolute 10.62 10*3/mm3      Lymphocytes, Absolute 1.88 10*3/mm3      Monocytes, Absolute 0.63 10*3/mm3      Eosinophils, Absolute 0.10 10*3/mm3      Basophils, Absolute 0.04 10*3/mm3      Immature Grans, Absolute 0.04 10*3/mm3      nRBC 0.0 /100 WBC     Pregnancy, Urine - Urine, Clean Catch [[id number]]  (Normal) Collected: 02/12/23 1951    Specimen: Urine, Clean Catch Updated: 02/12/23 2002     HCG, Urine QL Negative    Urinalysis With Microscopic If Indicated (No Culture) - Urine, Clean Catch [[id number]]  (Abnormal) Collected: 02/12/23 1951    Specimen: Urine, Clean Catch Updated: 02/12/23 2000     Color, UA Yellow     Appearance, UA Cloudy     pH, UA 5.5     Specific Gravity, UA 1.028     Glucose, UA Negative     Ketones, UA Negative     Bilirubin, UA Negative     Blood, UA Small (1+)     Protein,  mg/dL (2+)     Leuk Esterase, UA Negative     Nitrite, UA Negative     Urobilinogen, UA 1.0 E.U./dL    Urinalysis, Microscopic Only - Urine, Clean Catch [[id number]]  (Abnormal) Collected: 02/12/23 1951    Specimen: Urine, Clean Catch Updated: 02/12/23 2020     RBC, UA None Seen /HPF      WBC, UA None Seen /HPF      Bacteria, UA 4+ /HPF      Squamous Epithelial  "Cells, UA None Seen /HPF      Hyaline Casts, UA None Seen /LPF      Mucus, UA Trace /HPF      Methodology Manual Light Microscopy    Urine Culture - Urine, Urine, Clean Catch [[id number]] Collected: 02/12/23 1951    Specimen: Urine, Clean Catch Updated: 02/12/23 2023                                   /76 (BP Location: Left arm, Patient Position: Lying)   Pulse 90   Temp 98.3 °F (36.8 °C) (Oral)   Resp 18   Ht 167.6 cm (66\")   Wt 116 kg (255 lb)   SpO2 97%   BMI 41.16 kg/m²             MDM   Patient was evaluated in the ER for nausea, vomiting, diarrhea x1 day.  Patient is hemodynamically stable, afebrile, nontoxic-appearing on exam.  Differential diagnosis includes but is not limited to viral gastroenteritis, COVID, flu, strep pharyngitis, electrolyte abnormalities, dehydration, among others.  Initial plan includes CBC, CMP, lipase, COVID/flu swab, strep swab, urinalysis, urine pregnancy test, Reglan, Benadryl, IV fluids, and Toradol.    COVID, flu, strep are negative.  Labs are remarkable for leukocytosis and very mild transaminitis but otherwise nonactionable.  Urinalysis revealed negative leukocytes and nitrites but microscopic urinalysis did reveal 4+ bacteria.  Urine culture pending.  Patient was given 1 g IV Rocephin.  Upon reevaluation, patient states that she feels like a whole new person.  She is ready for discharge.  Prescription was provided for Zofran and Cefdinir.  Patient advised to follow-up with her PCP for further outpatient evaluation if symptoms persist. Precautions were given for return to the ER for any new or worsening symptoms.    Final diagnoses:   Acute UTI   Nausea vomiting and diarrhea       ED Disposition  ED Disposition     ED Disposition   Discharge    Condition   Stable    Comment   --             Provider, No Known  [facility] [address] [phone]    Schedule an appointment as soon as possible for a visit   for further outpatient evaluation if " symptoms persist    [facility]  [address] [facility] [id number] [phone]  Go to   As needed, If symptoms worsen         Medication List      New Prescriptions    cefdinir 300 MG capsule  Commonly known as: OMNICEF  Take 1 capsule by mouth 2 (Two) Times a Day for 7 days.     ondansetron ODT 4 MG disintegrating tablet  Commonly known as: ZOFRAN-ODT  Place 1 tablet on the tongue Every 8 (Eight) Hours As Needed for Nausea or Vomiting.           Where to Get Your Medications      These medications were sent to [facility] [address] [place] [address] - [address] [facility] - [phone]  - [phone]  [address] [facility] [address]    Phone: [phone]   · cefdinir 300 MG capsule  · ondansetron ODT 4 MG disintegrating tablet          Lindsay Lazar PA-C  02/12/23 4439

## 2023-02-13 LAB — BACTERIA SPEC AEROBE CULT: NO GROWTH

## 2023-02-13 NOTE — DISCHARGE INSTRUCTIONS
Take antibiotic as prescribed.  Take Zofran as needed as prescribed for nausea and vomiting.  Drink plenty of fluids to stay hydrated.  Follow-up with your PCP for further outpatient evaluation if symptoms persist.  Return to the ER for new or worsening symptoms or acute concerns.

## 2023-02-14 LAB — BACTERIA SPEC AEROBE CULT: NORMAL

## 2023-03-17 ENCOUNTER — OFFICE VISIT (OUTPATIENT)
Dept: INTERNAL MEDICINE | Facility: CLINIC | Age: 28
End: 2023-03-17
Payer: COMMERCIAL

## 2023-03-17 VITALS
HEART RATE: 71 BPM | TEMPERATURE: 97.4 F | HEIGHT: 67 IN | DIASTOLIC BLOOD PRESSURE: 78 MMHG | WEIGHT: 255.12 LBS | SYSTOLIC BLOOD PRESSURE: 116 MMHG | OXYGEN SATURATION: 96 % | RESPIRATION RATE: 16 BRPM | BODY MASS INDEX: 40.04 KG/M2

## 2023-03-17 DIAGNOSIS — M54.2 NECK PAIN: Primary | ICD-10-CM

## 2023-03-17 PROCEDURE — 99203 OFFICE O/P NEW LOW 30 MIN: CPT | Performed by: INTERNAL MEDICINE

## 2023-03-17 PROCEDURE — 1160F RVW MEDS BY RX/DR IN RCRD: CPT | Performed by: INTERNAL MEDICINE

## 2023-03-17 PROCEDURE — 1159F MED LIST DOCD IN RCRD: CPT | Performed by: INTERNAL MEDICINE

## 2023-03-17 RX ORDER — HYDROCODONE BITARTRATE AND ACETAMINOPHEN 5; 325 MG/1; MG/1
1 TABLET ORAL NIGHTLY PRN
Qty: 10 TABLET | Refills: 0 | Status: SHIPPED | OUTPATIENT
Start: 2023-03-17 | End: 2023-03-23 | Stop reason: SDUPTHER
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
## 2023-03-23 DIAGNOSIS — M54.2 NECK PAIN: ICD-10-CM

## 2023-03-23 RX ORDER — HYDROCODONE BITARTRATE AND ACETAMINOPHEN 5; 325 MG/1; MG/1
1 TABLET ORAL NIGHTLY PRN
Qty: 10 TABLET | Refills: 0 | Status: SHIPPED | OUTPATIENT
Start: 2023-03-23 | End: 2023-03-24 | Stop reason: SDUPTHER

## 2023-03-24 DIAGNOSIS — M54.2 NECK PAIN: ICD-10-CM

## 2023-03-24 RX ORDER — HYDROCODONE BITARTRATE AND ACETAMINOPHEN 5; 325 MG/1; MG/1
1 TABLET ORAL NIGHTLY PRN
Qty: 10 TABLET | Refills: 0 | Status: SHIPPED | OUTPATIENT
Start: 2023-03-24

## 2023-03-24 NOTE — TELEPHONE ENCOUNTER
Caller: Nery Ibarra    Relationship to patient: Self    Best call back number: [phone]    Patient is needing: PATIENT STATED THAT SHE WOULD NEED THE HYDROcodone-acetaminophen (NORCO) 5-325 MG per tablet [facility]UE TO [facility]ANCE ISSUES    PLEASE ADVISE ASAP

## 2023-03-27 RX ORDER — HYDROCODONE BITARTRATE AND ACETAMINOPHEN 5; 325 MG/1; MG/1
1 TABLET ORAL NIGHTLY PRN
Qty: 10 TABLET | Refills: 0 | OUTPATIENT
Start: 2023-03-27

## 2023-03-27 NOTE — TELEPHONE ENCOUNTER
Rx Refill Note  Requested Prescriptions     Pending Prescriptions Disp Refills   • HYDROcodone-acetaminophen (NORCO) 5-325 MG per tablet 10 tablet 0     Sig: Take 1 tablet by mouth At Night As Needed for Severe Pain.     Signed Prescriptions Disp Refills   • HYDROcodone-acetaminophen (NORCO) 5-325 MG per tablet 10 tablet 0     Sig: Take 1 tablet by mouth At Night As Needed for Severe Pain.     Authorizing Provider: CHARLOTTE LIAO      Last office visit with prescribing clinician: 3/17/2023   Last telemedicine visit with prescribing clinician: 4/28/2023   Next office visit with prescribing clinician: 4/28/2023                         Would you like a call back once the refill request has been completed: [] Yes [] No    If the office needs to give you a call back, can they leave a voicemail: [] Yes [] No    Supriya Rodriguez LPN  03/27/23, 08:22 EDT

## 2023-04-17 ENCOUNTER — TELEPHONE (OUTPATIENT)
Dept: INTERNAL MEDICINE | Facility: CLINIC | Age: 28
End: 2023-04-17
Payer: COMMERCIAL

## 2023-04-17 NOTE — TELEPHONE ENCOUNTER
Patient called and stated she is upset that she has been waiting for MRI for several weeks and was denied the day before appointment and was not given any other options. Patient states she is still having pain and wants an MRI to see about pinched nerves. Patient would like a call to discuss next steps. Phone number verified.

## 2023-04-17 NOTE — TELEPHONE ENCOUNTER
Pts MRI was denied. A peer to peer option is available. Call [phone]. Please call Alessandra to let her know how to proceed. [phone]

## 2023-04-19 ENCOUNTER — TELEPHONE (OUTPATIENT)
Dept: INTERNAL MEDICINE | Facility: CLINIC | Age: 28
End: 2023-04-19
Payer: COMMERCIAL

## 2023-04-19 NOTE — TELEPHONE ENCOUNTER
Caller: Nery Ibarra     Relationship: SELF    Best call back number: [phone]    What is your medical concern? PAIN IN RIGHT SHOULDER - INJURY    How long has this issue been going on? SOME TIME    Is your provider already aware of this issue? YES    Have you been treated for this issue? YES HOWEVER THE MRI HE WANTED TO DO WAS DENIED. WHAT IS THE NEXT STEP AS SHE STATES SHE IS STILL  IN PAIN.

## 2023-06-14 ENCOUNTER — HOSPITAL ENCOUNTER (EMERGENCY)
Facility: HOSPITAL | Age: 28
Discharge: OTHER INSTITUTION | End: 2023-06-15
Attending: EMERGENCY MEDICINE
Payer: COMMERCIAL

## 2023-06-14 VITALS
BODY MASS INDEX: 40.18 KG/M2 | RESPIRATION RATE: 18 BRPM | OXYGEN SATURATION: 97 % | HEIGHT: 66 IN | TEMPERATURE: 98.4 F | DIASTOLIC BLOOD PRESSURE: 73 MMHG | HEART RATE: 76 BPM | WEIGHT: 250 LBS | SYSTOLIC BLOOD PRESSURE: 111 MMHG

## 2023-06-14 DIAGNOSIS — Z72.89 DELIBERATE SELF-CUTTING: ICD-10-CM

## 2023-06-14 DIAGNOSIS — R45.851 SUICIDAL IDEATION: Primary | ICD-10-CM

## 2023-06-14 LAB
ALBUMIN SERPL-MCNC: 4.5 G/DL (ref 3.5–5.2)
ALBUMIN/GLOB SERPL: 1.7 G/DL
ALP SERPL-CCNC: 61 U/L (ref 39–117)
ALT SERPL W P-5'-P-CCNC: 40 U/L (ref 1–33)
AMPHET+METHAMPHET UR QL: NEGATIVE
AMPHETAMINES UR QL: NEGATIVE
ANION GAP SERPL CALCULATED.3IONS-SCNC: 11.3 MMOL/L (ref 5–15)
APAP SERPL-MCNC: <5 MCG/ML (ref 0–30)
AST SERPL-CCNC: 40 U/L (ref 1–32)
B-HCG UR QL: NEGATIVE
BACTERIA UR QL AUTO: ABNORMAL /HPF
BARBITURATES UR QL SCN: NEGATIVE
BASOPHILS # BLD AUTO: 0.05 10*3/MM3 (ref 0–0.2)
BASOPHILS NFR BLD AUTO: 0.6 % (ref 0–1.5)
BENZODIAZ UR QL SCN: NEGATIVE
BILIRUB SERPL-MCNC: 0.5 MG/DL (ref 0–1.2)
BILIRUB UR QL STRIP: NEGATIVE
BUN SERPL-MCNC: 20 MG/DL (ref 6–20)
BUN/CREAT SERPL: 27 (ref 7–25)
BUPRENORPHINE SERPL-MCNC: NEGATIVE NG/ML
CALCIUM SPEC-SCNC: 9.3 MG/DL (ref 8.6–10.5)
CANNABINOIDS SERPL QL: POSITIVE
CHLORIDE SERPL-SCNC: 105 MMOL/L (ref 98–107)
CLARITY UR: ABNORMAL
CO2 SERPL-SCNC: 21.7 MMOL/L (ref 22–29)
COCAINE UR QL: NEGATIVE
COLOR UR: ABNORMAL
CREAT SERPL-MCNC: 0.74 MG/DL (ref 0.57–1)
DEPRECATED RDW RBC AUTO: 36.3 FL (ref 37–54)
EGFRCR SERPLBLD CKD-EPI 2021: 114.6 ML/MIN/1.73
EOSINOPHIL # BLD AUTO: 0.27 10*3/MM3 (ref 0–0.4)
EOSINOPHIL NFR BLD AUTO: 3.4 % (ref 0.3–6.2)
ERYTHROCYTE [DISTWIDTH] IN BLOOD BY AUTOMATED COUNT: 12.1 % (ref 12.3–15.4)
ETHANOL BLD-MCNC: <10 MG/DL (ref 0–10)
ETHANOL UR QL: <0.01 %
GLOBULIN UR ELPH-MCNC: 2.7 GM/DL
GLUCOSE SERPL-MCNC: 129 MG/DL (ref 65–99)
GLUCOSE UR STRIP-MCNC: NEGATIVE MG/DL
HCT VFR BLD AUTO: 41.7 % (ref 34–46.6)
HGB BLD-MCNC: 15 G/DL (ref 12–15.9)
HGB UR QL STRIP.AUTO: NEGATIVE
HOLD SPECIMEN: NORMAL
HOLD SPECIMEN: NORMAL
HYALINE CASTS UR QL AUTO: ABNORMAL /LPF
IMM GRANULOCYTES # BLD AUTO: 0.02 10*3/MM3 (ref 0–0.05)
IMM GRANULOCYTES NFR BLD AUTO: 0.3 % (ref 0–0.5)
KETONES UR QL STRIP: ABNORMAL
LEUKOCYTE ESTERASE UR QL STRIP.AUTO: ABNORMAL
LYMPHOCYTES # BLD AUTO: 2.81 10*3/MM3 (ref 0.7–3.1)
LYMPHOCYTES NFR BLD AUTO: 35.2 % (ref 19.6–45.3)
MCH RBC QN AUTO: 29.9 PG (ref 26.6–33)
MCHC RBC AUTO-ENTMCNC: 36 G/DL (ref 31.5–35.7)
MCV RBC AUTO: 83.1 FL (ref 79–97)
METHADONE UR QL SCN: NEGATIVE
MONOCYTES # BLD AUTO: 0.44 10*3/MM3 (ref 0.1–0.9)
MONOCYTES NFR BLD AUTO: 5.5 % (ref 5–12)
NEUTROPHILS NFR BLD AUTO: 4.4 10*3/MM3 (ref 1.7–7)
NEUTROPHILS NFR BLD AUTO: 55 % (ref 42.7–76)
NITRITE UR QL STRIP: NEGATIVE
NRBC BLD AUTO-RTO: 0 /100 WBC (ref 0–0.2)
OPIATES UR QL: NEGATIVE
OXYCODONE UR QL SCN: NEGATIVE
PCP UR QL SCN: NEGATIVE
PH UR STRIP.AUTO: 6 [PH] (ref 5–8)
PLATELET # BLD AUTO: 277 10*3/MM3 (ref 140–450)
PMV BLD AUTO: 10.4 FL (ref 6–12)
POTASSIUM SERPL-SCNC: 3.5 MMOL/L (ref 3.5–5.2)
PROPOXYPH UR QL: NEGATIVE
PROT SERPL-MCNC: 7.2 G/DL (ref 6–8.5)
PROT UR QL STRIP: ABNORMAL
RBC # BLD AUTO: 5.02 10*6/MM3 (ref 3.77–5.28)
RBC # UR STRIP: ABNORMAL /HPF
REF LAB TEST METHOD: ABNORMAL
SALICYLATES SERPL-MCNC: <0.3 MG/DL
SARS-COV-2 RNA RESP QL NAA+PROBE: NOT DETECTED
SODIUM SERPL-SCNC: 138 MMOL/L (ref 136–145)
SP GR UR STRIP: >=1.03 (ref 1–1.03)
SQUAMOUS #/AREA URNS HPF: ABNORMAL /HPF
TRICYCLICS UR QL SCN: NEGATIVE
UROBILINOGEN UR QL STRIP: ABNORMAL
WBC # UR STRIP: ABNORMAL /HPF
WBC NRBC COR # BLD: 7.99 10*3/MM3 (ref 3.4–10.8)
WHOLE BLOOD HOLD COAG: NORMAL
WHOLE BLOOD HOLD SPECIMEN: NORMAL

## 2023-06-14 PROCEDURE — 93005 ELECTROCARDIOGRAM TRACING: CPT | Performed by: EMERGENCY MEDICINE

## 2023-06-14 PROCEDURE — 81001 URINALYSIS AUTO W/SCOPE: CPT | Performed by: EMERGENCY MEDICINE

## 2023-06-14 PROCEDURE — 80143 DRUG ASSAY ACETAMINOPHEN: CPT | Performed by: EMERGENCY MEDICINE

## 2023-06-14 PROCEDURE — 80179 DRUG ASSAY SALICYLATE: CPT | Performed by: EMERGENCY MEDICINE

## 2023-06-14 PROCEDURE — 87635 SARS-COV-2 COVID-19 AMP PRB: CPT | Performed by: EMERGENCY MEDICINE

## 2023-06-14 PROCEDURE — 81025 URINE PREGNANCY TEST: CPT | Performed by: EMERGENCY MEDICINE

## 2023-06-14 PROCEDURE — 85025 COMPLETE CBC W/AUTO DIFF WBC: CPT | Performed by: EMERGENCY MEDICINE

## 2023-06-14 PROCEDURE — 80306 DRUG TEST PRSMV INSTRMNT: CPT | Performed by: EMERGENCY MEDICINE

## 2023-06-14 PROCEDURE — 80053 COMPREHEN METABOLIC PANEL: CPT | Performed by: EMERGENCY MEDICINE

## 2023-06-14 PROCEDURE — 82077 ASSAY SPEC XCP UR&BREATH IA: CPT | Performed by: EMERGENCY MEDICINE

## 2023-06-14 RX ORDER — BACITRACIN ZINC 500 [USP'U]/G
1 OINTMENT TOPICAL ONCE
Status: COMPLETED | OUTPATIENT
Start: 2023-06-14 | End: 2023-06-14

## 2023-06-14 RX ORDER — IBUPROFEN 200 MG
400 TABLET ORAL ONCE
Status: COMPLETED | OUTPATIENT
Start: 2023-06-14 | End: 2023-06-14

## 2023-06-14 RX ADMIN — BACITRACIN ZINC 1 APPLICATION: 500 OINTMENT TOPICAL at 19:31

## 2023-06-14 RX ADMIN — IBUPROFEN 400 MG: 200 TABLET, FILM COATED ORAL at 22:10

## 2023-06-14 NOTE — ED PROVIDER NOTES
Subjective  History of Present Illness:    Chief Complaint: Suicidal ideation self-inflicted abrasions left arm    History of Present Illness: 27-year-old female presents with suicidal ideations and self-inflicted abrasions to left arm.    Nurses Notes reviewed and agree, including vitals, allergies, social history and prior medical history.     REVIEW OF SYSTEMS: All systems reviewed and not pertinent unless noted.  Review of Systems      Positive for: Suicidal ideation with intent to cut herself    Negative for: Hallucinations homicidal ideation    Past Medical History:   Diagnosis Date   • Anxiety    • Arthritis    • Asthma     no rescue inhaler   • Depression    • Gallstones    • GERD (gastroesophageal reflux disease)    • PCOS (polycystic ovarian syndrome)    • PONV (postoperative nausea and vomiting)    • Seasonal allergies    • Tattoo     x2   • Urogenital trichomoniasis     Treated last year   • UTI (urinary tract infection)        Allergies:    Dilaudid [hydromorphone]      Past Surgical History:   Procedure Laterality Date   • BILE DUCT STENT PLACEMENT  03/2020   • DENTAL PROCEDURE      Age 4   • DIAGNOSTIC LAPAROSCOPY N/A 4/2/2021    Procedure: DIAGNOSTIC LAPAROSCOPY;  Surgeon: Kelvin Hinton MD;  Location: [facility];  Service: Obstetrics/Gynecology;  Laterality: N/A;   • ENDOSCOPY     • LAPAROSCOPIC CHOLECYSTECTOMY  03/2020   • TUBAL COAGULATION LAPAROSCOPIC N/A 3/25/2022    Procedure: LAPAROSCOPIC BILATERAL TUBAL LIGATION, NEXPLANON REMOVAL. IUD INSERTION;  Surgeon: Kelvin Hinton MD;  Location: [facility];  Service: Obstetrics/Gynecology;  Laterality: N/A;         Social History     Socioeconomic History   • Marital status: Single   Tobacco Use   • Smoking status: Some Days     Packs/day: 0.25     Years: 2.00     Pack years: 0.50     Types: Cigarettes   • Smokeless tobacco: Never   • Tobacco comments:     1 pack per week   Vaping Use   • Vaping Use: Former   • Substances: THC   • Devices:  "Pre-filled or refillable cartridge   Substance and Sexual Activity   • Alcohol use: Never   • Drug use: Yes     Types: Marijuana     Comment: occasional   • Sexual activity: Defer     Birth control/protection: I.U.D.     Comment: IUD         Family History   Adopted: Yes   Problem Relation Age of Onset   • Heart murmur Other        Objective  Physical Exam:  /56   Pulse 67   Temp 98.3 °F (36.8 °C) (Oral)   Resp 16   Ht 167.6 cm (66\")   Wt 113 kg (250 lb)   SpO2 98%   BMI 40.35 kg/m²      Physical Exam    CONSTITUTIONAL: Well developed, nontoxic healthy-appearing 27-year-old female,  in no acute distress but tearful.  VITAL SIGNS: per nursing, reviewed and noted  SKIN: exposed skin with superficial longitudinal abrasions to the left volar aspect of the forearm  EYES: Grossly EOMI, no icterus  ENT: Normal voice.  Moist mucous membranes   RESPIRATORY:  No increased work of breathing. No retractions.   CARDIOVASCULAR:   Extremities pink and warm.  Good cap refill to extremities.   GI: Abdomen without distention   MUSCULOSKELETAL: Age-appropriate bulk and tone, moves all 4 extremities  NEUROLOGIC: Alert, oriented x 3. No gross deficits. GCS 15.   PSYCH: appropriate affect.    Procedures    ED Course:    Lab Results (last 24 hours)     Procedure Component Value Units Date/Time    CBC & Differential [[id number]]  (Abnormal) Collected: 06/14/23 1742    Specimen: Blood Updated: 06/14/23 1751    Narrative:      The following orders were created for panel order CBC & Differential.  Procedure                               Abnormality         Status                     ---------                               -----------         ------                     CBC Auto Differential[[id number]]        Abnormal            Final result                 Please view results for these tests on the individual orders.    Comprehensive Metabolic Panel [[id number]]  (Abnormal) Collected: 06/14/23 1742    Specimen: Blood Updated: " 06/14/23 1813     Glucose 129 mg/dL      BUN 20 mg/dL      Creatinine 0.74 mg/dL      Sodium 138 mmol/L      Potassium 3.5 mmol/L      Chloride 105 mmol/L      CO2 21.7 mmol/L      Calcium 9.3 mg/dL      Total Protein 7.2 g/dL      Albumin 4.5 g/dL      ALT (SGPT) 40 U/L      AST (SGOT) 40 U/L      Alkaline Phosphatase 61 U/L      Total Bilirubin 0.5 mg/dL      Globulin 2.7 gm/dL      A/G Ratio 1.7 g/dL      BUN/Creatinine Ratio 27.0     Anion Gap 11.3 mmol/L      eGFR 114.6 mL/min/1.73     Narrative:      GFR Normal >60  Chronic Kidney Disease <60  Kidney Failure <15      Acetaminophen Level [[id number]]  (Normal) Collected: 06/14/23 1742    Specimen: Blood Updated: 06/14/23 1813     Acetaminophen <5.0 mcg/mL     Narrative:      Toxic = Greater than 150 mcg/mL    Ethanol [[id number]] Collected: 06/14/23 1742    Specimen: Blood Updated: 06/14/23 1813     Ethanol <10 mg/dL      Ethanol % <0.010 %     Narrative:      This result is for medical use only and should not be used for forensic purposes.    Salicylate Level [[id number]]  (Normal) Collected: 06/14/23 1742    Specimen: Blood Updated: 06/14/23 1813     Salicylate <0.3 mg/dL     CBC Auto Differential [[id number]]  (Abnormal) Collected: 06/14/23 1742    Specimen: Blood Updated: 06/14/23 1751     WBC 7.99 10*3/mm3      RBC 5.02 10*6/mm3      Hemoglobin 15.0 g/dL      Hematocrit 41.7 %      MCV 83.1 fL      MCH 29.9 pg      MCHC 36.0 g/dL      RDW 12.1 %      RDW-SD 36.3 fl      MPV 10.4 fL      Platelets 277 10*3/mm3      Neutrophil % 55.0 %      Lymphocyte % 35.2 %      Monocyte % 5.5 %      Eosinophil % 3.4 %      Basophil % 0.6 %      Immature Grans % 0.3 %      Neutrophils, Absolute 4.40 10*3/mm3      Lymphocytes, Absolute 2.81 10*3/mm3      Monocytes, Absolute 0.44 10*3/mm3      Eosinophils, Absolute 0.27 10*3/mm3      Basophils, Absolute 0.05 10*3/mm3      Immature Grans, Absolute 0.02 10*3/mm3      nRBC 0.0 /100 WBC     COVID PRE-OP / PRE-PROCEDURE SCREENING  ORDER (NO ISOLATION) - Swab, Nasal Cavity [[id number]]  (Normal) Collected: 06/14/23 1745    Specimen: Swab from Nasal Cavity Updated: 06/14/23 1823    Narrative:      The following orders were created for panel order COVID PRE-OP / PRE-PROCEDURE SCREENING ORDER (NO ISOLATION) - Swab, Nasal Cavity.  Procedure                               Abnormality         Status                     ---------                               -----------         ------                     COVID-19,[facility]...[[id number]]  Normal              Final result                 Please view results for these tests on the individual orders.    COVID-19,[facility] IN-HOUSE,Nasal Swab No Transport Media 3-4 HR TAT - Swab, Nasal Cavity [[id number]]  (Normal) Collected: 06/14/23 1745    Specimen: Swab from Nasal Cavity Updated: 06/14/23 1823     COVID19 Not Detected    Narrative:      Fact sheet for providers: https://www.fda.gov/media/905012/download     Fact sheet for patients: https://www.fda.gov/media/357742/download    Test performed by PCR.    Consider negative results in combination with clinical observations, patient history, and epidemiological information.    Urine Drug Screen - Urine, Clean Catch [[id number]]  (Abnormal) Collected: 06/14/23 1755    Specimen: Urine, Clean Catch Updated: 06/14/23 1816     THC, Screen, Urine Positive     Phencyclidine (PCP), Urine Negative     Cocaine Screen, Urine Negative     Methamphetamine, Ur Negative     Opiate Screen Negative     Amphetamine Screen, Urine Negative     Benzodiazepine Screen, Urine Negative     Tricyclic Antidepressants Screen Negative     Methadone Screen, Urine Negative     Barbiturates Screen, Urine Negative     Oxycodone Screen, Urine Negative     Propoxyphene Screen Negative     Buprenorphine, Screen, Urine Negative    Narrative:      Limitations of this procedure include the possibility of false positives due to interfering substances in the urine sample. Clinical data should be  correlated with any questionable result. Positive results should be considered Presumptive Positive until results are confirmed with another methodology such as HPLC or GCMS.    Pregnancy, Urine - Urine, Clean Catch [[id number]]  (Normal) Collected: 06/14/23 1755    Specimen: Urine, Clean Catch Updated: 06/14/23 1805     HCG, Urine QL Negative    Urinalysis With Microscopic - Urine, Clean Catch [[id number]]  (Abnormal) Collected: 06/14/23 1755    Specimen: Urine, Clean Catch Updated: 06/14/23 2018    Narrative:      The following orders were created for panel order Urinalysis With Microscopic - Urine, Clean Catch.  Procedure                               Abnormality         Status                     ---------                               -----------         ------                     Urinalysis without micro...[[id number]]  Abnormal            Final result               Urinalysis, Microscopic ...[[id number]]  Abnormal            Final result                 Please view results for these tests on the individual orders.    Urinalysis without microscopic (no culture) - Urine, Clean Catch [[id number]]  (Abnormal) Collected: 06/14/23 1755    Specimen: Urine, Clean Catch Updated: 06/14/23 2011     Color, UA Dark Yellow     Appearance, UA Turbid     pH, UA 6.0     Specific Gravity, UA >=1.030     Glucose, UA Negative     Ketones, UA Trace     Bilirubin, UA Negative     Blood, UA Negative     Protein, UA >=300 mg/dL (3+)     Leuk Esterase, UA Small (1+)     Nitrite, UA Negative     Urobilinogen, UA 1.0 E.U./dL    Urinalysis, Microscopic Only - Urine, Clean Catch [[id number]]  (Abnormal) Collected: 06/14/23 1755    Specimen: Urine, Clean Catch Updated: 06/14/23 2018     RBC, UA None Seen /HPF      WBC, UA 3-5 /HPF      Bacteria, UA 1+ /HPF      Squamous Epithelial Cells, UA 3-6 /HPF      Hyaline Casts, UA None Seen /LPF      Methodology Manual Light Microscopy           No radiology results from the last 24 hrs     [facility]    ED  Course as of 06/14/23 2240 Wed Jun 14, 2023   1828 Labs nonactionable, medically stable for psychiatric evaluation.  Referral placed to behavioral health. [PF]      ED Course User Index  [PF] Edi Dan DO       Medical Decision Making:    Initial impression of presenting illness: 27-year-old female presents with suicidal ideations and self-inflicted abrasions to left arm.      DDX: includes but is not limited to: Suicidal ideation         Patient arrives normotensive afebrile no tachycardia oxygen saturations 98% with vitals interpreted by myself.     Pertinent features from physical exam: Superficial abrasions to the left forearm.    Initial diagnostic plan: Psychiatric clearance labs EKG    Results from initial plan were reviewed and interpreted by me revealing nonactionable labs nonischemic EKG without ectopy.  COVID-negative    Diagnostic information from other sources: None    Interventions / Re-evaluation: First-aid care provided by nursing staff, bacitracin and nonadherent dressing    Results/clinical rationale were discussed with patient    Consultations/Discussion of results with other physicians: Discussed with behavioral health for coordination of care regarding behavioral health disposition.  Patient evaluated by [facility] who recommended inpatient stay, excepted by [facility].    Disposition plan: Transfer  -----      Final diagnoses:   Suicidal ideation   Deliberate self-cutting            Edge, Ramon Mcdermott MD  06/14/23 2240

## 2023-06-15 NOTE — ED NOTES
Patient care was transferred from Day Shift Navigator to Night Shift Navigator at 21:11    22:20 Johnson from [facility] Called and Layla GOMEZ accepted patient. Report is to be called at [phone].    22:22 [facility] ETA  23:45    22:25 Therapist updated Patient on acceptance to [facility]. Patient continues to be agreeable to go voluntarily to inpatient.     22:27 Therapist updated Treatment team of acceptance and ETA. Therapist facilitated RN to RN reporting.    Terrell Cash MA [facility]  06/14/2023     Terrell Cash  06/14/23 3345
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
## 2023-10-18 ENCOUNTER — OFFICE VISIT (OUTPATIENT)
Dept: FAMILY MEDICINE CLINIC | Facility: CLINIC | Age: 28
End: 2023-10-18
Payer: COMMERCIAL

## 2023-10-18 VITALS
HEART RATE: 92 BPM | SYSTOLIC BLOOD PRESSURE: 110 MMHG | HEIGHT: 67 IN | DIASTOLIC BLOOD PRESSURE: 80 MMHG | OXYGEN SATURATION: 97 % | BODY MASS INDEX: 38.92 KG/M2 | WEIGHT: 248 LBS

## 2023-10-18 DIAGNOSIS — M25.511 CHRONIC RIGHT SHOULDER PAIN: ICD-10-CM

## 2023-10-18 DIAGNOSIS — G89.29 CHRONIC RIGHT SHOULDER PAIN: ICD-10-CM

## 2023-10-18 DIAGNOSIS — F39 MOOD DISORDER: Primary | ICD-10-CM

## 2023-10-18 DIAGNOSIS — Z51.81 ENCOUNTER FOR THERAPEUTIC DRUG MONITORING: ICD-10-CM

## 2023-10-18 DIAGNOSIS — F41.1 GAD (GENERALIZED ANXIETY DISORDER): ICD-10-CM

## 2023-10-18 DIAGNOSIS — Z13.1 SCREENING FOR DIABETES MELLITUS: ICD-10-CM

## 2023-10-18 DIAGNOSIS — R53.83 FATIGUE, UNSPECIFIED TYPE: ICD-10-CM

## 2023-10-18 DIAGNOSIS — F33.1 MAJOR DEPRESSIVE DISORDER, RECURRENT EPISODE, MODERATE DEGREE: ICD-10-CM

## 2023-10-18 PROCEDURE — 1159F MED LIST DOCD IN RCRD: CPT | Performed by: NURSE PRACTITIONER

## 2023-10-18 PROCEDURE — 1160F RVW MEDS BY RX/DR IN RCRD: CPT | Performed by: NURSE PRACTITIONER

## 2023-10-18 RX ORDER — MELOXICAM 15 MG/1
1 TABLET ORAL DAILY
COMMUNITY
Start: 2023-09-06 | End: 2023-10-18 | Stop reason: SDUPTHER

## 2023-10-18 RX ORDER — MELOXICAM 15 MG/1
15 TABLET ORAL DAILY
Qty: 30 TABLET | Refills: 2 | Status: SHIPPED | OUTPATIENT
Start: 2023-10-18

## 2023-10-18 RX ORDER — LORAZEPAM 0.5 MG/1
0.5 TABLET ORAL EVERY 8 HOURS PRN
Qty: 15 TABLET | Refills: 0 | Status: SHIPPED | OUTPATIENT
Start: 2023-10-18
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
## 2023-10-19 ENCOUNTER — TELEPHONE (OUTPATIENT)
Dept: FAMILY MEDICINE CLINIC | Facility: CLINIC | Age: 28
End: 2023-10-19
Payer: COMMERCIAL

## 2023-10-19 NOTE — TELEPHONE ENCOUNTER
Need for clarification from [facility].    Per the pharmacy: lorazepam is on backorder for [facility]. May want to consider sending to a different pharmacy. Patient will be notified.

## 2023-10-26 LAB — DRUGS UR: NORMAL

## 2023-11-08 DIAGNOSIS — F41.1 GAD (GENERALIZED ANXIETY DISORDER): ICD-10-CM

## 2023-11-08 RX ORDER — LORAZEPAM 0.5 MG/1
0.5 TABLET ORAL EVERY 8 HOURS PRN
Qty: 15 TABLET | Refills: 0 | Status: SHIPPED | OUTPATIENT
Start: 2023-11-08

## 2023-11-08 NOTE — TELEPHONE ENCOUNTER
PATIENT CALLED ABOUT HER MEDICATION BEING SENT IN. SHE IS FEELING VERY ANXIOUS AND IS HOPING TO GET THIS FILLED TODAY. PLEASE ADVISE

## 2023-11-08 NOTE — TELEPHONE ENCOUNTER
Caller: Nery Ibarra    Relationship: Self    Best call back number: [phone]    Requested Prescriptions:   Requested Prescriptions     Pending Prescriptions Disp Refills    LORazepam (Ativan) 0.5 MG tablet 15 tablet 0     Sig: Take 1 tablet by mouth Every 8 (Eight) Hours As Needed for Anxiety.        Pharmacy where request should be sent:      [facility] - [place], [place] - [address] - [phone]  - [phone]     Last office visit with prescribing clinician: 10/18/2023   Last telemedicine visit with prescribing clinician: Visit date not found   Next office visit with prescribing clinician: 11/15/2023     Additional details provided by patient:     Does the patient have less than a 3 day supply:  [x] Yes  [] No    Would you like a call back once the refill request has been completed: [x] Yes [] No    If the office needs to give you a call back, can they leave a voicemail: [x] Yes [] No    Fady Mendoza Rep   11/08/23 13:21 EST

## 2023-11-08 NOTE — TELEPHONE ENCOUNTER
Rx Refill Note  Requested Prescriptions     Pending Prescriptions Disp Refills    LORazepam (Ativan) 0.5 MG tablet 15 tablet 0     Sig: Take 1 tablet by mouth Every 8 (Eight) Hours As Needed for Anxiety.      Last office visit with prescribing clinician: 10/18/2023   Last telemedicine visit with prescribing clinician: Visit date not found   Next office visit with prescribing clinician: Visit date not found                         Would you like a call back once the refill request has been completed: [] Yes [] No    If the office needs to give you a call back, can they leave a voicemail: [] Yes [] No    Denice Morgan MA  11/08/23, 17:12 EST

## 2023-11-08 NOTE — TELEPHONE ENCOUNTER
Camacho Luque,  The patient has requested a refill for her Lorazepam 0.5mg with no refills. I wasn't sure on this one and wanted you to view.    Please advise if I need to have her come in for and appt.

## 2023-11-09 NOTE — TELEPHONE ENCOUNTER
Patient called and I let her know that medication was sent and she cussed at me due to her medication being sent to [facility] pharmacy and not [facility]. Patient needs her medication sent to [facility].

## 2023-11-29 ENCOUNTER — HOSPITAL ENCOUNTER (OUTPATIENT)
Dept: MRI IMAGING | Facility: HOSPITAL | Age: 28
Discharge: HOME | End: 2023-11-29
Admitting: NURSE PRACTITIONER
Payer: COMMERCIAL

## 2023-11-29 DIAGNOSIS — M25.511 CHRONIC RIGHT SHOULDER PAIN: ICD-10-CM

## 2023-11-29 DIAGNOSIS — G89.29 CHRONIC RIGHT SHOULDER PAIN: ICD-10-CM

## 2023-11-29 PROCEDURE — 73221 MRI JOINT UPR EXTREM W/O DYE: CPT

## 2023-12-01 DIAGNOSIS — G89.29 CHRONIC RIGHT SHOULDER PAIN: ICD-10-CM

## 2023-12-01 DIAGNOSIS — M25.511 CHRONIC RIGHT SHOULDER PAIN: ICD-10-CM

## 2023-12-01 DIAGNOSIS — R93.6 ABNORMAL MRI, SHOULDER: Primary | ICD-10-CM

## 2023-12-27 ENCOUNTER — TELEPHONE (OUTPATIENT)
Dept: FAMILY MEDICINE CLINIC | Facility: CLINIC | Age: 28
End: 2023-12-27

## 2023-12-27 NOTE — TELEPHONE ENCOUNTER
Caller: Nery Ibarra    Relationship: Self    Best call back number: [phone]     Which medication are you concerned about: CORTISONE SHOT    Who prescribed you this medication: [facility]     When did you start taking this medication: RECEIVED SHOTS ON 12/26    What are your concerns: PATIENT HAS BEEN EXPERIENCING MORE PAIN IN NECK    How long have you had these concerns: 12/26 AFTER SHOTS  PLEASE CALL AND ADVISE PATIENT AS SHE IS CONCERNED THIS IS NOT A NORMAL REACTION.

## 2024-01-08 ENCOUNTER — OFFICE VISIT (OUTPATIENT)
Dept: FAMILY MEDICINE CLINIC | Facility: CLINIC | Age: 29
End: 2024-01-08
Payer: COMMERCIAL

## 2024-01-08 VITALS
OXYGEN SATURATION: 97 % | HEART RATE: 76 BPM | SYSTOLIC BLOOD PRESSURE: 110 MMHG | BODY MASS INDEX: 39.39 KG/M2 | WEIGHT: 251 LBS | DIASTOLIC BLOOD PRESSURE: 75 MMHG | HEIGHT: 67 IN

## 2024-01-08 DIAGNOSIS — F39 MOOD DISORDER: ICD-10-CM

## 2024-01-08 DIAGNOSIS — F41.1 GAD (GENERALIZED ANXIETY DISORDER): Primary | ICD-10-CM

## 2024-01-08 PROCEDURE — 1160F RVW MEDS BY RX/DR IN RCRD: CPT | Performed by: NURSE PRACTITIONER

## 2024-01-08 PROCEDURE — 1159F MED LIST DOCD IN RCRD: CPT | Performed by: NURSE PRACTITIONER

## 2024-01-08 PROCEDURE — 99214 OFFICE O/P EST MOD 30 MIN: CPT | Performed by: NURSE PRACTITIONER

## 2024-01-08 RX ORDER — LAMOTRIGINE 25 MG/1
TABLET ORAL
Qty: 52 TABLET | Refills: 0 | Status: SHIPPED | OUTPATIENT
Start: 2024-01-08

## 2024-01-08 RX ORDER — LORAZEPAM 0.5 MG/1
0.5 TABLET ORAL EVERY 8 HOURS PRN
Qty: 15 TABLET | Refills: 1 | Status: SHIPPED | OUTPATIENT
Start: 2024-01-08
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
## 2024-01-18 ENCOUNTER — OFFICE VISIT (OUTPATIENT)
Dept: FAMILY MEDICINE CLINIC | Facility: CLINIC | Age: 29
End: 2024-01-18
Payer: COMMERCIAL

## 2024-01-18 VITALS
TEMPERATURE: 97.8 F | HEIGHT: 67 IN | BODY MASS INDEX: 39.39 KG/M2 | OXYGEN SATURATION: 98 % | RESPIRATION RATE: 16 BRPM | DIASTOLIC BLOOD PRESSURE: 78 MMHG | SYSTOLIC BLOOD PRESSURE: 118 MMHG | WEIGHT: 251 LBS | HEART RATE: 78 BPM

## 2024-01-18 DIAGNOSIS — F41.1 GAD (GENERALIZED ANXIETY DISORDER): ICD-10-CM

## 2024-01-18 DIAGNOSIS — F39 MOOD DISORDER: Primary | ICD-10-CM

## 2024-01-18 RX ORDER — OXCARBAZEPINE 150 MG/1
75 TABLET, FILM COATED ORAL NIGHTLY
Qty: 15 TABLET | Refills: 0 | Status: SHIPPED | OUTPATIENT
Start: 2024-01-18

## 2024-01-18 NOTE — PROGRESS NOTES
"                      Established Patient        Chief Complaint:   Chief Complaint   Patient presents with    Anxiety     Pt is here to change lamictal. Pt states the medicine is making her worse.            History of Present Illness:    Nery Ibarra is a 28 y.o. female who presents today to discuss medication concerns.  History of anxiety.  Last seen in office by PCP on 1/8/2024.  Patient has trialed and failed Vraylar.  Previously reported irritability, suicidal ideations, PTSD, insomnia.  Was prescribed Lamictal to trial.  Reports that she feels the medication is \"making her worse\".  Patient has been referred to behavioral health.  Upcoming appointment with PCP on 1/30/2024 and behavioral health on 2/26/2024.    Patient reports increased irritability, severe depression, insomnia, feels \"manic\" since starting Lamictal. Last took this morning. Noticed AE this morning.     Still taking Ativan 0.5mg TID PRN for panic attacks--one every 2-3 days     Subjective     The following portions of the patient's history were reviewed and updated as appropriate: allergies, current medications, past family history, past medical history, past social history, past surgical history and problem list.    ALLERGIES  Allergies   Allergen Reactions    Dilaudid [Hydromorphone] Nausea And Vomiting    Flexeril [Cyclobenzaprine] Confusion       ROS  Review of Systems   Constitutional:  Negative for fatigue.   Gastrointestinal:  Negative for nausea and vomiting.   Psychiatric/Behavioral:  Positive for agitation and sleep disturbance. Negative for self-injury and suicidal ideas. The patient is nervous/anxious and is hyperactive.        Objective     Vital Signs:   /78   Pulse 78   Temp 97.8 °F (36.6 °C)   Resp 16   Ht 170.2 cm (67\")   Wt 114 kg (251 lb)   SpO2 98%   BMI 39.31 kg/m²             Physical Exam   Physical Exam  Vitals reviewed.   Constitutional:       Appearance: She is obese.   Eyes:      Extraocular " Movements: Extraocular movements intact.      Pupils: Pupils are equal, round, and reactive to light.   Cardiovascular:      Rate and Rhythm: Normal rate and regular rhythm.   Pulmonary:      Effort: Pulmonary effort is normal.      Breath sounds: Normal breath sounds.   Neurological:      Mental Status: She is alert and oriented to person, place, and time.   Psychiatric:         Mood and Affect: Mood normal.         Behavior: Behavior normal.         Assessment and Plan      Assessment/Plan:   Diagnoses and all orders for this visit:    1. Mood disorder (Primary)  -     OXcarbazepine (Trileptal) 150 MG tablet; Take 0.5 tablets by mouth Every Night.  Dispense: 15 tablet; Refill: 0    2. GIBRAN (generalized anxiety disorder)  -     OXcarbazepine (Trileptal) 150 MG tablet; Take 0.5 tablets by mouth Every Night.  Dispense: 15 tablet; Refill: 0      Risks, benefits, and potential side effects of current/new medications reviewed with patient.  Patient voiced understanding and wished to proceed with treatment.    Discontinue Lamictal.    Will trial Trileptal 75mg nightly x 2 weeks. Follow up with PCP on 01/30. If tolerating, may increase dose to 150mg nightly at follow up appointment.     Discussed need for stress/anxiety reducing techniques such as prayer/meditation/breathing and counting exercises and avoidance of stress producing environments/situations; will follow clinically.    Discussion Summary:  Discussed plan of care in detail with pt today; pt verb understanding and agrees.      I have reviewed and updated all copied forward information, as appropriate.  I attest to the accuracy and relevance of any unchanged information.      Follow up:  Return for Follow up with PCP in 3 months..     Patient Education:  There are no Patient Instructions on file for this visit.    JASON Polk  01/30/24  20:17 EST          Please note that portions of this note may have been completed with a voice recognition program.

## 2024-01-31 ENCOUNTER — OFFICE VISIT (OUTPATIENT)
Dept: OBSTETRICS AND GYNECOLOGY | Facility: CLINIC | Age: 29
End: 2024-01-31
Payer: COMMERCIAL

## 2024-01-31 ENCOUNTER — PREP FOR SURGERY (OUTPATIENT)
Dept: OTHER | Facility: HOSPITAL | Age: 29
End: 2024-01-31
Payer: COMMERCIAL

## 2024-01-31 VITALS
SYSTOLIC BLOOD PRESSURE: 118 MMHG | WEIGHT: 250 LBS | BODY MASS INDEX: 39.24 KG/M2 | DIASTOLIC BLOOD PRESSURE: 78 MMHG | HEIGHT: 67 IN

## 2024-01-31 DIAGNOSIS — N93.9 ABNORMAL UTERINE BLEEDING (AUB): Primary | ICD-10-CM

## 2024-01-31 DIAGNOSIS — R10.2 PELVIC PAIN: ICD-10-CM

## 2024-01-31 DIAGNOSIS — Z98.51 S/P TUBAL LIGATION: ICD-10-CM

## 2024-01-31 DIAGNOSIS — N94.6 DYSMENORRHEA: ICD-10-CM

## 2024-01-31 DIAGNOSIS — Z97.5 IUD (INTRAUTERINE DEVICE) IN PLACE: ICD-10-CM

## 2024-01-31 DIAGNOSIS — G89.29 CHRONIC PELVIC PAIN IN FEMALE: ICD-10-CM

## 2024-01-31 DIAGNOSIS — Z98.51 H/O TUBAL LIGATION: ICD-10-CM

## 2024-01-31 DIAGNOSIS — R10.2 CHRONIC PELVIC PAIN IN FEMALE: ICD-10-CM

## 2024-01-31 DIAGNOSIS — Z30.432 ENCOUNTER FOR IUD REMOVAL: ICD-10-CM

## 2024-01-31 PROCEDURE — 99214 OFFICE O/P EST MOD 30 MIN: CPT | Performed by: OBSTETRICS & GYNECOLOGY

## 2024-01-31 RX ORDER — SODIUM CHLORIDE 0.9 % (FLUSH) 0.9 %
3 SYRINGE (ML) INJECTION EVERY 12 HOURS SCHEDULED
OUTPATIENT
Start: 2024-01-31

## 2024-01-31 RX ORDER — SODIUM CHLORIDE 0.9 % (FLUSH) 0.9 %
10 SYRINGE (ML) INJECTION AS NEEDED
OUTPATIENT
Start: 2024-01-31

## 2024-01-31 RX ORDER — SODIUM CHLORIDE 9 MG/ML
40 INJECTION, SOLUTION INTRAVENOUS AS NEEDED
OUTPATIENT
Start: 2024-01-31

## 2024-02-07 DIAGNOSIS — F41.1 GAD (GENERALIZED ANXIETY DISORDER): ICD-10-CM

## 2024-02-07 DIAGNOSIS — F39 MOOD DISORDER: ICD-10-CM

## 2024-02-07 RX ORDER — OXCARBAZEPINE 150 MG/1
75 TABLET, FILM COATED ORAL NIGHTLY
Qty: 15 TABLET | Refills: 0 | OUTPATIENT
Start: 2024-02-07

## 2024-02-07 NOTE — TELEPHONE ENCOUNTER
Caller: Nery Ibarra    Relationship: Self    Best call back number: [phone]    Requested Prescriptions:   Requested Prescriptions     Pending Prescriptions Disp Refills    OXcarbazepine (Trileptal) 150 MG tablet 15 tablet 0     Sig: Take 0.5 tablets by mouth Every Night.        Pharmacy where request should be sent:  [facility] [address] [place] [address] [phone] [facility] [phone] FX        Last office visit with prescribing clinician: 1/8/2024   Last telemedicine visit with prescribing clinician: Visit date not found   Next office visit with prescribing clinician: Visit date not found         Does the patient have less than a 3 day supply:  [x] Yes  [] No    Would you like a call back once the refill request has been completed: [] Yes [x] No    If the office needs to give you a call back, can they leave a voicemail: [] Yes [x] No    Fady Carrasquillo Rep   02/07/24 11:34 EST

## 2024-02-07 NOTE — PROGRESS NOTES
GYN Office Visit    Subjective   Chief Complaint   Patient presents with    Contraception     IUD  check     Nery Ibarra is a 28 y.o. year old  presenting to be seen for follow-up bleeding and pain.    She reports ongoing issues with pelvic pain and vaginal bleeding.  Her bleeding can be very heavy at times.  She underwent laparoscopic tubal + IUD insertion in 2022.  Her bleeding and pain symptoms are debilitating and affects her ability to participate in activities of daily life.    OB Hx: VAVD x 1  Pap smear:     Past Medical History:   Diagnosis Date    Anxiety     Arthritis     Asthma     no rescue inhaler    Depression     Gallstones     GERD (gastroesophageal reflux disease)     PCOS (polycystic ovarian syndrome)     PONV (postoperative nausea and vomiting)     Seasonal allergies     Tattoo     x2    Urogenital trichomoniasis     Treated last year    UTI (urinary tract infection)        Past Surgical History:   Procedure Laterality Date    BILE DUCT STENT PLACEMENT  2020    DENTAL PROCEDURE      Age 4    DIAGNOSTIC LAPAROSCOPY N/A 2021    Procedure: DIAGNOSTIC LAPAROSCOPY;  Surgeon: Kelvin Hinton MD;  Location: [facility];  Service: Obstetrics/Gynecology;  Laterality: N/A;    ENDOSCOPY      LAPAROSCOPIC CHOLECYSTECTOMY  2020    TUBAL COAGULATION LAPAROSCOPIC N/A 3/25/2022    Procedure: LAPAROSCOPIC BILATERAL TUBAL LIGATION, NEXPLANON REMOVAL. IUD INSERTION;  Surgeon: Kelvin Hinton MD;  Location: [facility];  Service: Obstetrics/Gynecology;  Laterality: N/A;       Family History   Adopted: Yes   Problem Relation Age of Onset    Heart murmur Other         Social History     Tobacco Use    Smoking status: Some Days     Packs/day: 0.25     Years: 2.00     Additional pack years: 0.00     Total pack years: 0.50     Types: Cigarettes     Passive exposure: Never    Smokeless tobacco: Never    Tobacco comments:     1 pack per week   Vaping Use    Vaping Use: Former     "Substances: THC    Devices: Pre-filled or refillable cartridge   Substance Use Topics    Alcohol use: Never    Drug use: Yes     Types: Marijuana     Comment: occasional       (Not in a hospital admission)      Dilaudid [hydromorphone] and Flexeril [cyclobenzaprine]    Current Outpatient Medications on File Prior to Visit   Medication Sig Dispense Refill    levonorgestrel (Mirena, 52 MG,) 20 MCG/24HR IUD 1 each by Intrauterine route 1 (One) Time for 1 dose. 1 each 0    LORazepam (Ativan) 0.5 MG tablet Take 1 tablet by mouth Every 8 (Eight) Hours As Needed for Anxiety. 15 tablet 1    meloxicam (MOBIC) 15 MG tablet Take 1 tablet by mouth Daily. 30 tablet 2    OXcarbazepine (Trileptal) 150 MG tablet Take 0.5 tablets by mouth Every Night. 15 tablet 0     No current facility-administered medications on file prior to visit.       Social History    Tobacco Use      Smoking status: Some Days        Packs/day: 0.25        Years: 2.00        Additional pack years: 0.00        Total pack years: 0.50        Types: Cigarettes        Passive exposure: Never      Smokeless tobacco: Never      Tobacco comments: 1 pack per week         Objective   /78   Ht 170.2 cm (67\")   Wt 113 kg (250 lb)   BMI 39.16 kg/m²     Physical Exam:  General Appearance: alert, pleasant, appears stated age, interactive and cooperative         Medical Decision Making:    I reviewed her operative note from 2022.    Assessment   Abnormal uterine bleeding  Menorrhagia with irregular cycle  IUD in place  Pelvic pain and dysmenorrhea  Previous tubal ligation     Plan    No orders of the defined types were placed in this encounter.      Medication Management: None    Procedures Performed: None    We reviewed her situation in detail today.  We discussed alternative options to address her pain and bleeding symptoms.  We reviewed medical therapies as well as surgical interventions.  Ultimately, the patient opted for endometrial ablation.  Plan for IUD " removal, hysteroscopy with possible MyoSure, dilation and curettage, endometrial ablation with Cerene.  Risks for the procedure were reviewed in detail today.  All questions answered.    Kelvin Hinton MD  Obstetrics and Gynecology  [facility]

## 2024-02-12 DIAGNOSIS — F39 MOOD DISORDER: ICD-10-CM

## 2024-02-12 DIAGNOSIS — F41.1 GAD (GENERALIZED ANXIETY DISORDER): ICD-10-CM

## 2024-02-12 RX ORDER — OXCARBAZEPINE 150 MG/1
75 TABLET, FILM COATED ORAL NIGHTLY
Qty: 15 TABLET | Refills: 0 | Status: SHIPPED | OUTPATIENT
Start: 2024-02-12

## 2024-02-28 NOTE — PRE-PROCEDURE INSTRUCTIONS
PAT phone history completed with patient for upcoming procedure on 3/1/24.    PAT PASS reviewed with patient and he/she verbalized understanding of the following:     Do not eat or drink anything after midnight the night before procedure unless otherwise instructed by physician/surgeon's office, this includes no gum, candy, mints, tobacco products or e-cigarettes.  Do not shave the area to be operated on at least 48 hours prior to procedure.  Do not wear makeup, lotion, hair products, or nail polish.  Do not wear any jewelry and remove all piercings.  Do not wear any adhesive if you wear dentures.  Do not wear contacts; bring in glasses if needed.  Only take medications on the morning of procedure as instructed by PAT nurse per anesthesia guidelines or as instructed by physician's office.   If you are on any blood thinners reach out to the physician/surgeon's office for instructions on when/if they will need to be stopped prior to procedure.   Bring in picture ID and insurance card, advanced directive copies if applicable, CPAP/BIPAP/Inhalers if indicated morning of procedure, leave any other valuables at home.  Ensure you have arranged for someone to drive you home the day of your procedure and someone to care for you at home afterwards. It is recommended that you do not drive, drink alcohol, or make any major legal decisions for at least 24 hours after your procedure is complete.    Instructions given on hospital entrance and registration location.

## 2024-03-01 ENCOUNTER — HOSPITAL ENCOUNTER (OUTPATIENT)
Facility: HOSPITAL | Age: 29
Setting detail: HOSPITAL OUTPATIENT SURGERY
Discharge: HOME | End: 2024-03-01
Attending: OBSTETRICS & GYNECOLOGY | Admitting: OBSTETRICS & GYNECOLOGY
Payer: COMMERCIAL

## 2024-03-01 ENCOUNTER — ANESTHESIA (OUTPATIENT)
Dept: PERIOP | Facility: HOSPITAL | Age: 29
End: 2024-03-01
Payer: COMMERCIAL

## 2024-03-01 ENCOUNTER — ANESTHESIA EVENT (OUTPATIENT)
Dept: PERIOP | Facility: HOSPITAL | Age: 29
End: 2024-03-01
Payer: COMMERCIAL

## 2024-03-01 VITALS
DIASTOLIC BLOOD PRESSURE: 88 MMHG | HEART RATE: 103 BPM | WEIGHT: 250 LBS | OXYGEN SATURATION: 96 % | RESPIRATION RATE: 16 BRPM | TEMPERATURE: 98.2 F | SYSTOLIC BLOOD PRESSURE: 134 MMHG | BODY MASS INDEX: 39.16 KG/M2

## 2024-03-01 DIAGNOSIS — R10.2 PELVIC PAIN: ICD-10-CM

## 2024-03-01 DIAGNOSIS — Z30.432 ENCOUNTER FOR IUD REMOVAL: ICD-10-CM

## 2024-03-01 DIAGNOSIS — Z98.51 H/O TUBAL LIGATION: ICD-10-CM

## 2024-03-01 DIAGNOSIS — N93.9 ABNORMAL UTERINE BLEEDING (AUB): ICD-10-CM

## 2024-03-01 PROBLEM — Z98.890 S/P ENDOMETRIAL ABLATION: Status: ACTIVE | Noted: 2024-03-01

## 2024-03-01 PROBLEM — Z97.5 IUD (INTRAUTERINE DEVICE) IN PLACE: Status: RESOLVED | Noted: 2022-03-25 | Resolved: 2024-03-01

## 2024-03-01 LAB
ANION GAP SERPL CALCULATED.3IONS-SCNC: 13.4 MMOL/L (ref 5–15)
B-HCG UR QL: NEGATIVE
BACTERIA UR QL AUTO: ABNORMAL /HPF
BASOPHILS # BLD AUTO: 0.07 10*3/MM3 (ref 0–0.2)
BASOPHILS NFR BLD AUTO: 0.6 % (ref 0–1.5)
BILIRUB UR QL STRIP: NEGATIVE
BUN SERPL-MCNC: 22 MG/DL (ref 6–20)
BUN/CREAT SERPL: 27.5 (ref 7–25)
CALCIUM SPEC-SCNC: 9.4 MG/DL (ref 8.6–10.5)
CHLORIDE SERPL-SCNC: 108 MMOL/L (ref 98–107)
CLARITY UR: ABNORMAL
CO2 SERPL-SCNC: 20.6 MMOL/L (ref 22–29)
COLOR UR: YELLOW
CREAT SERPL-MCNC: 0.8 MG/DL (ref 0.57–1)
DEPRECATED RDW RBC AUTO: 39 FL (ref 37–54)
EGFRCR SERPLBLD CKD-EPI 2021: 103.7 ML/MIN/1.73
EOSINOPHIL # BLD AUTO: 0.11 10*3/MM3 (ref 0–0.4)
EOSINOPHIL NFR BLD AUTO: 1 % (ref 0.3–6.2)
ERYTHROCYTE [DISTWIDTH] IN BLOOD BY AUTOMATED COUNT: 12.2 % (ref 12.3–15.4)
EXPIRATION DATE: NORMAL
GLUCOSE SERPL-MCNC: 87 MG/DL (ref 65–99)
GLUCOSE UR STRIP-MCNC: NEGATIVE MG/DL
HCT VFR BLD AUTO: 45 % (ref 34–46.6)
HGB BLD-MCNC: 15.7 G/DL (ref 12–15.9)
HGB UR QL STRIP.AUTO: ABNORMAL
HYALINE CASTS UR QL AUTO: ABNORMAL /LPF
IMM GRANULOCYTES # BLD AUTO: 0.03 10*3/MM3 (ref 0–0.05)
IMM GRANULOCYTES NFR BLD AUTO: 0.3 % (ref 0–0.5)
INTERNAL NEGATIVE CONTROL: NEGATIVE
INTERNAL POSITIVE CONTROL: POSITIVE
KETONES UR QL STRIP: NEGATIVE
LEUKOCYTE ESTERASE UR QL STRIP.AUTO: ABNORMAL
LYMPHOCYTES # BLD AUTO: 2.85 10*3/MM3 (ref 0.7–3.1)
LYMPHOCYTES NFR BLD AUTO: 24.7 % (ref 19.6–45.3)
Lab: NORMAL
MCH RBC QN AUTO: 30.6 PG (ref 26.6–33)
MCHC RBC AUTO-ENTMCNC: 34.9 G/DL (ref 31.5–35.7)
MCV RBC AUTO: 87.7 FL (ref 79–97)
MONOCYTES # BLD AUTO: 0.86 10*3/MM3 (ref 0.1–0.9)
MONOCYTES NFR BLD AUTO: 7.5 % (ref 5–12)
MUCOUS THREADS URNS QL MICRO: ABNORMAL /HPF
NEUTROPHILS NFR BLD AUTO: 65.9 % (ref 42.7–76)
NEUTROPHILS NFR BLD AUTO: 7.62 10*3/MM3 (ref 1.7–7)
NITRITE UR QL STRIP: POSITIVE
NRBC BLD AUTO-RTO: 0 /100 WBC (ref 0–0.2)
PH UR STRIP.AUTO: 6 [PH] (ref 5–8)
PLATELET # BLD AUTO: 277 10*3/MM3 (ref 140–450)
PMV BLD AUTO: 9.9 FL (ref 6–12)
POTASSIUM SERPL-SCNC: 3.6 MMOL/L (ref 3.5–5.2)
PROT UR QL STRIP: ABNORMAL
RBC # BLD AUTO: 5.13 10*6/MM3 (ref 3.77–5.28)
RBC # UR STRIP: ABNORMAL /HPF
REF LAB TEST METHOD: ABNORMAL
SODIUM SERPL-SCNC: 142 MMOL/L (ref 136–145)
SP GR UR STRIP: 1.02 (ref 1–1.03)
SQUAMOUS #/AREA URNS HPF: ABNORMAL /HPF
UROBILINOGEN UR QL STRIP: ABNORMAL
WBC # UR STRIP: ABNORMAL /HPF
WBC NRBC COR # BLD AUTO: 11.54 10*3/MM3 (ref 3.4–10.8)

## 2024-03-01 PROCEDURE — 25010000002 KETOROLAC TROMETHAMINE PER 15 MG: Performed by: NURSE ANESTHETIST, CERTIFIED REGISTERED

## 2024-03-01 PROCEDURE — 25010000002 GLYCOPYRROLATE PF 0.4 MG/2ML SOLUTION: Performed by: NURSE ANESTHETIST, CERTIFIED REGISTERED

## 2024-03-01 PROCEDURE — 25010000002 HYDROMORPHONE 1 MG/ML SOLUTION: Performed by: NURSE ANESTHETIST, CERTIFIED REGISTERED

## 2024-03-01 PROCEDURE — 87077 CULTURE AEROBIC IDENTIFY: CPT | Performed by: OBSTETRICS & GYNECOLOGY

## 2024-03-01 PROCEDURE — 87086 URINE CULTURE/COLONY COUNT: CPT | Performed by: OBSTETRICS & GYNECOLOGY

## 2024-03-01 PROCEDURE — 25810000003 LACTATED RINGERS PER 1000 ML: Performed by: NURSE ANESTHETIST, CERTIFIED REGISTERED

## 2024-03-01 PROCEDURE — 25010000002 ONDANSETRON PER 1 MG: Performed by: NURSE ANESTHETIST, CERTIFIED REGISTERED

## 2024-03-01 PROCEDURE — 81025 URINE PREGNANCY TEST: CPT | Performed by: OBSTETRICS & GYNECOLOGY

## 2024-03-01 PROCEDURE — 25010000002 FENTANYL CITRATE (PF) 50 MCG/ML SOLUTION: Performed by: NURSE ANESTHETIST, CERTIFIED REGISTERED

## 2024-03-01 PROCEDURE — S0260 H&P FOR SURGERY: HCPCS | Performed by: OBSTETRICS & GYNECOLOGY

## 2024-03-01 PROCEDURE — 25010000002 METOCLOPRAMIDE PER 10 MG: Performed by: NURSE ANESTHETIST, CERTIFIED REGISTERED

## 2024-03-01 PROCEDURE — 25010000002 MIDAZOLAM PER 1MG: Performed by: NURSE ANESTHETIST, CERTIFIED REGISTERED

## 2024-03-01 PROCEDURE — 58563 HYSTEROSCOPY ABLATION: CPT | Performed by: OBSTETRICS & GYNECOLOGY

## 2024-03-01 PROCEDURE — 25810000003 LACTATED RINGERS PER 1000 ML: Performed by: OBSTETRICS & GYNECOLOGY

## 2024-03-01 PROCEDURE — 25010000002 DIPHENHYDRAMINE PER 50 MG: Performed by: NURSE ANESTHETIST, CERTIFIED REGISTERED

## 2024-03-01 PROCEDURE — 80048 BASIC METABOLIC PNL TOTAL CA: CPT | Performed by: OBSTETRICS & GYNECOLOGY

## 2024-03-01 PROCEDURE — 25010000002 HALOPERIDOL LACTATE PER 5 MG: Performed by: NURSE ANESTHETIST, CERTIFIED REGISTERED

## 2024-03-01 PROCEDURE — 25810000003 SODIUM CHLORIDE 0.9 % SOLUTION: Performed by: OBSTETRICS & GYNECOLOGY

## 2024-03-01 PROCEDURE — 85025 COMPLETE CBC W/AUTO DIFF WBC: CPT | Performed by: OBSTETRICS & GYNECOLOGY

## 2024-03-01 PROCEDURE — 25010000002 PROPOFOL 10 MG/ML EMULSION: Performed by: NURSE ANESTHETIST, CERTIFIED REGISTERED

## 2024-03-01 PROCEDURE — 81001 URINALYSIS AUTO W/SCOPE: CPT | Performed by: OBSTETRICS & GYNECOLOGY

## 2024-03-01 PROCEDURE — 25010000002 DEXAMETHASONE PER 1 MG: Performed by: NURSE ANESTHETIST, CERTIFIED REGISTERED

## 2024-03-01 PROCEDURE — 58301 REMOVE INTRAUTERINE DEVICE: CPT | Performed by: OBSTETRICS & GYNECOLOGY

## 2024-03-01 PROCEDURE — 87186 SC STD MICRODIL/AGAR DIL: CPT | Performed by: OBSTETRICS & GYNECOLOGY

## 2024-03-01 RX ORDER — IBUPROFEN 600 MG/1
600 TABLET ORAL EVERY 6 HOURS PRN
Status: DISCONTINUED | OUTPATIENT
Start: 2024-03-01 | End: 2024-03-01 | Stop reason: HOSPADM

## 2024-03-01 RX ORDER — SCOLOPAMINE TRANSDERMAL SYSTEM 1 MG/1
1 PATCH, EXTENDED RELEASE TRANSDERMAL ONCE
Status: DISCONTINUED | OUTPATIENT
Start: 2024-03-01 | End: 2024-03-01 | Stop reason: HOSPADM

## 2024-03-01 RX ORDER — ONDANSETRON 2 MG/ML
4 INJECTION INTRAMUSCULAR; INTRAVENOUS ONCE
Status: DISCONTINUED | OUTPATIENT
Start: 2024-03-01 | End: 2024-03-01 | Stop reason: HOSPADM

## 2024-03-01 RX ORDER — ACETAMINOPHEN 500 MG
1000 TABLET ORAL EVERY 8 HOURS PRN
Qty: 60 TABLET | Refills: 0 | Status: SHIPPED | OUTPATIENT
Start: 2024-03-01 | End: 2025-03-01

## 2024-03-01 RX ORDER — SODIUM CHLORIDE 0.9 % (FLUSH) 0.9 %
10 SYRINGE (ML) INJECTION AS NEEDED
Status: DISCONTINUED | OUTPATIENT
Start: 2024-03-01 | End: 2024-03-01 | Stop reason: HOSPADM

## 2024-03-01 RX ORDER — MIDAZOLAM HYDROCHLORIDE 2 MG/2ML
INJECTION, SOLUTION INTRAMUSCULAR; INTRAVENOUS AS NEEDED
Status: DISCONTINUED | OUTPATIENT
Start: 2024-03-01 | End: 2024-03-01 | Stop reason: SURG

## 2024-03-01 RX ORDER — PROPOFOL 10 MG/ML
VIAL (ML) INTRAVENOUS AS NEEDED
Status: DISCONTINUED | OUTPATIENT
Start: 2024-03-01 | End: 2024-03-01 | Stop reason: SURG

## 2024-03-01 RX ORDER — IPRATROPIUM BROMIDE AND ALBUTEROL SULFATE 2.5; .5 MG/3ML; MG/3ML
3 SOLUTION RESPIRATORY (INHALATION) ONCE
Status: DISCONTINUED | OUTPATIENT
Start: 2024-03-01 | End: 2024-03-01 | Stop reason: HOSPADM

## 2024-03-01 RX ORDER — KETAMINE HCL IN NACL, ISO-OSM 100MG/10ML
SYRINGE (ML) INJECTION AS NEEDED
Status: DISCONTINUED | OUTPATIENT
Start: 2024-03-01 | End: 2024-03-01 | Stop reason: SURG

## 2024-03-01 RX ORDER — SODIUM CHLORIDE 0.9 % (FLUSH) 0.9 %
3 SYRINGE (ML) INJECTION EVERY 12 HOURS SCHEDULED
Status: DISCONTINUED | OUTPATIENT
Start: 2024-03-01 | End: 2024-03-01 | Stop reason: HOSPADM

## 2024-03-01 RX ORDER — ONDANSETRON 2 MG/ML
INJECTION INTRAMUSCULAR; INTRAVENOUS AS NEEDED
Status: DISCONTINUED | OUTPATIENT
Start: 2024-03-01 | End: 2024-03-01 | Stop reason: SURG

## 2024-03-01 RX ORDER — HYDROCODONE BITARTRATE AND ACETAMINOPHEN 5; 325 MG/1; MG/1
1 TABLET ORAL ONCE AS NEEDED
Status: DISCONTINUED | OUTPATIENT
Start: 2024-03-01 | End: 2024-03-01 | Stop reason: HOSPADM

## 2024-03-01 RX ORDER — LORAZEPAM 2 MG/ML
1 INJECTION INTRAMUSCULAR ONCE
Status: DISCONTINUED | OUTPATIENT
Start: 2024-03-01 | End: 2024-03-01 | Stop reason: HOSPADM

## 2024-03-01 RX ORDER — SODIUM CHLORIDE 9 MG/ML
INJECTION, SOLUTION INTRAVENOUS CONTINUOUS PRN
Status: COMPLETED | OUTPATIENT
Start: 2024-03-01 | End: 2024-03-01

## 2024-03-01 RX ORDER — HALOPERIDOL 5 MG/ML
INJECTION INTRAMUSCULAR AS NEEDED
Status: DISCONTINUED | OUTPATIENT
Start: 2024-03-01 | End: 2024-03-01 | Stop reason: SURG

## 2024-03-01 RX ORDER — DEXAMETHASONE SODIUM PHOSPHATE 4 MG/ML
INJECTION, SOLUTION INTRA-ARTICULAR; INTRALESIONAL; INTRAMUSCULAR; INTRAVENOUS; SOFT TISSUE AS NEEDED
Status: DISCONTINUED | OUTPATIENT
Start: 2024-03-01 | End: 2024-03-01 | Stop reason: SURG

## 2024-03-01 RX ORDER — SULFAMETHOXAZOLE AND TRIMETHOPRIM 800; 160 MG/1; MG/1
1 TABLET ORAL 2 TIMES DAILY
Qty: 6 TABLET | Refills: 0 | Status: SHIPPED | OUTPATIENT
Start: 2024-03-01 | End: 2024-03-04

## 2024-03-01 RX ORDER — DIPHENHYDRAMINE HYDROCHLORIDE 50 MG/ML
INJECTION INTRAMUSCULAR; INTRAVENOUS AS NEEDED
Status: DISCONTINUED | OUTPATIENT
Start: 2024-03-01 | End: 2024-03-01 | Stop reason: SURG

## 2024-03-01 RX ORDER — FENTANYL CITRATE 50 UG/ML
INJECTION, SOLUTION INTRAMUSCULAR; INTRAVENOUS AS NEEDED
Status: DISCONTINUED | OUTPATIENT
Start: 2024-03-01 | End: 2024-03-01 | Stop reason: SURG

## 2024-03-01 RX ORDER — SODIUM CHLORIDE, SODIUM LACTATE, POTASSIUM CHLORIDE, CALCIUM CHLORIDE 600; 310; 30; 20 MG/100ML; MG/100ML; MG/100ML; MG/100ML
1000 INJECTION, SOLUTION INTRAVENOUS CONTINUOUS
Status: DISCONTINUED | OUTPATIENT
Start: 2024-03-01 | End: 2024-03-01 | Stop reason: HOSPADM

## 2024-03-01 RX ORDER — MAGNESIUM HYDROXIDE 1200 MG/15ML
LIQUID ORAL AS NEEDED
Status: DISCONTINUED | OUTPATIENT
Start: 2024-03-01 | End: 2024-03-01 | Stop reason: HOSPADM

## 2024-03-01 RX ORDER — SODIUM CHLORIDE 9 MG/ML
40 INJECTION, SOLUTION INTRAVENOUS AS NEEDED
Status: DISCONTINUED | OUTPATIENT
Start: 2024-03-01 | End: 2024-03-01 | Stop reason: HOSPADM

## 2024-03-01 RX ORDER — METOCLOPRAMIDE HYDROCHLORIDE 5 MG/ML
INJECTION INTRAMUSCULAR; INTRAVENOUS AS NEEDED
Status: DISCONTINUED | OUTPATIENT
Start: 2024-03-01 | End: 2024-03-01 | Stop reason: SURG

## 2024-03-01 RX ORDER — LIDOCAINE HYDROCHLORIDE AND EPINEPHRINE 10; 10 MG/ML; UG/ML
INJECTION, SOLUTION INFILTRATION; PERINEURAL AS NEEDED
Status: DISCONTINUED | OUTPATIENT
Start: 2024-03-01 | End: 2024-03-01 | Stop reason: HOSPADM

## 2024-03-01 RX ORDER — IBUPROFEN 800 MG/1
800 TABLET ORAL EVERY 8 HOURS PRN
Qty: 30 TABLET | Refills: 0 | Status: SHIPPED | OUTPATIENT
Start: 2024-03-01

## 2024-03-01 RX ORDER — MEPERIDINE HYDROCHLORIDE 25 MG/ML
50 INJECTION INTRAMUSCULAR; INTRAVENOUS; SUBCUTANEOUS ONCE AS NEEDED
Status: DISCONTINUED | OUTPATIENT
Start: 2024-03-01 | End: 2024-03-01 | Stop reason: HOSPADM

## 2024-03-01 RX ORDER — CEFTRIAXONE 2 G/50ML
2000 INJECTION, SOLUTION INTRAVENOUS ONCE
Status: DISCONTINUED | OUTPATIENT
Start: 2024-03-01 | End: 2024-03-01 | Stop reason: HOSPADM

## 2024-03-01 RX ORDER — IBUPROFEN 400 MG/1
400 TABLET ORAL EVERY 6 HOURS PRN
COMMUNITY
End: 2024-03-01 | Stop reason: HOSPADM

## 2024-03-01 RX ORDER — KETOROLAC TROMETHAMINE 30 MG/ML
INJECTION, SOLUTION INTRAMUSCULAR; INTRAVENOUS AS NEEDED
Status: DISCONTINUED | OUTPATIENT
Start: 2024-03-01 | End: 2024-03-01 | Stop reason: SURG

## 2024-03-01 RX ORDER — SODIUM CHLORIDE, SODIUM LACTATE, POTASSIUM CHLORIDE, CALCIUM CHLORIDE 600; 310; 30; 20 MG/100ML; MG/100ML; MG/100ML; MG/100ML
INJECTION, SOLUTION INTRAVENOUS CONTINUOUS PRN
Status: DISCONTINUED | OUTPATIENT
Start: 2024-03-01 | End: 2024-03-01 | Stop reason: SURG

## 2024-03-01 RX ADMIN — PROPOFOL 150 MG: 10 INJECTION, EMULSION INTRAVENOUS at 12:57

## 2024-03-01 RX ADMIN — SODIUM CHLORIDE, POTASSIUM CHLORIDE, SODIUM LACTATE AND CALCIUM CHLORIDE: 600; 310; 30; 20 INJECTION, SOLUTION INTRAVENOUS at 11:03

## 2024-03-01 RX ADMIN — KETOROLAC TROMETHAMINE 30 MG: 30 INJECTION, SOLUTION INTRAMUSCULAR at 12:55

## 2024-03-01 RX ADMIN — DEXAMETHASONE SODIUM PHOSPHATE 8 MG: 4 INJECTION, SOLUTION INTRA-ARTICULAR; INTRALESIONAL; INTRAMUSCULAR; INTRAVENOUS; SOFT TISSUE at 13:03

## 2024-03-01 RX ADMIN — ONDANSETRON 4 MG: 2 INJECTION INTRAMUSCULAR; INTRAVENOUS at 13:03

## 2024-03-01 RX ADMIN — PROPOFOL 180 MCG/KG/MIN: 10 INJECTION, EMULSION INTRAVENOUS at 12:18

## 2024-03-01 RX ADMIN — HALOPERIDOL LACTATE 0.5 MG: 5 INJECTION, SOLUTION INTRAMUSCULAR at 12:21

## 2024-03-01 RX ADMIN — METOCLOPRAMIDE 5 MG: 5 INJECTION, SOLUTION INTRAMUSCULAR; INTRAVENOUS at 12:17

## 2024-03-01 RX ADMIN — GLYCOPYRROLATE 0.2 MCG: 0.2 INJECTION, SOLUTION INTRAMUSCULAR; INTRAVENOUS at 12:19

## 2024-03-01 RX ADMIN — DIPHENHYDRAMINE HYDROCHLORIDE 50 MG: 50 INJECTION, SOLUTION INTRAMUSCULAR; INTRAVENOUS at 12:46

## 2024-03-01 RX ADMIN — SODIUM CHLORIDE, POTASSIUM CHLORIDE, SODIUM LACTATE AND CALCIUM CHLORIDE 1000 ML: 600; 310; 30; 20 INJECTION, SOLUTION INTRAVENOUS at 10:06

## 2024-03-01 RX ADMIN — Medication 25 MG: at 12:34

## 2024-03-01 RX ADMIN — MIDAZOLAM HYDROCHLORIDE 2 MG: 1 INJECTION, SOLUTION INTRAMUSCULAR; INTRAVENOUS at 12:16

## 2024-03-01 RX ADMIN — SCOPOLAMINE 1 PATCH: 1.5 PATCH, EXTENDED RELEASE TRANSDERMAL at 10:09

## 2024-03-01 RX ADMIN — Medication 25 MG: at 12:17

## 2024-03-01 RX ADMIN — HYDROMORPHONE HYDROCHLORIDE 0.5 MG: 1 INJECTION, SOLUTION INTRAMUSCULAR; INTRAVENOUS; SUBCUTANEOUS at 13:34

## 2024-03-01 RX ADMIN — FENTANYL CITRATE 50 MCG: 50 INJECTION, SOLUTION INTRAMUSCULAR; INTRAVENOUS at 12:17

## 2024-03-01 NOTE — ANESTHESIA PREPROCEDURE EVALUATION
Anesthesia Evaluation     Patient summary reviewed and Nursing notes reviewed   history of anesthetic complications:  PONV difficult airway  NPO Solid Status: > 8 hours  NPO Liquid Status: > 8 hours           Airway   Mallampati: III  TM distance: >3 FB  Neck ROM: full  Large neck circumference, Possible difficult intubation and Difficult intubation highly probable  Dental      Pulmonary    (+) a smoker Current, cigarettes, asthma,shortness of breath, sleep apnea, decreased breath sounds  Cardiovascular     ECG reviewed    (+) RAMÍREZ, PVD  Past MI: per ekg. CAD: inc risk obese, radha.      Neuro/Psych  (+) psychiatric history Anxiety and Depression  GI/Hepatic/Renal/Endo    (+) obesity, morbid obesity, GERD, liver disease fatty liver diseaseDiabetes: inc risk.    Musculoskeletal     (+) arthralgias, myalgias  Abdominal   (+) obese   Substance History   (+) drug use     OB/GYN          Other   arthritis,     ROS/Med Hx Other: Hx of substance use   2023 sr sa ant infarct                 Anesthesia Plan    ASA 3     MAC     (Risks and benefits discussed including risk of aspiration, recall and dental damage. All patient questions answered.    Will continue with plan of care.)    Anesthetic plan, risks, benefits, and alternatives have been provided, discussed and informed consent has been obtained with: patient.  Pre-procedure education provided    CODE STATUS:

## 2024-03-01 NOTE — ANESTHESIA PROCEDURE NOTES
Airway  Urgency: elective      General Information and Staff    Patient location during procedure: OR  CRNA/CAA: Tito Abdi CRNA    Indications and Patient Condition    Preoxygenated: yes      Final Airway Details  Final airway type: supraglottic airway      Successful airway: classic  Size 4     Number of attempts at approach: 1    Additional Comments  lma placed by standard fashion, cuff up with mov, bbs and expansion =, + etco, tolerated without adverse rx. Gauge in the green zone

## 2024-03-01 NOTE — NURSING NOTE
Called into OR room, spoke to Eliezer HOOK regarding UA results.  He was to speak with MD.  No new orders at this time - S Geoff RN

## 2024-03-01 NOTE — DISCHARGE INSTRUCTIONS
Pelvic Surgery  Home Care Instructions:  Dr. Kelvin Hinton      Thank you for choosing [facility]. Please let us know if you have questions or concerns or do not understand the information we give you. Always ask us to explain words or phrases you do not understand.    You have had pelvic surgery. Here are some basic guidelines to help you recover more quickly at home. Your doctor may give you more guidelines. If you have any questions after you go home, please call the numbers listed on the next page.    General Guidelines    1. You may resume normal daily activities.  2. Do not put anything in the vagina (no tampons or douching).    3.   Do not have sex until cleared by your physician.  4.   You may climb steps.  5. You may bathe or shower.  6. The only exercise you should do is walking. This is important for your recovery.  7. Do not drive while taking narcotics.  8. Take the medicines you were taking before surgery. Other medicines you need to take at home are:    Alternate Motrin and Tylenol around the clock. Take each every 8 hours in alternation so that you are getting one of the medications every 4 hours.      Metamucil + Colace daily to keep bowel movements soft        If you have questions about your medicines, let us know. Or, talk to your local pharmacist.    Surgery, lack of activity, pain medicines and iron pills tend to cause constipation. Take something every day to prevent constipation and straining.  Taking something like Metamucil® every day to increase fiber may prevent constipation. Follow the instructions on the container. If you need a gentle laxative, then something like Milk of Magnesia® or Correctol® work fairly well. You should not need to take laxatives often.    10. Call your doctor if you have:     a. Fever of 101 degrees or higher.  b. Heavy vaginal bleeding.  c. Worsening nausea or pain.  d. Other problems or concern.    If you have any questions or concerns about  your usual routines, please talk to the nurse or doctor.       To talk with your doctor at [facility], call:  [facility]  Phone: [phone]      We appreciate the opportunity you have given us to participate in your care.

## 2024-03-01 NOTE — ANESTHESIA POSTPROCEDURE EVALUATION
Patient: Nery Ibarra    Procedure Summary       Date: 03/01/24 Room / Location: [facility] OR  /  [facility] OR    Anesthesia Start: 1216 Anesthesia Stop:     Procedure: INTRAUTERINE DEVICE REMOVAL, HYSTEROSCOPY, DILATION AND CURETTAGE, ENDOMETRIAL ABLATION WITH CERENE Diagnosis:       Abnormal uterine bleeding (AUB)      Pelvic pain      Encounter for IUD removal      H/O tubal ligation      (Abnormal uterine bleeding (AUB) [N93.9])      (Pelvic pain [R10.2])      (Encounter for IUD removal [Z30.432])      (H/O tubal ligation [Z98.51])    Surgeons: Kelvin Hinton MD Provider: Tito Abdi CRNA    Anesthesia Type: MAC ASA Status: 3            Anesthesia Type: MAC    Vitals  Vitals Value Taken Time   /76 03/01/24 1328   Temp     Pulse 95 03/01/24 1333   Resp     SpO2 100 % 03/01/24 1333   Vitals shown include unfiled device data.        Post Anesthesia Care and Evaluation    Patient location during evaluation: PACU  Patient participation: complete - patient participated  Level of consciousness: awake  Pain score: 0  Pain management: adequate    Airway patency: patent  Anesthetic complications: No anesthetic complications  PONV Status: none  Cardiovascular status: acceptable  Respiratory status: acceptable, face mask and spontaneous ventilation  Hydration status: acceptable    Comments: See R.N. note for postop vital signs.vsss resp spont, reflexes intact, responsive, report given to pacu nurse

## 2024-03-01 NOTE — OP NOTE
Vishal Ibarra  : 1995  MRN: [id number]  CSN: [id number]  Date: 3/1/2024    Operative Report    Pre-op Diagnosis:  Abnormal uterine bleeding  Menorrhagia with irregular cycle  IUD in place  Pelvic pain and dysmenorrhea  Previous tubal ligation  BMI 39   Post-op Diagnosis:  Same   Procedure: IUD removal  Hysteroscopy  Dilation and curettage  Endometrial ablation with Sharri   Surgeon:  Surgical assistant: JENNIFER Horn was responsible for performing the following activities: Retraction and manipulation of hysteroscopic instruments  and their skilled assistance was necessary for the success of this case.     OR Staff: Circulator: Kelsi Mccoy RN  Endo Technician: Rosalba Santo PCT     Anesthesia: Sedation   Estimated Blood Loss: 5 mls   ABx: Rocephin 2 g   Specimens:  Endometrial curettings       Complications: None           Findings:   Normal external genitalia and vaginal vault. Normal-appearing cervix.  IUD strings visible. Cervical canal was normal in appearance. Anteverted uterus that sounded to 7.2 cm. Normal endometrium. Both tubal ostia were visualized. Cavity length of 4.2 cm.    Description of Procedure:   Following confirmation of informed consent, the patient was taken to the operating room where her anesthesia was obtained without difficulty. She was prepped and draped in the usual sterile fashion in the dorsal lithotomy position. A surgical timeout for safety was performed and agreed upon by all team members. A heavy-weighted speculum and Holliday retractor was placed into her vagina and the cervix was visualized. A paracervical block was performed using 1% lidocaine with epinephrine. The IUD strings were grasped with a ring forcep and the IUD was removed fully intact. A long Allis clamp was used to grasp the anterior lip of the cervix. Gentle traction was applied and the uterus was sounded to 7.2 cm using a uterine sound. The sound was removed and the cervix  was gently dilated with sterile dilators to allow for entrance of a diagnostic hysteroscope. The diagnostic hysteroscope was then gently advanced to the uterine fundus and the above findings were noted. Both ostia were visualized. The hysteroscope was then removed and a sharp curettage was performed using a non-serrated curette until a gritty texture was noted throughout. All endometrial curettings were sent to pathology. The Cerene device was then placed and the ablation was performed per the 's recommendations and the device promptings. At the end of the procedure, excellent hemostasis was noted and all instruments were removed from the vagina. All counts were correct per the OR staff. The patient was awakened and taken to the recovery room in stable condition.    Kelvin Hinton MD  Obstetrics and Gynecology  [facility]

## 2024-03-01 NOTE — H&P
GYNECOLOGY HISTORY AND PHYSICAL    CHIEF COMPLAINT: Scheduled surgery    DIAGNOSIS:  Abnormal uterine bleeding  Menorrhagia with irregular cycle  IUD in place  Pelvic pain and dysmenorrhea  Previous tubal ligation  BMI 39    ASSESSMENT/PLAN     28 y.o.  female who presents for scheduled IUD removal, hysteroscopy with possible MyoSure, dilation and curettage, endometrial ablation with Cerene .    - Proceed to the OR for scheduled surgery  - Risks for the procedure reviewed  - SCDs for DVT ppx  - Antibiotics: none    HISTORY OF PRESENT ILLNESS     28 y.o.  female who presents for the scheduled procedure listed above.    She has no complaints today and there are no interval changes to the history.    REVIEW OF SYSTEMS: A complete review of systems was performed and was specifically negative for headache, changes in vision, RUQ pain, shortness of breath, chest pain, lower extremity edema and dysuria.     HISTORY:  Obstetrical History:  OB History    Para Term  AB Living   1 1 1     1   SAB IAB Ectopic Molar Multiple Live Births             1      # Outcome Date GA Lbr Keven/2nd Weight Sex Delivery Anes PTL Lv   1 Term 10/03/19 38w6d  3374 g (7 lb 7 oz) F Vag-Vacuum EPI N SHEYLA      Complications: Other       Past Medical History:  Past Medical History:   Diagnosis Date    Anxiety     Arthritis     Asthma     no rescue inhaler    Depression     Gallstones     GERD (gastroesophageal reflux disease)     PCOS (polycystic ovarian syndrome)     PONV (postoperative nausea and vomiting)     Seasonal allergies     Tattoo     x2    Urogenital trichomoniasis     Treated last year    UTI (urinary tract infection)        Past Surgical History:  Past Surgical History:   Procedure Laterality Date    BILE DUCT STENT PLACEMENT  2020    DENTAL PROCEDURE      Age 4    DIAGNOSTIC LAPAROSCOPY N/A 2021    Procedure: DIAGNOSTIC LAPAROSCOPY;  Surgeon: Kelvin Hinton MD;  Location: [facility];  Service:  Obstetrics/Gynecology;  Laterality: N/A;    ENDOSCOPY      LAPAROSCOPIC CHOLECYSTECTOMY  03/2020    TUBAL COAGULATION LAPAROSCOPIC N/A 3/25/2022    Procedure: LAPAROSCOPIC BILATERAL TUBAL LIGATION, NEXPLANON REMOVAL. IUD INSERTION;  Surgeon: Kelvin Hinton MD;  Location: [facility];  Service: Obstetrics/Gynecology;  Laterality: N/A;       Social History:  Social History     Tobacco Use    Smoking status: Some Days     Packs/day: 0.25     Years: 2.00     Additional pack years: 0.00     Total pack years: 0.50     Types: Cigarettes     Passive exposure: Never    Smokeless tobacco: Never    Tobacco comments:     1 pack per week   Vaping Use    Vaping Use: Some days    Substances: THC    Devices: Pre-filled or refillable cartridge, Delta 8   Substance Use Topics    Alcohol use: Never    Drug use: Not Currently     Types: Marijuana     Comment: about twice weekly       Family History  Family History   Adopted: Yes   Problem Relation Age of Onset    Heart murmur Other         Allergies:   Allergies   Allergen Reactions    Dilaudid [Hydromorphone] Nausea And Vomiting    Flexeril [Cyclobenzaprine] Confusion       OBJECTIVE   VITALS:  Temp:  [98.2 °F (36.8 °C)] 98.2 °F (36.8 °C)  Heart Rate:  [80] 80  Resp:  [18] 18  BP: (122)/(77) 122/77    PHYSICAL EXAM:  GENERAL: NAD, alert  CHEST: No increased work of breathing, CTAB  CV: RRR, WWP  ABDOMEN: Soft, NTTP  EXTREMITIES:  Warm and well-perfused, nontender, nonedematous  NEURO: AAO x 3, CN II-XII grossly intact    No current facility-administered medications on file prior to encounter.     Current Outpatient Medications on File Prior to Encounter   Medication Sig Dispense Refill    ibuprofen (ADVIL,MOTRIN) 400 MG tablet Take 1 tablet by mouth Every 6 (Six) Hours As Needed for Mild Pain.      LORazepam (Ativan) 0.5 MG tablet Take 1 tablet by mouth Every 8 (Eight) Hours As Needed for Anxiety. 15 tablet 1    meloxicam (MOBIC) 15 MG tablet Take 1 tablet by mouth Daily. (Patient  "taking differently: Take 1 tablet by mouth Daily As Needed.) 30 tablet 2    levonorgestrel (Mirena, 52 MG,) 20 MCG/24HR IUD 1 each by Intrauterine route 1 (One) Time for 1 dose. 1 each 0       DIAGNOSTIC STUDIES:        Invalid input(s): \"LABALB\", \"UA\"    No results found for this or any previous visit (from the past 24 hour(s)).    Kelvin Hinton MD  Obstetrics and Gynecology  [facility]     "

## 2024-03-03 LAB — BACTERIA SPEC AEROBE CULT: ABNORMAL

## 2024-03-04 ENCOUNTER — TELEPHONE (OUTPATIENT)
Dept: FAMILY MEDICINE CLINIC | Facility: CLINIC | Age: 29
End: 2024-03-04

## 2024-03-04 DIAGNOSIS — F41.1 GAD (GENERALIZED ANXIETY DISORDER): ICD-10-CM

## 2024-03-04 DIAGNOSIS — F39 MOOD DISORDER: ICD-10-CM

## 2024-03-04 RX ORDER — OXCARBAZEPINE 150 MG/1
75 TABLET, FILM COATED ORAL NIGHTLY
Qty: 15 TABLET | Refills: 0 | Status: CANCELLED | OUTPATIENT
Start: 2024-03-04

## 2024-03-04 NOTE — TELEPHONE ENCOUNTER
Hub staff attempted to follow warm transfer process and was unsuccessful     Caller: Nery Ibarra    Relationship to patient: Self    Best call back number: [phone]    Patient is needing: MEDICATION REFILLED, SHE HAS CALLED SEVERAL TIMES TO GET THIS FILLED. SHE STATED SHE HAS BEEN OUT FOR A FEW DAYS AND FEELS LIKE SHE IS STARTING TO FEEL OUT OF CONTROL. I TRIED TO TRANSFER HER TO OFFICE HOWEVER THEY STATED THERE WASN'T ANYONE IN OFFICE THAT COULD SPEAK TO HER - SHE HUNG UP BEFORE I WAS ABLE TO GO BACK ON LINE WITH HER.

## 2024-03-04 NOTE — TELEPHONE ENCOUNTER
Caller: Nery Ibarra    Relationship: Self    Best call back number: [phone]    What was the call regarding: PATIENT IS FOLLOWING UP ON REFILL FOR OXCARBAZEPINE. PATIENT STATES THAT SHE HAS BEEN OUT SINCE THURSDAY AND NEEDS MEDICATION ASAP. PLEASE ADVISE

## 2024-03-05 DIAGNOSIS — F39 MOOD DISORDER: ICD-10-CM

## 2024-03-05 DIAGNOSIS — F41.1 GAD (GENERALIZED ANXIETY DISORDER): ICD-10-CM

## 2024-03-05 LAB — REF LAB TEST METHOD: NORMAL

## 2024-03-05 RX ORDER — OXCARBAZEPINE 150 MG/1
75 TABLET, FILM COATED ORAL NIGHTLY
Qty: 15 TABLET | Refills: 0 | OUTPATIENT
Start: 2024-03-05

## 2024-03-05 RX ORDER — OXCARBAZEPINE 150 MG/1
150 TABLET, FILM COATED ORAL NIGHTLY
Qty: 30 TABLET | Refills: 1 | Status: SHIPPED | OUTPATIENT
Start: 2024-03-05

## 2024-03-05 NOTE — TELEPHONE ENCOUNTER
PATIENT IS ASKING FOR A REFILL ON HER MEDICATION. SHE HAS BEEN OUT OF MEDS FOR SEVERAL DAYS. SHE ASKED FOR AN INCREASE DUE TO THE MEDS NOT WORKING AS GOOD. SEE BELOW FOR ADDIT INFO. THERE ARE ALSO SEVERAL OTHER ENCOUNTERS FOR THIS.

## 2024-03-08 ENCOUNTER — TELEPHONE (OUTPATIENT)
Dept: OBSTETRICS AND GYNECOLOGY | Facility: CLINIC | Age: 29
End: 2024-03-08

## 2024-03-08 NOTE — TELEPHONE ENCOUNTER
Caller: Nery Ibarra    Relationship to patient: Self    Best call back number: [phone]     Chief complaint: D&C HYST, 03/01/24    Type of visit: POST-OP    Requested date: NA    If rescheduling, when is the original appointment: 03/08/24-PATIENT DOES NOT WISH TO RESCHEDULE.     Additional notes:   PATIENT REQUESTED TO SEND MESSAGE IN REGARDS TO CANCELED POST OP APPT ON 03/08/24. PATIENT DOES NOT WISH TO RESCHEDULE. PER PT, FEELING FINE AND DOES NOT FEEL APPT IS NEEDED. NO BLEEDING, PAIN OR DISCOMFORT. REQUESTING A CALL BACK IF  HAS ANY QUESTIONS.

## 2024-04-05 ENCOUNTER — OFFICE VISIT (OUTPATIENT)
Dept: FAMILY MEDICINE CLINIC | Facility: CLINIC | Age: 29
End: 2024-04-05
Payer: COMMERCIAL

## 2024-04-05 VITALS
WEIGHT: 248 LBS | OXYGEN SATURATION: 96 % | HEIGHT: 67 IN | DIASTOLIC BLOOD PRESSURE: 85 MMHG | HEART RATE: 83 BPM | SYSTOLIC BLOOD PRESSURE: 120 MMHG | BODY MASS INDEX: 38.92 KG/M2

## 2024-04-05 DIAGNOSIS — F51.04 PSYCHOPHYSIOLOGICAL INSOMNIA: ICD-10-CM

## 2024-04-05 DIAGNOSIS — F41.1 GAD (GENERALIZED ANXIETY DISORDER): ICD-10-CM

## 2024-04-05 DIAGNOSIS — F33.1 MAJOR DEPRESSIVE DISORDER, RECURRENT EPISODE, MODERATE DEGREE: ICD-10-CM

## 2024-04-05 DIAGNOSIS — F39 MOOD DISORDER: Primary | ICD-10-CM

## 2024-04-05 DIAGNOSIS — R53.83 FATIGUE, UNSPECIFIED TYPE: ICD-10-CM

## 2024-04-05 PROCEDURE — 99214 OFFICE O/P EST MOD 30 MIN: CPT | Performed by: NURSE PRACTITIONER

## 2024-04-05 PROCEDURE — 1160F RVW MEDS BY RX/DR IN RCRD: CPT | Performed by: NURSE PRACTITIONER

## 2024-04-05 PROCEDURE — 1159F MED LIST DOCD IN RCRD: CPT | Performed by: NURSE PRACTITIONER

## 2024-04-05 RX ORDER — TRAZODONE HYDROCHLORIDE 50 MG/1
TABLET ORAL
Qty: 60 TABLET | Refills: 2 | Status: SHIPPED | OUTPATIENT
Start: 2024-04-05

## 2024-04-05 RX ORDER — OXCARBAZEPINE 150 MG/1
150 TABLET, FILM COATED ORAL 2 TIMES DAILY
Qty: 60 TABLET | Refills: 2 | Status: SHIPPED | OUTPATIENT
Start: 2024-04-05

## 2024-04-05 NOTE — PROGRESS NOTES
Subjective     Chief Complaint:    Chief Complaint   Patient presents with    Anxiety    Depression    PTSD     Would like med adjustment or something else added       History of Present Illness:   Patient presents today to follow-up on medications. Started oxcarbazepine in January, and notes that her intrusive thoughts and depressive episodes have gotten better since starting medication. She is still having a hard time doing daily tasks and leaving home. No suicidal ideations. Does take lorazepam when having panic attacks, but endorses that she is only having to take it a few times a month. Does vape daily, which she endorses helps with her anxiety symptoms as well as taking her mind away from chronic pain. Is not taking meloxicam anymore as it is not effect. She has been getting cortisone shots that have helped with pain. She feels like she has been having really high-highs and really low-lows with her moods, but since starting oxcarbazepine has noticed that she isn't having as many episodes and is more stable. Has noticed that a few hours before time to take medication, it has worn off and she starts to have an increase in her anxiety symptoms. She does have some dizziness, but it is tolerable and she doesn't feel like it is a problem. She does have a good support system, and feels that she is well supported.     Is not sleeping well at night due to chronic pain and PTSD. She sleeps in about 4 hour stretches, and then wakes up due to pain or a bad dream, and then is awake for a while. She states that she typically eats and has to vape, and then can go back to sleep. She says that her anxiety is worse at night time when she is trying to fall asleep. Has tried melatonin and has not been effective.     Has been having leg cramps in both legs and feet in the last month. Notes that it is more so her feet, and the sides and soles. Endorses that she is not drinking much water, but drinks iced coffee 2-3 times a  "day with her last one being around 4pm.       Review of Systems  Gen- No fevers, chills  CV- No chest pain, palpitations  Resp- No cough, dyspnea  GI- No N/V/D, abd pain  Neuro-No dizziness, headaches      I have reviewed and/or updated the patient's past medical, surgical, family, social history and problem list as appropriate.     Medications:    Current Outpatient Medications:     acetaminophen (TYLENOL) 500 MG tablet, Take 2 tablets by mouth Every 8 (Eight) Hours As Needed for Mild Pain., Disp: 60 tablet, Rfl: 0    LORazepam (Ativan) 0.5 MG tablet, Take 1 tablet by mouth Every 8 (Eight) Hours As Needed for Anxiety., Disp: 15 tablet, Rfl: 1    OXcarbazepine (Trileptal) 150 MG tablet, Take 1 tablet by mouth 2 (Two) Times a Day., Disp: 60 tablet, Rfl: 2    traZODone (DESYREL) 50 MG tablet, 1-2 po hs prn sleep, Disp: 60 tablet, Rfl: 2    Allergies:  Allergies   Allergen Reactions    Dilaudid [Hydromorphone] Nausea And Vomiting    Flexeril [Cyclobenzaprine] Confusion       Objective     Vital Signs:   Vitals:    04/05/24 1714   BP: 120/85   Pulse: 83   SpO2: 96%   Weight: 112 kg (248 lb)   Height: 170.2 cm (67\")     Body mass index is 38.84 kg/m².    PHQ-2/PHQ-9: Depression Screening     Little Interest or Pleasure in Doing Things 1-->several days   Feeling Down, Depressed or Hopeless 1-->several days   PHQ-2 Total Score 2   Trouble Falling or Staying Asleep, or Sleeping Too Much 3-->nearly every day   Feeling Tired or Having Little Energy 3-->nearly every day   Poor Appetite or Overeating 3-->nearly every day   Feeling Bad about Yourself - or that You are a Failure or Have Let Yourself or Your Family Down 3-->nearly every day   Trouble Concentrating on Things, Such as Reading the Newspaper or Watching Television 1-->several days   Moving or Speaking So Slowly that Other People Could Have Noticed? Or the Opposite - Being So Fidgety 3-->nearly every day   Thoughts that You Would be Better Off Dead or of Hurting " Yourself in Some Way 0-->not at all   PHQ-9: Brief Depression Severity Measure Score 18   If You Checked Off Any Problems, How Difficult Have These Problems Made It For You to Do Your Work, Take Care of Things at Home, or Get Along with Other People? somewhat difficult       Physical Exam:    Physical Exam  Constitutional:       Appearance: She is obese.   HENT:      Head: Normocephalic and atraumatic.   Cardiovascular:      Rate and Rhythm: Normal rate and regular rhythm.      Heart sounds: Normal heart sounds.   Pulmonary:      Effort: Pulmonary effort is normal.      Breath sounds: Normal breath sounds.   Abdominal:      General: Bowel sounds are normal.      Palpations: Abdomen is soft.   Musculoskeletal:      Cervical back: Neck supple.   Skin:     General: Skin is warm and dry.   Neurological:      General: No focal deficit present.      Mental Status: She is alert. Mental status is at baseline.   Psychiatric:         Attention and Perception: Attention normal.         Mood and Affect: Affect normal. Mood is anxious.         Behavior: Behavior normal.       Assessment / Plan     Assessment/Plan:   Problem List Items Addressed This Visit       Mood disorder - Primary    Relevant Medications    LORazepam (Ativan) 0.5 MG tablet    OXcarbazepine (Trileptal) 150 MG tablet    traZODone (DESYREL) 50 MG tablet    GIBRAN (generalized anxiety disorder)    Relevant Medications    LORazepam (Ativan) 0.5 MG tablet    OXcarbazepine (Trileptal) 150 MG tablet    traZODone (DESYREL) 50 MG tablet    Fatigue    Major depressive disorder, recurrent episode, moderate degree    Relevant Medications    LORazepam (Ativan) 0.5 MG tablet    traZODone (DESYREL) 50 MG tablet     Other Visit Diagnoses       Psychophysiological insomnia        Relevant Medications    traZODone (DESYREL) 50 MG tablet          -- increase trileptal, trial trazodone, continue ativan, keep f/u with behavior health    Discussed plan of care in detail with pt  today; pt verb understanding and agrees.    Follow up:    I, Ene GOMEZ, personally performed the services described in this documentation, as scribed by Meryl GOMEZ student in my presence, and is both accurate and complete   Electronically signed by JASON Mccord   04/05/2024 17:17 EDT      Please note that portions of this note were completed with a voice recognition program.

## 2024-04-18 ENCOUNTER — PRIOR AUTHORIZATION (OUTPATIENT)
Dept: FAMILY MEDICINE CLINIC | Facility: CLINIC | Age: 29
End: 2024-04-18
Payer: COMMERCIAL

## 2024-04-18 NOTE — TELEPHONE ENCOUNTER
(Key: [id number])    Member should be able to get the drug/product without a PA at this time.    Pastora Mcduffie CMA        PA sent to insurance for Oxcarbazepine.   Waiting for reply.    Pastora Mcduffie CMA

## 2024-04-30 ENCOUNTER — OFFICE VISIT (OUTPATIENT)
Dept: OBSTETRICS AND GYNECOLOGY | Facility: CLINIC | Age: 29
End: 2024-04-30
Payer: COMMERCIAL

## 2024-04-30 VITALS
DIASTOLIC BLOOD PRESSURE: 82 MMHG | WEIGHT: 245 LBS | HEIGHT: 67 IN | SYSTOLIC BLOOD PRESSURE: 132 MMHG | BODY MASS INDEX: 38.45 KG/M2

## 2024-04-30 DIAGNOSIS — Z98.51 H/O TUBAL LIGATION: ICD-10-CM

## 2024-04-30 DIAGNOSIS — N93.9 ABNORMAL UTERINE BLEEDING (AUB): Primary | ICD-10-CM

## 2024-04-30 DIAGNOSIS — Z98.890 S/P ENDOMETRIAL ABLATION: ICD-10-CM

## 2024-04-30 PROCEDURE — 99213 OFFICE O/P EST LOW 20 MIN: CPT | Performed by: OBSTETRICS & GYNECOLOGY

## 2024-05-01 ENCOUNTER — TELEPHONE (OUTPATIENT)
Dept: OBSTETRICS AND GYNECOLOGY | Facility: CLINIC | Age: 29
End: 2024-05-01

## 2024-05-01 NOTE — TELEPHONE ENCOUNTER
Caller: Nery Ibarra    Relationship: Self    Best call back number: [phone]    Requested Prescriptions:   norethindrone (Aygestin) 5 MG tablet      Pharmacy where request should be sent:    [facility] [address] [place] [address] [phone] [facility] [phone] FX [[id number]]   Last office visit with prescribing clinician: 4/30/2024   Last telemedicine visit with prescribing clinician: Visit date not found   Next office visit with prescribing clinician: 8/1/2024     Additional details provided by patient:     Does the patient have less than a 3 day supply:  [x] Yes  [] No    Would you like a call back once the refill request has been completed: [x] Yes [] No    If the office needs to give you a call back, can they leave a voicemail: [] Yes [] No    PATIENT WOULD LIKE MEDICATION TO BE ROUTED TO [facility] OF THE [facility] IN [place] PLEASE. MEDICATION WAS SENT TO THE ONE IN [place].    Fady Cabrera Rep   05/01/24 13:02 EDT

## 2024-05-16 ENCOUNTER — TELEPHONE (OUTPATIENT)
Dept: OBSTETRICS AND GYNECOLOGY | Facility: CLINIC | Age: 29
End: 2024-05-16
Payer: COMMERCIAL

## 2024-05-16 NOTE — TELEPHONE ENCOUNTER
Patient called and advised for past 3 days she has had extremely heavy bleeding and is in severe pain with cramping in lower abdomen and back, is still taking the Aygestin but isn't helping wants to know what she can do? Thanks.

## 2024-05-18 ENCOUNTER — HOSPITAL ENCOUNTER (EMERGENCY)
Facility: HOSPITAL | Age: 29
Discharge: HOME | End: 2024-05-18
Attending: STUDENT IN AN ORGANIZED HEALTH CARE EDUCATION/TRAINING PROGRAM
Payer: COMMERCIAL

## 2024-05-18 VITALS
TEMPERATURE: 98.5 F | RESPIRATION RATE: 18 BRPM | OXYGEN SATURATION: 98 % | BODY MASS INDEX: 39.24 KG/M2 | HEIGHT: 67 IN | HEART RATE: 63 BPM | WEIGHT: 250 LBS | DIASTOLIC BLOOD PRESSURE: 78 MMHG | SYSTOLIC BLOOD PRESSURE: 137 MMHG

## 2024-05-18 DIAGNOSIS — J02.9 SORE THROAT: ICD-10-CM

## 2024-05-18 DIAGNOSIS — H92.01 RIGHT EAR PAIN: Primary | ICD-10-CM

## 2024-05-18 LAB — S PYO AG THROAT QL: NEGATIVE

## 2024-05-18 PROCEDURE — 87081 CULTURE SCREEN ONLY: CPT

## 2024-05-18 PROCEDURE — 99283 EMERGENCY DEPT VISIT LOW MDM: CPT

## 2024-05-18 PROCEDURE — 87880 STREP A ASSAY W/OPTIC: CPT

## 2024-05-18 RX ORDER — FLUTICASONE PROPIONATE 50 MCG
2 SPRAY, SUSPENSION (ML) NASAL DAILY
Qty: 15.8 ML | Refills: 0 | Status: SHIPPED | OUTPATIENT
Start: 2024-05-18

## 2024-05-18 NOTE — Clinical Note
[facility] EMERGENCY DEPARTMENT  [address] [address]  Phone: [phone]    Nery Ibarra was seen and treated in our emergency department on 5/18/2024.  She may return to work on 05/19/2024.         Thank you for choosing [facility].    Danilo Lawrence PA-C

## 2024-05-18 NOTE — ED PROVIDER NOTES
EMERGENCY DEPARTMENT ENCOUNTER    Pt Name: Nery Ibarra  MRN: [id number]  Pt :   1995  Room Number:  24SF/24  Date of encounter:  2024  PCP: Ene Murry APRN  ED Provider: Danilo Lawrence PA-C    Historian: Patient      HPI:  Chief Complaint   Patient presents with    Earache    Sore Throat          Context: Nery Ibarra is a 28 y.o. female who presents to the ED c/o right ear pain and sore throat.  Awoke this morning with sharp pain in her right ear and scratchy throat.  No fever or chills.  No dental pain.  No cough.  No sinus congestion.  No rhinorrhea.  Has not been swimming recently,      PAST MEDICAL HISTORY  Past Medical History:   Diagnosis Date    Anxiety     Arthritis     Asthma     no rescue inhaler    Depression     Gallstones     GERD (gastroesophageal reflux disease)     PCOS (polycystic ovarian syndrome)     PONV (postoperative nausea and vomiting)     Seasonal allergies     Tattoo     x2    Urogenital trichomoniasis     Treated last year    UTI (urinary tract infection)          PAST SURGICAL HISTORY  Past Surgical History:   Procedure Laterality Date    BILE DUCT STENT PLACEMENT  2020    DENTAL PROCEDURE      Age 4    DIAGNOSTIC LAPAROSCOPY N/A 2021    Procedure: DIAGNOSTIC LAPAROSCOPY;  Surgeon: Kelvin Hinton MD;  Location: [facility] OR;  Service: Obstetrics/Gynecology;  Laterality: N/A;    ENDOMETRIAL ABLATION      ENDOSCOPY      LAPAROSCOPIC CHOLECYSTECTOMY  2020    TUBAL COAGULATION LAPAROSCOPIC N/A 2022    Procedure: LAPAROSCOPIC BILATERAL TUBAL LIGATION, NEXPLANON REMOVAL. IUD INSERTION;  Surgeon: Kelvin Hinton MD;  Location: [facility];  Service: Obstetrics/Gynecology;  Laterality: N/A;         FAMILY HISTORY  Family History   Adopted: Yes   Problem Relation Age of Onset    Heart murmur Other          SOCIAL HISTORY  Social History     Socioeconomic History    Marital status: Single   Tobacco Use    Smoking status: Some Days      Current packs/day: 0.25     Average packs/day: 0.3 packs/day for 2.0 years (0.5 ttl pk-yrs)     Types: Cigarettes     Passive exposure: Never    Smokeless tobacco: Never    Tobacco comments:     1 pack per week   Vaping Use    Vaping status: Some Days    Substances: THC    Devices: Pre-filled or refillable cartridge, Delta 8   Substance and Sexual Activity    Alcohol use: Never    Drug use: Not Currently     Types: Marijuana     Comment: about twice weekly    Sexual activity: Defer     Birth control/protection: I.U.D.     Comment: IUD         ALLERGIES  Dilaudid [hydromorphone] and Flexeril [cyclobenzaprine]        REVIEW OF SYSTEMS  Review of Systems   Constitutional: Negative.    HENT:  Positive for ear pain and sore throat.    Eyes: Negative.    Respiratory: Negative.     Cardiovascular: Negative.    Gastrointestinal: Negative.    Genitourinary: Negative.    Musculoskeletal: Negative.    Skin: Negative.    Neurological: Negative.    Psychiatric/Behavioral: Negative.          All systems reviewed and negative except for those discussed in HPI.       PHYSICAL EXAM    I have reviewed the triage vital signs and nursing notes.    ED Triage Vitals   Temp Heart Rate Resp BP SpO2   05/18/24 1208 05/18/24 1208 05/18/24 1208 05/18/24 1209 05/18/24 1208   98.5 °F (36.9 °C) 63 18 137/78 98 %      Temp src Heart Rate Source Patient Position BP Location FiO2 (%)   -- -- -- -- --              Physical Exam  Vitals and nursing note reviewed.   Constitutional:       General: She is not in acute distress.     Appearance: She is well-developed. She is obese. She is not ill-appearing, toxic-appearing or diaphoretic.   HENT:      Head: Normocephalic and atraumatic.      Right Ear: Tympanic membrane and ear canal normal. No drainage, swelling or tenderness. No middle ear effusion. Tympanic membrane is not erythematous.      Left Ear: Tympanic membrane and ear canal normal. No drainage, swelling or tenderness.  No middle ear effusion.  Tympanic membrane is not erythematous.      Mouth/Throat:      Mouth: Mucous membranes are moist. No oral lesions.      Pharynx: Oropharynx is clear. Uvula midline. No pharyngeal swelling, oropharyngeal exudate, posterior oropharyngeal erythema or uvula swelling.      Tonsils: No tonsillar exudate or tonsillar abscesses. 1+ on the right. 1+ on the left.   Cardiovascular:      Rate and Rhythm: Normal rate and regular rhythm.   Pulmonary:      Effort: Pulmonary effort is normal. No respiratory distress.      Breath sounds: Normal breath sounds. No stridor. No wheezing, rhonchi or rales.   Musculoskeletal:      Cervical back: Normal range of motion and neck supple.   Skin:     General: Skin is warm and dry.   Neurological:      General: No focal deficit present.      Mental Status: She is alert.   Psychiatric:         Mood and Affect: Mood normal.         Behavior: Behavior normal.            LAB RESULTS  Recent Results (from the past 24 hour(s))   Rapid Strep A Screen - Swab, Throat    Collection Time: 05/18/24 12:14 PM    Specimen: Throat; Swab   Result Value Ref Range    Strep A Ag Negative Negative       If labs were ordered, I independently reviewed the results and considered them in treating the patient.        RADIOLOGY  No Radiology Exams Resulted Within Past 24 Hours        PROCEDURES    Procedures    Interpretations    O2 Sat: The patients oxygen saturation was 98% on Room Air.  This was independently interpreted by me as Normal    MEDICATIONS GIVEN IN ER    Medications - No data to display      MEDICAL DECISION MAKING, PROGRESS, and CONSULTS    All labs, if obtained, have been independently reviewed by me.  All radiology studies, if obtained, have been reviewed by me and the radiologist dictating the report.  All EKG's, if obtained, have been independently viewed and interpreted by me      Discussion below represents my analysis of pertinent findings related to patient's condition, differential diagnosis,  treatment plan and final disposition.      Differential diagnosis:    Strep throat, otitis media, viral illness      Orders placed during this visit:  Orders Placed This Encounter   Procedures    Rapid Strep A Screen - Swab, Throat    Beta Strep Culture, Throat - Swab, Throat     Rapid strep negative.  Signs are stable.  No signs of otitis media or externa on exam.  No mastoid tenderness.  Posterior pharynx normal no peritonsillar abscess, uvula midline, airways patent.  Suspect viral illness or possible eustachian tube dysfunction.  Will give Flonase and have her follow-up outpatient return for worse.    Additional orders considered but not ordered:  Labs: Viral PCR swab    ED Course:      ED Course as of 05/18/24 1242   Sat May 18, 2024   1236 Strep A Ag: Negative [TM]      ED Course User Index  [TM] Danilo Lawrence PA-C           After my consideration of clinical presentation and any laboratory/radiology studies obtained, I discussed the findings with the patient/patient representative who is in agreement with the treatment plan and the final disposition. Risks and benefits of discharge were discussed.     AS OF 12:42 EDT VITALS:    BP - 137/78  HR - 63  TEMP - 98.5 °F (36.9 °C)  O2 SATS - 98%    I reviewed the patients prescription monitoring report if available prior to discharge    DIAGNOSIS  Final diagnoses:   Right ear pain   Sore throat         DISPOSITION  ED Disposition       ED Disposition   Discharge    Condition   Stable    Comment   --                   Please note that portions of this document were completed with voice recognition software.        Danilo Lawrence PA-C  05/18/24 1242

## 2024-05-21 LAB — BACTERIA SPEC AEROBE CULT: NORMAL

## 2024-06-29 DIAGNOSIS — F51.04 PSYCHOPHYSIOLOGICAL INSOMNIA: ICD-10-CM

## 2024-07-02 RX ORDER — TRAZODONE HYDROCHLORIDE 50 MG/1
TABLET ORAL
Qty: 180 TABLET | Refills: 0 | Status: SHIPPED | OUTPATIENT
Start: 2024-07-02

## 2024-08-01 ENCOUNTER — PREP FOR SURGERY (OUTPATIENT)
Dept: OTHER | Facility: HOSPITAL | Age: 29
End: 2024-08-01
Payer: MEDICAID

## 2024-08-01 ENCOUNTER — OFFICE VISIT (OUTPATIENT)
Dept: OBSTETRICS AND GYNECOLOGY | Facility: CLINIC | Age: 29
End: 2024-08-01
Payer: MEDICAID

## 2024-08-01 VITALS
SYSTOLIC BLOOD PRESSURE: 136 MMHG | HEIGHT: 67 IN | BODY MASS INDEX: 37.51 KG/M2 | WEIGHT: 239 LBS | DIASTOLIC BLOOD PRESSURE: 80 MMHG

## 2024-08-01 DIAGNOSIS — R10.2 CHRONIC PELVIC PAIN IN FEMALE: ICD-10-CM

## 2024-08-01 DIAGNOSIS — N94.6 DYSMENORRHEA: ICD-10-CM

## 2024-08-01 DIAGNOSIS — Z98.890 HISTORY OF ENDOMETRIAL ABLATION: ICD-10-CM

## 2024-08-01 DIAGNOSIS — G89.29 CHRONIC PELVIC PAIN IN FEMALE: ICD-10-CM

## 2024-08-01 DIAGNOSIS — Z98.890 S/P ENDOMETRIAL ABLATION: ICD-10-CM

## 2024-08-01 DIAGNOSIS — Z12.4 SCREENING FOR MALIGNANT NEOPLASM OF CERVIX: ICD-10-CM

## 2024-08-01 DIAGNOSIS — Z98.51 H/O TUBAL LIGATION: ICD-10-CM

## 2024-08-01 DIAGNOSIS — N93.9 ABNORMAL UTERINE BLEEDING (AUB): Primary | ICD-10-CM

## 2024-08-01 PROCEDURE — 99214 OFFICE O/P EST MOD 30 MIN: CPT | Performed by: OBSTETRICS & GYNECOLOGY

## 2024-08-01 RX ORDER — PHENAZOPYRIDINE HYDROCHLORIDE 100 MG/1
200 TABLET, FILM COATED ORAL ONCE
OUTPATIENT
Start: 2024-08-01 | End: 2024-08-01

## 2024-08-01 RX ORDER — SODIUM CHLORIDE 0.9 % (FLUSH) 0.9 %
10 SYRINGE (ML) INJECTION AS NEEDED
OUTPATIENT
Start: 2024-08-01

## 2024-08-01 RX ORDER — SODIUM CHLORIDE 9 MG/ML
40 INJECTION, SOLUTION INTRAVENOUS AS NEEDED
OUTPATIENT
Start: 2024-08-01

## 2024-08-01 RX ORDER — SODIUM CHLORIDE 0.9 % (FLUSH) 0.9 %
10 SYRINGE (ML) INJECTION EVERY 12 HOURS SCHEDULED
OUTPATIENT
Start: 2024-08-01

## 2024-08-03 LAB
A VAGINAE DNA VAG QL NAA+PROBE: ABNORMAL SCORE
BVAB2 DNA VAG QL NAA+PROBE: ABNORMAL SCORE
C ALBICANS DNA VAG QL NAA+PROBE: NEGATIVE
C GLABRATA DNA VAG QL NAA+PROBE: NEGATIVE
C TRACH DNA SPEC QL NAA+PROBE: NEGATIVE
MEGA1 DNA VAG QL NAA+PROBE: ABNORMAL SCORE
N GONORRHOEA DNA VAG QL NAA+PROBE: NEGATIVE
T VAGINALIS DNA VAG QL NAA+PROBE: NEGATIVE

## 2024-08-05 ENCOUNTER — TELEPHONE (OUTPATIENT)
Dept: OBSTETRICS AND GYNECOLOGY | Facility: CLINIC | Age: 29
End: 2024-08-05

## 2024-08-05 DIAGNOSIS — N76.0 BV (BACTERIAL VAGINOSIS): Primary | ICD-10-CM

## 2024-08-05 DIAGNOSIS — B96.89 BV (BACTERIAL VAGINOSIS): Primary | ICD-10-CM

## 2024-08-05 RX ORDER — METRONIDAZOLE 500 MG/1
500 TABLET ORAL 2 TIMES DAILY
Qty: 14 TABLET | Refills: 0 | Status: SHIPPED | OUTPATIENT
Start: 2024-08-05 | End: 2024-08-12

## 2024-08-05 NOTE — TELEPHONE ENCOUNTER
Caller: Nery Ibarra    Relationship: Self    Best call back number: [phone] ANYTIME AFTER 11AM IS BEST    Caller requesting test results: PAP SMEAR (IS HAVING SURGERY ON 8-28 FYI)    What test was performed: PAP SMEAR    When was the test performed: 08-01    Where was the test performed: OFFICE    Additional notes: SHE RECD RESULTS IN HER MYCHART BUT CANNOT PULL IT UP

## 2024-08-07 ENCOUNTER — TELEPHONE (OUTPATIENT)
Dept: OBSTETRICS AND GYNECOLOGY | Facility: CLINIC | Age: 29
End: 2024-08-07

## 2024-08-07 DIAGNOSIS — N93.9 ABNORMAL UTERINE BLEEDING (AUB): ICD-10-CM

## 2024-08-07 RX ORDER — NORETHINDRONE ACETATE 5 MG
10 TABLET ORAL DAILY
Qty: 60 TABLET | Refills: 0 | Status: CANCELLED | OUTPATIENT
Start: 2024-08-07

## 2024-08-07 NOTE — TELEPHONE ENCOUNTER
Caller: Nery Ibarra    Relationship: Self    Best call back number: [phone]    Requested Prescriptions:   Requested Prescriptions     Pending Prescriptions Disp Refills    norethindrone (Aygestin) 5 MG tablet 60 tablet 5     Sig: Take 2 tablets by mouth Daily.        Pharmacy where request should be sent: [facility] [address] [place] [address] [phone] [facility] [phone] FX     Last office visit with prescribing clinician: 8/1/2024     Next office visit with prescribing clinician: 9/5/2024     Additional details provided by patient: PATIENT STATED THAT SHE HAS BEEN HAVING VERY HEAVY BLEEDING SINCE LAST TUESDAY AND WOULD LIKE TO KNOW IF SHE CAN GET A REFILL OF THE MEDICATION SHE WAS GIVEN LAST TIME TO HELP WITH HEAVY BLEEDING (THINKS IT WAS THE NORETHINDRONE)     PATIENT STATED SHE IS HAVING A HYSTERECTOMY SOON AND JUST WANTS THE MEDICATION TO HELP CONTROL THE BLEEDING UNTIL HER SURGERY

## 2024-08-08 ENCOUNTER — TELEPHONE (OUTPATIENT)
Dept: OBSTETRICS AND GYNECOLOGY | Facility: CLINIC | Age: 29
End: 2024-08-08
Payer: MEDICAID

## 2024-08-08 DIAGNOSIS — N93.9 ABNORMAL UTERINE BLEEDING (AUB): ICD-10-CM

## 2024-08-08 LAB — REF LAB TEST METHOD: NORMAL

## 2024-08-08 NOTE — TELEPHONE ENCOUNTER
Hub staff attempted to follow warm transfer process and was unsuccessful     Caller: Nery Ibarra    Relationship to patient: Self    Best call back number: [phone] (home)       Patient is needing: NEEDING MEDICATION (norethindrone (Aygestin) 5 MG tablet) SENT TO [facility] [address] [place] [address] [phone] [facility] [phone] FX. ASAP. PRESCRIPTION WAS SENT TO [facility]. PLEASE CALL PT BACK WHEN COMPLETE.

## 2024-08-08 NOTE — PROGRESS NOTES
GYN Office Visit    Subjective   Chief Complaint   Patient presents with    Follow-up     3 month follow up AUB- states she is still bleeding and cramping      Nery Ibarra is a 28 y.o. year old  presenting for follow-up bleeding and pain.    She has ongoing problems with pelvic pain and vaginal bleeding.  Bleeding is heavy and pain is debilitating.  She underwent a Cerene endometrial ablation in March of this year.  She previously failed an IUD and Nexplanon.  She has also undergone laparoscopy x 2 and hysteroscopy x 1.  Previous tubal ligation.    OB Hx:  x 1  Pap smear:     Past Medical History:   Diagnosis Date    Anxiety     Arthritis     Asthma     no rescue inhaler    Depression     Gallstones     GERD (gastroesophageal reflux disease)     PCOS (polycystic ovarian syndrome)     PONV (postoperative nausea and vomiting)     Seasonal allergies     Tattoo     x2    Urogenital trichomoniasis     Treated last year    UTI (urinary tract infection)        Past Surgical History:   Procedure Laterality Date    BILE DUCT STENT PLACEMENT  2020    DENTAL PROCEDURE      Age 4    DIAGNOSTIC LAPAROSCOPY N/A 2021    Procedure: DIAGNOSTIC LAPAROSCOPY;  Surgeon: Kelvin Hinton MD;  Location: [facility];  Service: Obstetrics/Gynecology;  Laterality: N/A;    ENDOMETRIAL ABLATION      ENDOSCOPY      LAPAROSCOPIC CHOLECYSTECTOMY  2020    TUBAL COAGULATION LAPAROSCOPIC N/A 2022    Procedure: LAPAROSCOPIC BILATERAL TUBAL LIGATION, NEXPLANON REMOVAL. IUD INSERTION;  Surgeon: Kelvin Hinton MD;  Location: [facility];  Service: Obstetrics/Gynecology;  Laterality: N/A;       Family History   Adopted: Yes   Problem Relation Age of Onset    Heart murmur Other         Social History     Tobacco Use    Smoking status: Some Days     Current packs/day: 0.25     Average packs/day: 0.3 packs/day for 2.0 years (0.5 ttl pk-yrs)     Types: Cigarettes     Passive exposure: Never    Smokeless tobacco:  "Never    Tobacco comments:     1 pack per week   Vaping Use    Vaping status: Some Days    Substances: THC    Devices: Pre-filled or refillable cartridge, Delta 8   Substance Use Topics    Alcohol use: Never    Drug use: Not Currently     Types: Marijuana     Comment: about twice weekly       (Not in a hospital admission)      Dilaudid [hydromorphone] and Flexeril [cyclobenzaprine]    Current Outpatient Medications on File Prior to Visit   Medication Sig Dispense Refill    acetaminophen (TYLENOL) 500 MG tablet Take 2 tablets by mouth Every 8 (Eight) Hours As Needed for Mild Pain. 60 tablet 0    fluticasone (FLONASE) 50 MCG/ACT nasal spray 2 sprays into the nostril(s) as directed by provider Daily. 15.8 mL 0    LORazepam (Ativan) 0.5 MG tablet Take 1 tablet by mouth Every 8 (Eight) Hours As Needed for Anxiety. 15 tablet 1    OXcarbazepine (Trileptal) 150 MG tablet Take 1 tablet by mouth 2 (Two) Times a Day. 60 tablet 2    traZODone (DESYREL) 50 MG tablet TAKE 1 TO 2 TABLETS BY MOUTH AT BEDTIME AS NEEDED FOR SLEEP 180 tablet 0    [DISCONTINUED] norethindrone (Aygestin) 5 MG tablet Take 2 tablets by mouth Daily. 60 tablet 5     No current facility-administered medications on file prior to visit.       Social History    Tobacco Use      Smoking status: Some Days        Packs/day: 0.25        Years: 0.3 packs/day for 2.0 years (0.5 ttl pk-yrs)        Types: Cigarettes        Passive exposure: Never      Smokeless tobacco: Never      Tobacco comments: 1 pack per week         Objective   /80   Ht 170.2 cm (67\")   Wt 108 kg (239 lb)   BMI 37.43 kg/m²     Physical Exam:  General Appearance: alert, pleasant, appears stated age, interactive and cooperative  Normal speculum exam.       Medical Decision Making:    I reviewed my most recent operative notes.  I reviewed her pelvic ultrasound from 2022.    Assessment   Abnormal uterine bleeding  Menorrhagia with irregular cycle  Chronic pelvic pain  Dysmenorrhea  Failed " multiple medical and surgical therapies     Plan    Orders Placed This Encounter   Procedures    NuSwab VG+ - Swab, Cervix     Order Specific Question:   Release to patient     Answer:   Routine Release [[id number]]       Medication Management: None    Procedures Performed: Pap smear    We reviewed her situation in detail today.  We discussed previous treatment options.  She strongly desires definitive surgical management via hysterectomy.  We discussed that procedure in detail today.  I think this is very reasonable given that we have exhausted all of the more conservative approaches.  A Pap smear and vaginal cultures were obtained today.  Plan for total laparoscopic hysterectomy, bilateral salpingectomy and cystoscopy.  Risks for the procedure were reviewed in detail today.  All questions answered.    Kelvin Hinton MD  Obstetrics and Gynecology  [facility]

## 2024-08-09 NOTE — TELEPHONE ENCOUNTER
Called patient and advised to call [facility] and they will transfer it to [place] [facility] for [facility]

## 2024-08-09 NOTE — TELEPHONE ENCOUNTER
PT NEEDS PRESCRIPTION SENT TO THE [facility] IN [place] - WE SENT IT TO THE [facility] IN [place] PLEASE CALL     PT WOULD ALSO LIKE TO LET DR FOLEY KNOW THAT SHE IS ON AN ANTIBIOTIC FOR BV AND SHE'S FEELING A LOT BETTER

## 2024-08-10 DIAGNOSIS — B37.9 YEAST INFECTION: Primary | ICD-10-CM

## 2024-08-10 RX ORDER — FLUCONAZOLE 150 MG/1
TABLET ORAL
Qty: 2 TABLET | Refills: 0 | Status: SHIPPED | OUTPATIENT
Start: 2024-08-10

## 2024-08-13 ENCOUNTER — TELEPHONE (OUTPATIENT)
Dept: OBSTETRICS AND GYNECOLOGY | Facility: CLINIC | Age: 29
End: 2024-08-13

## 2024-08-13 NOTE — TELEPHONE ENCOUNTER
Caller: Ibarra Nery M    Relationship: Self    Best call back number: [phone] CALL ANYTIME AFTER 2:00PM. IT IS OKAY TO LVM.    Requested Prescriptions:   Requested Prescriptions      No prescriptions requested or ordered in this encounter      PATIENT IS REQUESTING CALL BACK TO CONFIRM DIFLUCAN IS STILL NEEDED.  Pharmacy where request should be sent: [facility] [address] [place] [address] [phone] [facility] [phone] FX     Last office visit with prescribing clinician: 8/1/2024   Last telemedicine visit with prescribing clinician: Visit date not found   Next office visit with prescribing clinician: 9/5/2024     Additional details provided by patient: PATIENT HAS ONE MORE DOSE OF metroNIDAZOLE (Flagyl) 500 MG tablet [5016]  PRESCRIBED ON 08/05/24. PATIENT PICKED UP DIFLUCAN PRESCRIBED ON 08/10/24. IS NOT HAVING BACTERIAL VAGINOSIS SYMPTOMS. WANTS TO CONFIRM IF DIFLUCAN STILL NEEDED.    Does the patient have less than a 3 day supply:  [] Yes  [] No -NA    Would you like a call back once the refill request has been completed: [x] Yes [] No    If the office needs to give you a call back, can they leave a voicemail: [x] Yes [] No    Fady Delacruz Rep   08/13/24 15:50 EDT

## 2024-08-13 NOTE — TELEPHONE ENCOUNTER
Patient states she is not having BV symptoms, wanted to conform if she still needed to take Flagyl.

## 2024-08-14 ENCOUNTER — TELEPHONE (OUTPATIENT)
Dept: OBSTETRICS AND GYNECOLOGY | Facility: CLINIC | Age: 29
End: 2024-08-14
Payer: MEDICAID

## 2024-08-14 NOTE — TELEPHONE ENCOUNTER
----- Message from Kelvin Hinton sent at 8/10/2024  6:26 AM EDT -----  Please let this patient know that her Pap smear was negative for cellular changes, but positive for HPV.  Given the particular strain, no additional workup is needed right now. The Pap smear also identified yeast and I have called in Diflucan.  She should take this medication after completing the Flagyl for the BV.  It is important that she takes both medications prior to her hysterectomy on 8/29. These types of infections can affect healing/recovery from surgery. Thanks

## 2024-08-14 NOTE — TELEPHONE ENCOUNTER
Unable to reach patient. Patient is active on mychart , I have sent message to patient via Anywhere to Go

## 2024-08-15 ENCOUNTER — TELEPHONE (OUTPATIENT)
Dept: PREADMISSION TESTING | Facility: HOSPITAL | Age: 29
End: 2024-08-15

## 2024-08-16 ENCOUNTER — PRE-ADMISSION TESTING (OUTPATIENT)
Dept: PREADMISSION TESTING | Facility: HOSPITAL | Age: 29
End: 2024-08-16
Payer: MEDICAID

## 2024-08-16 VITALS — HEIGHT: 67 IN | WEIGHT: 236 LBS | BODY MASS INDEX: 37.04 KG/M2

## 2024-08-16 DIAGNOSIS — G89.29 CHRONIC PELVIC PAIN IN FEMALE: ICD-10-CM

## 2024-08-16 DIAGNOSIS — Z98.890 HISTORY OF ENDOMETRIAL ABLATION: ICD-10-CM

## 2024-08-16 DIAGNOSIS — R10.2 CHRONIC PELVIC PAIN IN FEMALE: ICD-10-CM

## 2024-08-16 DIAGNOSIS — N94.6 DYSMENORRHEA: ICD-10-CM

## 2024-08-16 DIAGNOSIS — N93.9 ABNORMAL UTERINE BLEEDING (AUB): ICD-10-CM

## 2024-08-16 LAB
ABO GROUP BLD: NORMAL
ANION GAP SERPL CALCULATED.3IONS-SCNC: 10 MMOL/L (ref 5–15)
BACTERIA UR QL AUTO: ABNORMAL /HPF
BASOPHILS # BLD AUTO: 0.05 10*3/MM3 (ref 0–0.2)
BASOPHILS NFR BLD AUTO: 0.4 % (ref 0–1.5)
BILIRUB UR QL STRIP: NEGATIVE
BUN SERPL-MCNC: 20 MG/DL (ref 6–20)
BUN/CREAT SERPL: 27 (ref 7–25)
CALCIUM SPEC-SCNC: 10 MG/DL (ref 8.6–10.5)
CHLORIDE SERPL-SCNC: 103 MMOL/L (ref 98–107)
CLARITY UR: CLEAR
CO2 SERPL-SCNC: 24 MMOL/L (ref 22–29)
COLOR UR: YELLOW
CREAT SERPL-MCNC: 0.74 MG/DL (ref 0.57–1)
DEPRECATED RDW RBC AUTO: 38.5 FL (ref 37–54)
EGFRCR SERPLBLD CKD-EPI 2021: 113.9 ML/MIN/1.73
EOSINOPHIL # BLD AUTO: 0.13 10*3/MM3 (ref 0–0.4)
EOSINOPHIL NFR BLD AUTO: 1.1 % (ref 0.3–6.2)
ERYTHROCYTE [DISTWIDTH] IN BLOOD BY AUTOMATED COUNT: 12.2 % (ref 12.3–15.4)
GLUCOSE SERPL-MCNC: 93 MG/DL (ref 65–99)
GLUCOSE UR STRIP-MCNC: NEGATIVE MG/DL
HCT VFR BLD AUTO: 43.8 % (ref 34–46.6)
HGB BLD-MCNC: 15.6 G/DL (ref 12–15.9)
HGB UR QL STRIP.AUTO: NEGATIVE
HYALINE CASTS UR QL AUTO: ABNORMAL /LPF
IMM GRANULOCYTES # BLD AUTO: 0.03 10*3/MM3 (ref 0–0.05)
IMM GRANULOCYTES NFR BLD AUTO: 0.3 % (ref 0–0.5)
KETONES UR QL STRIP: ABNORMAL
LEUKOCYTE ESTERASE UR QL STRIP.AUTO: NEGATIVE
LYMPHOCYTES # BLD AUTO: 2.73 10*3/MM3 (ref 0.7–3.1)
LYMPHOCYTES NFR BLD AUTO: 24.1 % (ref 19.6–45.3)
MCH RBC QN AUTO: 31.1 PG (ref 26.6–33)
MCHC RBC AUTO-ENTMCNC: 35.6 G/DL (ref 31.5–35.7)
MCV RBC AUTO: 87.4 FL (ref 79–97)
MONOCYTES # BLD AUTO: 0.64 10*3/MM3 (ref 0.1–0.9)
MONOCYTES NFR BLD AUTO: 5.6 % (ref 5–12)
MUCOUS THREADS URNS QL MICRO: ABNORMAL /HPF
NEUTROPHILS NFR BLD AUTO: 68.5 % (ref 42.7–76)
NEUTROPHILS NFR BLD AUTO: 7.76 10*3/MM3 (ref 1.7–7)
NITRITE UR QL STRIP: NEGATIVE
NRBC BLD AUTO-RTO: 0 /100 WBC (ref 0–0.2)
PH UR STRIP.AUTO: 5.5 [PH] (ref 5–8)
PLATELET # BLD AUTO: 265 10*3/MM3 (ref 140–450)
PMV BLD AUTO: 10.7 FL (ref 6–12)
POTASSIUM SERPL-SCNC: 3.7 MMOL/L (ref 3.5–5.2)
PROT UR QL STRIP: ABNORMAL
RBC # BLD AUTO: 5.01 10*6/MM3 (ref 3.77–5.28)
RBC # UR STRIP: ABNORMAL /HPF
REF LAB TEST METHOD: ABNORMAL
RENAL EPI CELLS #/AREA URNS HPF: ABNORMAL /HPF
RH BLD: POSITIVE
SODIUM SERPL-SCNC: 137 MMOL/L (ref 136–145)
SP GR UR STRIP: 1.02 (ref 1–1.03)
SQUAMOUS #/AREA URNS HPF: ABNORMAL /HPF
TRANS CELLS #/AREA URNS HPF: ABNORMAL /HPF
UROBILINOGEN UR QL STRIP: ABNORMAL
WBC # UR STRIP: ABNORMAL /HPF
WBC NRBC COR # BLD AUTO: 11.34 10*3/MM3 (ref 3.4–10.8)

## 2024-08-16 PROCEDURE — 80048 BASIC METABOLIC PNL TOTAL CA: CPT

## 2024-08-16 PROCEDURE — 85025 COMPLETE CBC W/AUTO DIFF WBC: CPT

## 2024-08-16 PROCEDURE — 36415 COLL VENOUS BLD VENIPUNCTURE: CPT

## 2024-08-16 PROCEDURE — 86901 BLOOD TYPING SEROLOGIC RH(D): CPT

## 2024-08-16 PROCEDURE — 81001 URINALYSIS AUTO W/SCOPE: CPT

## 2024-08-16 PROCEDURE — 86900 BLOOD TYPING SEROLOGIC ABO: CPT

## 2024-08-16 NOTE — DISCHARGE INSTRUCTIONS
Pre-Admission testing appointment completed today for patient's upcoming procedure here at [facility].    PAT PASS reviewed with patient and they verbalize understanding of the following:     Do not eat or drink anything after midnight the night before procedure unless otherwise instructed by physician/surgeon's office, this includes no gum, candy, mints, tobacco products or e-cigarettes.  Do not shave the area to be operated on at least 48 hours prior to procedure.  Do not wear makeup, lotion, hair products, or nail polish.  Do not wear any jewelry and remove all piercings.  Do not wear any adhesive if you wear dentures.  Do not wear contacts; bring in glasses if needed.  Only take medications on the morning of procedure as instructed by PAT nurse per anesthesia guidelines or as instructed by physician's office.  If you are on any blood thinners reach out to the physician/surgeon's office for instructions on when/if they will need to be stopped prior to procedure.  Bring in picture ID and insurance card, advanced directive copies if applicable, CPAP/BIPAP/Inhalers if indicated morning of procedure, leave any other valuables at home.  Ensure you have arranged for someone to drive you home the day of your procedure and someone to care for you at home afterwards. It is recommended that you do not drive, drink alcohol, or make any major legal decisions for at least 24 hours after your procedure is complete.  Chlorhexadine sponge along with instruction/information sheet given to patient. Readybath wipes given in lieu of CHG if patient has CHG allergy. Instructed patient to date, time, and initial the verification sheet once skin prep has been completed, and to return to [facility] the day of the procedure.     Introduction to anesthesia video watched by patient during appointment and anesthesia FAQ tip sheet given to patient.  Instructions and information given to patient about parking, hospital entrance, and  registration location.

## 2024-08-29 ENCOUNTER — ANESTHESIA EVENT (OUTPATIENT)
Dept: PERIOP | Facility: HOSPITAL | Age: 29
End: 2024-08-29
Payer: MEDICAID

## 2024-08-29 ENCOUNTER — HOSPITAL ENCOUNTER (OUTPATIENT)
Facility: HOSPITAL | Age: 29
Discharge: HOME | End: 2024-08-30
Attending: OBSTETRICS & GYNECOLOGY | Admitting: OBSTETRICS & GYNECOLOGY
Payer: MEDICAID

## 2024-08-29 ENCOUNTER — ANESTHESIA (OUTPATIENT)
Dept: PERIOP | Facility: HOSPITAL | Age: 29
End: 2024-08-29
Payer: MEDICAID

## 2024-08-29 DIAGNOSIS — Z90.710 S/P LAPAROSCOPIC HYSTERECTOMY: Primary | ICD-10-CM

## 2024-08-29 DIAGNOSIS — N94.6 DYSMENORRHEA: ICD-10-CM

## 2024-08-29 DIAGNOSIS — N93.9 ABNORMAL UTERINE BLEEDING (AUB): ICD-10-CM

## 2024-08-29 DIAGNOSIS — R10.2 CHRONIC PELVIC PAIN IN FEMALE: ICD-10-CM

## 2024-08-29 DIAGNOSIS — G89.29 CHRONIC PELVIC PAIN IN FEMALE: ICD-10-CM

## 2024-08-29 DIAGNOSIS — Z98.890 HISTORY OF ENDOMETRIAL ABLATION: ICD-10-CM

## 2024-08-29 PROBLEM — Z98.51 H/O TUBAL LIGATION: Status: RESOLVED | Noted: 2024-01-31 | Resolved: 2024-08-29

## 2024-08-29 LAB
ABO GROUP BLD: NORMAL
B-HCG UR QL: NEGATIVE
BLD GP AB SCN SERPL QL: NEGATIVE
EXPIRATION DATE: NORMAL
INTERNAL NEGATIVE CONTROL: NORMAL
INTERNAL POSITIVE CONTROL: NORMAL
Lab: NORMAL
RH BLD: POSITIVE
T&S EXPIRATION DATE: NORMAL

## 2024-08-29 PROCEDURE — 58571 TLH W/T/O 250 G OR LESS: CPT | Performed by: OBSTETRICS & GYNECOLOGY

## 2024-08-29 PROCEDURE — S0260 H&P FOR SURGERY: HCPCS | Performed by: OBSTETRICS & GYNECOLOGY

## 2024-08-29 PROCEDURE — 86850 RBC ANTIBODY SCREEN: CPT | Performed by: OBSTETRICS & GYNECOLOGY

## 2024-08-29 PROCEDURE — 25810000003 LACTATED RINGERS PER 1000 ML: Performed by: OBSTETRICS & GYNECOLOGY

## 2024-08-29 PROCEDURE — 86900 BLOOD TYPING SEROLOGIC ABO: CPT | Performed by: OBSTETRICS & GYNECOLOGY

## 2024-08-29 PROCEDURE — 25010000002 PROPOFOL 10 MG/ML EMULSION: Performed by: NURSE ANESTHETIST, CERTIFIED REGISTERED

## 2024-08-29 PROCEDURE — 25010000002 HYDROMORPHONE 1 MG/ML SOLUTION: Performed by: OBSTETRICS & GYNECOLOGY

## 2024-08-29 PROCEDURE — 25010000002 HYDROMORPHONE PER 4 MG: Performed by: NURSE ANESTHETIST, CERTIFIED REGISTERED

## 2024-08-29 PROCEDURE — 86901 BLOOD TYPING SEROLOGIC RH(D): CPT | Performed by: OBSTETRICS & GYNECOLOGY

## 2024-08-29 PROCEDURE — G0378 HOSPITAL OBSERVATION PER HR: HCPCS

## 2024-08-29 PROCEDURE — 25010000002 SUGAMMADEX 200 MG/2ML SOLUTION: Performed by: NURSE ANESTHETIST, CERTIFIED REGISTERED

## 2024-08-29 PROCEDURE — 25010000002 KETOROLAC TROMETHAMINE PER 15 MG: Performed by: NURSE ANESTHETIST, CERTIFIED REGISTERED

## 2024-08-29 PROCEDURE — 25010000002 HYDROMORPHONE 1 MG/ML SOLUTION: Performed by: NURSE ANESTHETIST, CERTIFIED REGISTERED

## 2024-08-29 PROCEDURE — 25010000002 ONDANSETRON PER 1 MG: Performed by: NURSE ANESTHETIST, CERTIFIED REGISTERED

## 2024-08-29 PROCEDURE — 25010000002 ONDANSETRON PER 1 MG: Performed by: OBSTETRICS & GYNECOLOGY

## 2024-08-29 PROCEDURE — 81025 URINE PREGNANCY TEST: CPT | Performed by: OBSTETRICS & GYNECOLOGY

## 2024-08-29 PROCEDURE — 63710000001 PROMETHAZINE PER 12.5 MG: Performed by: OBSTETRICS & GYNECOLOGY

## 2024-08-29 PROCEDURE — 25010000002 LORAZEPAM PER 2 MG: Performed by: NURSE ANESTHETIST, CERTIFIED REGISTERED

## 2024-08-29 PROCEDURE — 25010000002 DEXAMETHASONE PER 1 MG: Performed by: NURSE ANESTHETIST, CERTIFIED REGISTERED

## 2024-08-29 PROCEDURE — 25010000002 MEPERIDINE PER 100 MG: Performed by: MIDWIFE

## 2024-08-29 PROCEDURE — 25010000002 CEFAZOLIN PER 500 MG: Performed by: OBSTETRICS & GYNECOLOGY

## 2024-08-29 DEVICE — ABSORBABLE RELOAD
Type: IMPLANTABLE DEVICE | Site: ABDOMEN | Status: FUNCTIONAL
Brand: V-LOC 180

## 2024-08-29 DEVICE — SEAL HEMO SURG ARISTA/AH ABS/PWDR 5GM: Type: IMPLANTABLE DEVICE | Site: ABDOMEN | Status: FUNCTIONAL

## 2024-08-29 RX ORDER — MAGNESIUM HYDROXIDE 1200 MG/15ML
LIQUID ORAL AS NEEDED
Status: DISCONTINUED | OUTPATIENT
Start: 2024-08-29 | End: 2024-08-29 | Stop reason: HOSPADM

## 2024-08-29 RX ORDER — MEPERIDINE HYDROCHLORIDE 50 MG/1
25 TABLET ORAL ONCE
Status: DISCONTINUED | OUTPATIENT
Start: 2024-08-29 | End: 2024-08-29 | Stop reason: RX

## 2024-08-29 RX ORDER — DEXAMETHASONE SODIUM PHOSPHATE 4 MG/ML
INJECTION, SOLUTION INTRA-ARTICULAR; INTRALESIONAL; INTRAMUSCULAR; INTRAVENOUS; SOFT TISSUE AS NEEDED
Status: DISCONTINUED | OUTPATIENT
Start: 2024-08-29 | End: 2024-08-29 | Stop reason: SURG

## 2024-08-29 RX ORDER — OXYCODONE HYDROCHLORIDE 5 MG/1
10 TABLET ORAL EVERY 4 HOURS PRN
Status: DISCONTINUED | OUTPATIENT
Start: 2024-08-29 | End: 2024-08-30 | Stop reason: HOSPADM

## 2024-08-29 RX ORDER — ONDANSETRON 4 MG/1
4 TABLET, ORALLY DISINTEGRATING ORAL EVERY 6 HOURS PRN
Status: DISCONTINUED | OUTPATIENT
Start: 2024-08-29 | End: 2024-08-30 | Stop reason: HOSPADM

## 2024-08-29 RX ORDER — DOCUSATE SODIUM 100 MG/1
100 CAPSULE, LIQUID FILLED ORAL 2 TIMES DAILY
Status: DISCONTINUED | OUTPATIENT
Start: 2024-08-29 | End: 2024-08-30 | Stop reason: HOSPADM

## 2024-08-29 RX ORDER — OXYCODONE HYDROCHLORIDE 5 MG/1
5 TABLET ORAL EVERY 4 HOURS PRN
Status: DISCONTINUED | OUTPATIENT
Start: 2024-08-29 | End: 2024-08-30 | Stop reason: HOSPADM

## 2024-08-29 RX ORDER — ROCURONIUM BROMIDE 50 MG/5 ML
SYRINGE (ML) INTRAVENOUS AS NEEDED
Status: DISCONTINUED | OUTPATIENT
Start: 2024-08-29 | End: 2024-08-29 | Stop reason: SURG

## 2024-08-29 RX ORDER — ACETAMINOPHEN 500 MG
1000 TABLET ORAL EVERY 8 HOURS
Status: DISCONTINUED | OUTPATIENT
Start: 2024-08-29 | End: 2024-08-30

## 2024-08-29 RX ORDER — ONDANSETRON 2 MG/ML
INJECTION INTRAMUSCULAR; INTRAVENOUS AS NEEDED
Status: DISCONTINUED | OUTPATIENT
Start: 2024-08-29 | End: 2024-08-29 | Stop reason: SURG

## 2024-08-29 RX ORDER — IBUPROFEN 800 MG/1
800 TABLET, FILM COATED ORAL EVERY 8 HOURS
Status: DISCONTINUED | OUTPATIENT
Start: 2024-08-29 | End: 2024-08-30 | Stop reason: HOSPADM

## 2024-08-29 RX ORDER — LORAZEPAM 2 MG/ML
1 INJECTION INTRAMUSCULAR ONCE AS NEEDED
Status: COMPLETED | OUTPATIENT
Start: 2024-08-29 | End: 2024-08-29

## 2024-08-29 RX ORDER — FAMOTIDINE 20 MG/1
20 TABLET, FILM COATED ORAL 2 TIMES DAILY
Status: DISCONTINUED | OUTPATIENT
Start: 2024-08-29 | End: 2024-08-30 | Stop reason: HOSPADM

## 2024-08-29 RX ORDER — SIMETHICONE 80 MG
80 TABLET,CHEWABLE ORAL 4 TIMES DAILY PRN
Status: DISCONTINUED | OUTPATIENT
Start: 2024-08-29 | End: 2024-08-30 | Stop reason: HOSPADM

## 2024-08-29 RX ORDER — HYDROMORPHONE HYDROCHLORIDE 2 MG/ML
INJECTION, SOLUTION INTRAMUSCULAR; INTRAVENOUS; SUBCUTANEOUS AS NEEDED
Status: DISCONTINUED | OUTPATIENT
Start: 2024-08-29 | End: 2024-08-29 | Stop reason: SURG

## 2024-08-29 RX ORDER — MORPHINE SULFATE 2 MG/ML
2 INJECTION, SOLUTION INTRAMUSCULAR; INTRAVENOUS
Status: DISCONTINUED | OUTPATIENT
Start: 2024-08-29 | End: 2024-08-29 | Stop reason: HOSPADM

## 2024-08-29 RX ORDER — SODIUM CHLORIDE 9 MG/ML
40 INJECTION, SOLUTION INTRAVENOUS AS NEEDED
Status: DISCONTINUED | OUTPATIENT
Start: 2024-08-29 | End: 2024-08-29 | Stop reason: HOSPADM

## 2024-08-29 RX ORDER — SCOLOPAMINE TRANSDERMAL SYSTEM 1 MG/1
1 PATCH, EXTENDED RELEASE TRANSDERMAL
Status: DISCONTINUED | OUTPATIENT
Start: 2024-08-29 | End: 2024-08-29 | Stop reason: HOSPADM

## 2024-08-29 RX ORDER — PROMETHAZINE HYDROCHLORIDE 25 MG/1
25 TABLET ORAL ONCE
Status: DISCONTINUED | OUTPATIENT
Start: 2024-08-29 | End: 2024-08-30 | Stop reason: HOSPADM

## 2024-08-29 RX ORDER — PROMETHAZINE HYDROCHLORIDE 12.5 MG/1
12.5 TABLET ORAL EVERY 6 HOURS PRN
Status: DISCONTINUED | OUTPATIENT
Start: 2024-08-29 | End: 2024-08-30 | Stop reason: HOSPADM

## 2024-08-29 RX ORDER — SUCCINYLCHOLINE/SOD CL,ISO/PF 200MG/10ML
SYRINGE (ML) INTRAVENOUS AS NEEDED
Status: DISCONTINUED | OUTPATIENT
Start: 2024-08-29 | End: 2024-08-29 | Stop reason: SURG

## 2024-08-29 RX ORDER — LORAZEPAM 0.5 MG/1
0.5 TABLET ORAL EVERY 8 HOURS PRN
Status: DISCONTINUED | OUTPATIENT
Start: 2024-08-29 | End: 2024-08-30 | Stop reason: HOSPADM

## 2024-08-29 RX ORDER — ONDANSETRON 2 MG/ML
4 INJECTION INTRAMUSCULAR; INTRAVENOUS ONCE AS NEEDED
Status: COMPLETED | OUTPATIENT
Start: 2024-08-29 | End: 2024-08-29

## 2024-08-29 RX ORDER — CALCIUM CARBONATE 500 MG/1
1 TABLET, CHEWABLE ORAL 3 TIMES DAILY PRN
Status: DISCONTINUED | OUTPATIENT
Start: 2024-08-29 | End: 2024-08-30 | Stop reason: HOSPADM

## 2024-08-29 RX ORDER — ONDANSETRON 2 MG/ML
4 INJECTION INTRAMUSCULAR; INTRAVENOUS EVERY 6 HOURS PRN
Status: DISCONTINUED | OUTPATIENT
Start: 2024-08-29 | End: 2024-08-30 | Stop reason: HOSPADM

## 2024-08-29 RX ORDER — MEPERIDINE HYDROCHLORIDE 25 MG/ML
50 INJECTION INTRAMUSCULAR; INTRAVENOUS; SUBCUTANEOUS ONCE
Status: COMPLETED | OUTPATIENT
Start: 2024-08-29 | End: 2024-08-29

## 2024-08-29 RX ORDER — KETOROLAC TROMETHAMINE 30 MG/ML
INJECTION, SOLUTION INTRAMUSCULAR; INTRAVENOUS AS NEEDED
Status: DISCONTINUED | OUTPATIENT
Start: 2024-08-29 | End: 2024-08-29 | Stop reason: SURG

## 2024-08-29 RX ORDER — SODIUM CHLORIDE 0.9 % (FLUSH) 0.9 %
10 SYRINGE (ML) INJECTION AS NEEDED
Status: DISCONTINUED | OUTPATIENT
Start: 2024-08-29 | End: 2024-08-29 | Stop reason: HOSPADM

## 2024-08-29 RX ORDER — NALOXONE HCL 0.4 MG/ML
0.1 VIAL (ML) INJECTION
Status: DISCONTINUED | OUTPATIENT
Start: 2024-08-29 | End: 2024-08-30 | Stop reason: HOSPADM

## 2024-08-29 RX ORDER — PROPOFOL 10 MG/ML
VIAL (ML) INTRAVENOUS AS NEEDED
Status: DISCONTINUED | OUTPATIENT
Start: 2024-08-29 | End: 2024-08-29 | Stop reason: SURG

## 2024-08-29 RX ORDER — OXCARBAZEPINE 300 MG/1
300 TABLET, FILM COATED ORAL 2 TIMES DAILY
Status: DISCONTINUED | OUTPATIENT
Start: 2024-08-29 | End: 2024-08-30 | Stop reason: HOSPADM

## 2024-08-29 RX ORDER — ALUMINA, MAGNESIA, AND SIMETHICONE 2400; 2400; 240 MG/30ML; MG/30ML; MG/30ML
15 SUSPENSION ORAL EVERY 6 HOURS
Status: DISCONTINUED | OUTPATIENT
Start: 2024-08-29 | End: 2024-08-30 | Stop reason: HOSPADM

## 2024-08-29 RX ORDER — MEPERIDINE HYDROCHLORIDE 25 MG/ML
25 INJECTION INTRAMUSCULAR; INTRAVENOUS; SUBCUTANEOUS ONCE AS NEEDED
Status: DISCONTINUED | OUTPATIENT
Start: 2024-08-29 | End: 2024-08-29 | Stop reason: HOSPADM

## 2024-08-29 RX ORDER — DEXTROSE MONOHYDRATE AND SODIUM CHLORIDE 5; .45 G/100ML; G/100ML
75 INJECTION, SOLUTION INTRAVENOUS CONTINUOUS
Status: DISCONTINUED | OUTPATIENT
Start: 2024-08-29 | End: 2024-08-30 | Stop reason: HOSPADM

## 2024-08-29 RX ORDER — SODIUM CHLORIDE, SODIUM LACTATE, POTASSIUM CHLORIDE, CALCIUM CHLORIDE 600; 310; 30; 20 MG/100ML; MG/100ML; MG/100ML; MG/100ML
1000 INJECTION, SOLUTION INTRAVENOUS CONTINUOUS
Status: DISCONTINUED | OUTPATIENT
Start: 2024-08-29 | End: 2024-08-29

## 2024-08-29 RX ORDER — PHENAZOPYRIDINE HYDROCHLORIDE 100 MG/1
200 TABLET, FILM COATED ORAL ONCE
Status: COMPLETED | OUTPATIENT
Start: 2024-08-29 | End: 2024-08-29

## 2024-08-29 RX ORDER — SODIUM CHLORIDE 0.9 % (FLUSH) 0.9 %
10 SYRINGE (ML) INJECTION EVERY 12 HOURS SCHEDULED
Status: DISCONTINUED | OUTPATIENT
Start: 2024-08-29 | End: 2024-08-29 | Stop reason: HOSPADM

## 2024-08-29 RX ORDER — PROMETHAZINE HYDROCHLORIDE 12.5 MG/1
12.5 SUPPOSITORY RECTAL EVERY 6 HOURS PRN
Status: DISCONTINUED | OUTPATIENT
Start: 2024-08-29 | End: 2024-08-30 | Stop reason: HOSPADM

## 2024-08-29 RX ADMIN — LORAZEPAM 0.5 MG: 0.5 TABLET ORAL at 21:12

## 2024-08-29 RX ADMIN — DEXAMETHASONE SODIUM PHOSPHATE 4 MG: 4 INJECTION, SOLUTION INTRA-ARTICULAR; INTRALESIONAL; INTRAMUSCULAR; INTRAVENOUS; SOFT TISSUE at 07:37

## 2024-08-29 RX ADMIN — FAMOTIDINE 20 MG: 20 TABLET, FILM COATED ORAL at 13:27

## 2024-08-29 RX ADMIN — OXYCODONE HYDROCHLORIDE 10 MG: 5 TABLET ORAL at 13:31

## 2024-08-29 RX ADMIN — PHENAZOPYRIDINE HYDROCHLORIDE 200 MG: 100 TABLET ORAL at 07:03

## 2024-08-29 RX ADMIN — HYDROMORPHONE HYDROCHLORIDE 0.5 MG: 1 INJECTION, SOLUTION INTRAMUSCULAR; INTRAVENOUS; SUBCUTANEOUS at 10:19

## 2024-08-29 RX ADMIN — DOCUSATE SODIUM 100 MG: 100 CAPSULE, LIQUID FILLED ORAL at 20:17

## 2024-08-29 RX ADMIN — OXCARBAZEPINE 300 MG: 300 TABLET, FILM COATED ORAL at 20:17

## 2024-08-29 RX ADMIN — SODIUM CHLORIDE, POTASSIUM CHLORIDE, SODIUM LACTATE AND CALCIUM CHLORIDE: 600; 310; 30; 20 INJECTION, SOLUTION INTRAVENOUS at 08:38

## 2024-08-29 RX ADMIN — SODIUM CHLORIDE, POTASSIUM CHLORIDE, SODIUM LACTATE AND CALCIUM CHLORIDE 1000 ML: 600; 310; 30; 20 INJECTION, SOLUTION INTRAVENOUS at 07:03

## 2024-08-29 RX ADMIN — LORAZEPAM 1 MG: 2 INJECTION INTRAMUSCULAR; INTRAVENOUS at 10:12

## 2024-08-29 RX ADMIN — PROMETHAZINE HYDROCHLORIDE 12.5 MG: 12.5 TABLET ORAL at 18:59

## 2024-08-29 RX ADMIN — SCOPOLAMINE 1 PATCH: 1.5 PATCH, EXTENDED RELEASE TRANSDERMAL at 07:15

## 2024-08-29 RX ADMIN — Medication 70 MG: at 07:37

## 2024-08-29 RX ADMIN — PROMETHAZINE HYDROCHLORIDE 12.5 MG: 12.5 TABLET ORAL at 12:10

## 2024-08-29 RX ADMIN — FAMOTIDINE 20 MG: 20 TABLET, FILM COATED ORAL at 20:17

## 2024-08-29 RX ADMIN — HYDROMORPHONE HYDROCHLORIDE 0.5 MG: 1 INJECTION, SOLUTION INTRAMUSCULAR; INTRAVENOUS; SUBCUTANEOUS at 09:56

## 2024-08-29 RX ADMIN — DOCUSATE SODIUM 100 MG: 100 CAPSULE, LIQUID FILLED ORAL at 13:26

## 2024-08-29 RX ADMIN — OXYCODONE HYDROCHLORIDE 10 MG: 5 TABLET ORAL at 18:33

## 2024-08-29 RX ADMIN — IBUPROFEN 800 MG: 800 TABLET, FILM COATED ORAL at 13:26

## 2024-08-29 RX ADMIN — PROPOFOL 200 MG: 10 INJECTION, EMULSION INTRAVENOUS at 07:37

## 2024-08-29 RX ADMIN — ONDANSETRON 4 MG: 2 INJECTION INTRAMUSCULAR; INTRAVENOUS at 10:41

## 2024-08-29 RX ADMIN — IBUPROFEN 800 MG: 800 TABLET, FILM COATED ORAL at 20:17

## 2024-08-29 RX ADMIN — DEXTROSE AND SODIUM CHLORIDE 75 ML/HR: 5; 450 INJECTION, SOLUTION INTRAVENOUS at 12:13

## 2024-08-29 RX ADMIN — SODIUM CHLORIDE 2000 MG: 9 INJECTION, SOLUTION INTRAVENOUS at 07:38

## 2024-08-29 RX ADMIN — HYDROMORPHONE HYDROCHLORIDE 2 MG: 2 INJECTION, SOLUTION INTRAMUSCULAR; INTRAVENOUS; SUBCUTANEOUS at 07:37

## 2024-08-29 RX ADMIN — ALUMINUM HYDROXIDE, MAGNESIUM HYDROXIDE, DIMETHICONE 15 ML: 400; 400; 40 SUSPENSION ORAL at 13:26

## 2024-08-29 RX ADMIN — SUGAMMADEX 200 MG: 100 INJECTION, SOLUTION INTRAVENOUS at 09:45

## 2024-08-29 RX ADMIN — ONDANSETRON 4 MG: 2 INJECTION INTRAMUSCULAR; INTRAVENOUS at 16:06

## 2024-08-29 RX ADMIN — MEPERIDINE HYDROCHLORIDE 50 MG: 25 INJECTION INTRAMUSCULAR; INTRAVENOUS; SUBCUTANEOUS at 21:31

## 2024-08-29 RX ADMIN — ONDANSETRON 4 MG: 2 INJECTION INTRAMUSCULAR; INTRAVENOUS at 07:37

## 2024-08-29 RX ADMIN — HYDROMORPHONE HYDROCHLORIDE 0.5 MG: 1 INJECTION, SOLUTION INTRAMUSCULAR; INTRAVENOUS; SUBCUTANEOUS at 15:14

## 2024-08-29 RX ADMIN — LIDOCAINE HYDROCHLORIDE 60 MG: 20 INJECTION, SOLUTION INTRAVENOUS at 07:37

## 2024-08-29 RX ADMIN — KETOROLAC TROMETHAMINE 15 MG: 30 INJECTION, SOLUTION INTRAMUSCULAR at 07:37

## 2024-08-29 RX ADMIN — Medication 100 MG: at 07:37

## 2024-08-29 RX ADMIN — ACETAMINOPHEN 1000 MG: 500 TABLET, FILM COATED ORAL at 22:08

## 2024-08-29 NOTE — ANESTHESIA POSTPROCEDURE EVALUATION
Patient: Nery Ibarra    Procedure Summary       Date: 08/29/24 Room / Location: [facility] OR  /  [facility] OR    Anesthesia Start: 0728 Anesthesia Stop: 0949    Procedure: TOTAL LAPAROSCOPIC HYSTERECTOMY, CYSTOSCOPY (Abdomen) Diagnosis:       Abnormal uterine bleeding (AUB)      History of endometrial ablation      Chronic pelvic pain in female      Dysmenorrhea      (Abnormal uterine bleeding (AUB) [N93.9])      (History of endometrial ablation [Z98.890])      (Chronic pelvic pain in female [R10.2, G89.29])      (Dysmenorrhea [N94.6])    Surgeons: Kelvin Hinton MD Provider: Eliezer Justice CRNA    Anesthesia Type: general ASA Status: 3            Anesthesia Type: general    Vitals  Vitals Value Taken Time   /68 08/29/24 0946   Temp     Pulse 126 08/29/24 0948   Resp     SpO2 100 % 08/29/24 0948   Vitals shown include unfiled device data.        Post Anesthesia Care and Evaluation    Patient location during evaluation: PACU  Patient participation: complete - patient participated  Level of consciousness: awake  Pain score: 3  Pain management: adequate    Airway patency: patent  Anesthetic complications: No anesthetic complications  PONV Status: controlled  Cardiovascular status: acceptable and stable  Respiratory status: acceptable and face mask  Hydration status: acceptable    Comments: SEE NURSING NOTES FOR POST OP VITAL SIGNS

## 2024-08-29 NOTE — H&P
GYNECOLOGY HISTORY AND PHYSICAL    CHIEF COMPLAINT: Scheduled surgery    DIAGNOSIS:  Abnormal uterine bleeding  Menorrhagia with irregular cycle  Chronic pelvic pain  Dysmenorrhea  Failed multiple medical and surgical therapies  BMI 37    ASSESSMENT/PLAN     28 y.o.  female who presents for scheduled total laparoscopic hysterectomy, bilateral salpingectomy and cystoscopy.    - Proceed to the OR for scheduled surgery  - Risks for the procedure reviewed  - SCDs for DVT ppx  - Antibiotics: Ancef 2g    HISTORY OF PRESENT ILLNESS     28 y.o.  female who presents for the scheduled procedure listed above.    She has no complaints today and there are no interval changes to the history.    REVIEW OF SYSTEMS: A complete review of systems was performed and was specifically negative for headache, changes in vision, RUQ pain, shortness of breath, chest pain, lower extremity edema and dysuria.     HISTORY:  Obstetrical History:  OB History    Para Term  AB Living   1 1 1     1   SAB IAB Ectopic Molar Multiple Live Births             1      # Outcome Date GA Lbr Keven/2nd Weight Sex Type Anes PTL Lv   1 Term 10/03/19 38w6d  3374 g (7 lb 7 oz) F Vag-Vacuum EPI N SHEYLA      Complications: Other       Past Medical History:  Past Medical History:   Diagnosis Date    Anxiety     Arthritis     Childhood asthma     Depression     Gallstones     GERD (gastroesophageal reflux disease)     PCOS (polycystic ovarian syndrome)     PONV (postoperative nausea and vomiting)     Seasonal allergies     Tattoo     x2    Urogenital trichomoniasis     Treated last year    UTI (urinary tract infection)     Wears glasses        Past Surgical History:  Past Surgical History:   Procedure Laterality Date    BILE DUCT STENT PLACEMENT  2020    DENTAL PROCEDURE      Age 4    DIAGNOSTIC LAPAROSCOPY N/A 2021    Procedure: DIAGNOSTIC LAPAROSCOPY;  Surgeon: Kelvin Hinton MD;  Location: [facility];  Service:  Obstetrics/Gynecology;  Laterality: N/A;    ENDOMETRIAL ABLATION  03/01/2024    cerene ablation    ENDOSCOPY      LAPAROSCOPIC CHOLECYSTECTOMY  03/2020    TUBAL COAGULATION LAPAROSCOPIC N/A 03/25/2022    Procedure: LAPAROSCOPIC BILATERAL TUBAL LIGATION, NEXPLANON REMOVAL. IUD INSERTION;  Surgeon: Kelvin Hinton MD;  Location: [facility];  Service: Obstetrics/Gynecology;  Laterality: N/A;       Social History:  Social History     Tobacco Use    Smoking status: Some Days     Current packs/day: 0.25     Average packs/day: 0.3 packs/day for 2.0 years (0.5 ttl pk-yrs)     Types: Cigarettes     Passive exposure: Never    Smokeless tobacco: Never    Tobacco comments:     1 pack per week   Vaping Use    Vaping status: Some Days    Substances: THC    Devices: Pre-filled or refillable cartridge, Delta 8   Substance Use Topics    Alcohol use: Never    Drug use: Yes     Types: Marijuana     Comment: about twice weekly       Family History  Family History   Adopted: Yes   Problem Relation Age of Onset    Heart murmur Other         Allergies:   Allergies   Allergen Reactions    Dilaudid [Hydromorphone] Nausea And Vomiting    Flexeril [Cyclobenzaprine] Dizziness and Confusion       OBJECTIVE   VITALS:  Temp:  [97.1 °F (36.2 °C)] 97.1 °F (36.2 °C)  Heart Rate:  [72] 72  Resp:  [17] 17  BP: (126)/(98) 126/98    PHYSICAL EXAM:  GENERAL: NAD, alert  CHEST: No increased work of breathing, CTAB  CV: RRR, WWP  ABDOMEN: Soft, NTTP  EXTREMITIES:  Warm and well-perfused, nontender, nonedematous  NEURO: AAO x 3, CN II-XII grossly intact    No current facility-administered medications on file prior to encounter.     Current Outpatient Medications on File Prior to Encounter   Medication Sig Dispense Refill    fluticasone (FLONASE) 50 MCG/ACT nasal spray 2 sprays into the nostril(s) as directed by provider Daily. (Patient taking differently: 2 sprays into the nostril(s) as directed by provider Daily As Needed.) 15.8 mL 0    LORazepam  "(Ativan) 0.5 MG tablet Take 1 tablet by mouth Every 8 (Eight) Hours As Needed for Anxiety. 15 tablet 1    OXcarbazepine (Trileptal) 150 MG tablet Take 1 tablet by mouth 2 (Two) Times a Day. (Patient taking differently: Take 2 tablets by mouth 2 (Two) Times a Day.) 60 tablet 2       DIAGNOSTIC STUDIES:        Invalid input(s): \"LABALB\", \"UA\"    No results found for this or any previous visit (from the past 24 hour(s)).    Kelvin Hinton MD  Obstetrics and Gynecology  [facility]     "

## 2024-08-29 NOTE — ANESTHESIA PREPROCEDURE EVALUATION
Anesthesia Evaluation     Patient summary reviewed and Nursing notes reviewed   history of anesthetic complications:  PONV difficult airway  NPO Solid Status: > 8 hours  NPO Liquid Status: > 8 hours           Airway   Mallampati: III  TM distance: >3 FB  Neck ROM: full  Large neck circumference and Possible difficult intubation  Dental      Pulmonary    (+) a smoker Current, cigarettes, asthma,shortness of breath, sleep apnea, decreased breath sounds  Cardiovascular     ECG reviewed    (+) RAMÍREZ, PVD  Past MI: per ekg. CAD: inc risk obese, radha.      Neuro/Psych  (+) psychiatric history Anxiety and Depression  GI/Hepatic/Renal/Endo    (+) obesity, morbid obesity, GERD, liver disease fatty liver diseaseDiabetes: inc risk.    Musculoskeletal     (+) arthralgias, myalgias  Abdominal   (+) obese   Substance History   (+) drug use     OB/GYN          Other   arthritis,     ROS/Med Hx Other: Hx of substance use   2023 sr sa ant infarct                     Anesthesia Plan    ASA 3     general     (Risks and benefits discussed including risk of aspiration, recall and dental damage. All patient questions answered.    Will continue with plan of care.)  intravenous induction     Anesthetic plan, risks, benefits, and alternatives have been provided, discussed and informed consent has been obtained with: patient.  Pre-procedure education provided      CODE STATUS:    Code Status (Patient has no pulse and is not breathing): CPR (Attempt to Resuscitate)  Medical Interventions (Patient has pulse or is breathing): Full Support

## 2024-08-29 NOTE — OP NOTE
Vishal Ibarra  : 1995  MRN: [id number]  CSN: [id number]  Date: 2024    Operative Report    Pre-op Diagnosis:  Abnormal uterine bleeding  Menorrhagia with irregular cycle  Chronic pelvic pain  Dysmenorrhea  Failed multiple medical and surgical therapies  BMI 37   Post-op Diagnosis:  Same   Procedure: Total laparoscopic hysterectomy  Bilateral salpingectomy  Cystoscopy    Surgeon:  Surgical assistant: JENNIFER Horn was responsible for performing the following activities: Retraction, Closing, Placing Dressing, and manipulation of laparoscopic instruments  and their skilled assistance was necessary for the success of this case.     OR Staff: Circulator: Maureen Stuart RN; Yousuf Frost RN  Scrub Person: Jeanne Malloy; Rosalba Santo, PCT     Anesthesia: General   Estimated Blood Loss: 150 mls   ABx: cefazolin 2 gms   Specimens:  Uterus, cervix and bilateral fallopian tube remnants       Complications: None         Findings:   Normal survey of the abdomen and pelvis with normal anatomy noted.  No evidence of endometriosis. Evidence of previous tubal ligation with minimal residual fallopian tube tissue present distally.  The bladder was normal in appearance with no signs of trauma and bilateral ureteral efflux noted on cystoscopy.    Description of Procedure:   After the appropriate time out and adequate dosing of her anesthesia, the patient was prepped and draped in the usual sterile fashion.  The patient was placed in the dorsal lithotomy position using Salvatore stirrups.  A bhardwaj catheter had been placed in the bladder for drainage during the procedure.  A heavy-weighted speculum and Holliday retractor were placed in the vagina.  The anterior lip of the cervix was grasped with a single tooth tenaculum.  The uterus was sounded to 8 cm.  The cervix was sequentially dilated.  Measurements were taken and an appropriate sized KAHLIL manipulator was placed in the usual fashion with  stay sutures at 3 and 9 O'Clock on the cervix. The manipulator was then attached to the Connectbright uterine positioning system and covered over with a sterile towel.     Attention was then turned to the abdomen.  An infraumbilical skin incision was made with the scalpel and a 5 mm trocar was inserted under direct visualization without any complications.  The abdomen was insufflated with CO2 being sure to keep the pressure less than 15 mmHg. The patient was placed in Trendelenburg position. Bilateral ancillary ports were placed with a 5 mm tocar in the left lower quadrant and a 11 mm port in the right lower quadrant with the aid of transillumination and after identification of the inferior epigastric vessels. The ports were placed under direct visualization without any complications and all entry sites were inspected and found to be atraumatic. The abdomen and pelvis were then explored with the above findings noted.  The ureters were identified bilaterally and noted to be out of the operative fields at all times during the case.      The left fallopian tube remnant was elevated with an atraumatic grasper and pulled medially.  The Harmonic scalpel was then used to separate the fallopian tube from the underlying mesosalpinx.  The fallopian tube remnant was fully excised and removed from the patient. The uterine-ovarian vessels were then transected using the Harmonic device.  The round ligament was then transected with the Harmonic and the anterior and posterior leaves of the broad ligament were developed.  Dissection was then carried anteriorly and inferiorly along the anterior leaf of the broad ligament to create a bladder flap. The uterine vasculature on this side was then skeletonized, cauterized and divided with the Harmonic scalpel.  Appropriate hemostasis was noted and the ovary was reinspected to ensure that its blood supply had not been disturbed. A similar process was repeated on the patient's right side without any  complications.     Additional development of the bladder flap was carried out using the Harmonic device and pull-tip sucker until a white fascial plane was noted. A circumferential incision was made with the Harmonic around the KAHLIL cup and the specimen was amputated and held in the vagina to maintain a pneumoperitoneum.  Bleeding points were cauterized with the bipolar Kleppinger device.  The vaginal cuff was then closed laparoscopically in a running fashion with #1 barbed Endostitch. Irrigation and aspiration were then performed.  Radha was applied along all surgical planes and adequate hemostasis was noted.  The cuff was inspected with a vaginal hand to ensure adequate closure. The pneumoperitoneum was released and trocars were removed under direct visualization.  The surgical specimens were retrieved, labeled and sent to pathology for review.  The abdominal incisions were inspected and noted to be hemostatic with minimal bovie electrocautery.  The incisions were closed with 3-0 monocryl in a subcuticular fashion.  Standard dressings were applied.     The bhardwaj catheter was removed and the cystoscope was introduced. Appropriate efflux of pyridium stained urine was noted from both ureters.  There was no evidence of bladder mucosal injury. A fresh bhardwaj catheter was re-anchored. All instruments were removed from the patient and all counts were correct at the end of the procedure.  There were no complications. The patient tolerated the procedure well.  She was taken to the postoperative recovery room in stable condition.    Kelvin Hinton MD  Obstetrics and Gynecology  [facility]

## 2024-08-29 NOTE — ANESTHESIA PROCEDURE NOTES
Airway  Urgency: elective    Date/Time: 8/29/2024 7:37 AM  Airway not difficult    General Information and Staff    Patient location during procedure: OR  CRNA/CAA: Eliezer Justice CRNA    Indications and Patient Condition  Indications for airway management: airway protection    Preoxygenated: yes  Mask difficulty assessment: 1 - vent by mask    Final Airway Details  Final airway type: endotracheal airway      Successful airway: ETT  Cuffed: yes   Successful intubation technique: direct laryngoscopy  Endotracheal tube insertion site: oral  Blade: Sharif  Blade size: 3  ETT size (mm): 7.0  Cormack-Lehane Classification: grade I - full view of glottis  Placement verified by: chest auscultation and capnometry   Measured from: lips  ETT/EBT  to lips (cm): 21  Number of attempts at approach: 1  Assessment: lips, teeth, and gum same as pre-op and atraumatic intubation

## 2024-08-29 NOTE — PLAN OF CARE
Goal Outcome Evaluation:   No s/s of distress.  Nausea/vomiting resolved, since arrival from recovery, with antiemetics.  Pain controlled with ordered medications.  Abdominal lap. Incisions remain cdi with steri strips.  Scant bleeding to shane pad.  La catheter removed per orders and is currently dtv.  Ambulated with no difficulties.

## 2024-08-30 VITALS
OXYGEN SATURATION: 94 % | TEMPERATURE: 98.4 F | RESPIRATION RATE: 18 BRPM | HEART RATE: 74 BPM | DIASTOLIC BLOOD PRESSURE: 79 MMHG | SYSTOLIC BLOOD PRESSURE: 116 MMHG

## 2024-08-30 PROBLEM — N93.9 ABNORMAL UTERINE BLEEDING: Status: RESOLVED | Noted: 2024-08-29 | Resolved: 2024-08-30

## 2024-08-30 LAB
ANION GAP SERPL CALCULATED.3IONS-SCNC: 10.1 MMOL/L (ref 5–15)
BASOPHILS # BLD AUTO: 0.03 10*3/MM3 (ref 0–0.2)
BASOPHILS NFR BLD AUTO: 0.3 % (ref 0–1.5)
BUN SERPL-MCNC: 11 MG/DL (ref 6–20)
BUN/CREAT SERPL: 15.7 (ref 7–25)
CALCIUM SPEC-SCNC: 9.5 MG/DL (ref 8.6–10.5)
CHLORIDE SERPL-SCNC: 102 MMOL/L (ref 98–107)
CO2 SERPL-SCNC: 23.9 MMOL/L (ref 22–29)
CREAT SERPL-MCNC: 0.7 MG/DL (ref 0.57–1)
DEPRECATED RDW RBC AUTO: 39.7 FL (ref 37–54)
EGFRCR SERPLBLD CKD-EPI 2021: 121.7 ML/MIN/1.73
EOSINOPHIL # BLD AUTO: 0.02 10*3/MM3 (ref 0–0.4)
EOSINOPHIL NFR BLD AUTO: 0.2 % (ref 0.3–6.2)
ERYTHROCYTE [DISTWIDTH] IN BLOOD BY AUTOMATED COUNT: 12.5 % (ref 12.3–15.4)
GLUCOSE SERPL-MCNC: 130 MG/DL (ref 65–99)
HCT VFR BLD AUTO: 40.3 % (ref 34–46.6)
HGB BLD-MCNC: 14.2 G/DL (ref 12–15.9)
IMM GRANULOCYTES # BLD AUTO: 0.02 10*3/MM3 (ref 0–0.05)
IMM GRANULOCYTES NFR BLD AUTO: 0.2 % (ref 0–0.5)
LYMPHOCYTES # BLD AUTO: 2.48 10*3/MM3 (ref 0.7–3.1)
LYMPHOCYTES NFR BLD AUTO: 24.4 % (ref 19.6–45.3)
MCH RBC QN AUTO: 30.9 PG (ref 26.6–33)
MCHC RBC AUTO-ENTMCNC: 35.2 G/DL (ref 31.5–35.7)
MCV RBC AUTO: 87.6 FL (ref 79–97)
MONOCYTES # BLD AUTO: 0.63 10*3/MM3 (ref 0.1–0.9)
MONOCYTES NFR BLD AUTO: 6.2 % (ref 5–12)
NEUTROPHILS NFR BLD AUTO: 6.99 10*3/MM3 (ref 1.7–7)
NEUTROPHILS NFR BLD AUTO: 68.7 % (ref 42.7–76)
NRBC BLD AUTO-RTO: 0 /100 WBC (ref 0–0.2)
PLATELET # BLD AUTO: 171 10*3/MM3 (ref 140–450)
PMV BLD AUTO: 10.7 FL (ref 6–12)
POTASSIUM SERPL-SCNC: 3.2 MMOL/L (ref 3.5–5.2)
RBC # BLD AUTO: 4.6 10*6/MM3 (ref 3.77–5.28)
RBC MORPH BLD: NORMAL
REF LAB TEST METHOD: NORMAL
SMALL PLATELETS BLD QL SMEAR: ADEQUATE
SODIUM SERPL-SCNC: 136 MMOL/L (ref 136–145)
WBC MORPH BLD: NORMAL
WBC NRBC COR # BLD AUTO: 10.17 10*3/MM3 (ref 3.4–10.8)

## 2024-08-30 PROCEDURE — 85025 COMPLETE CBC W/AUTO DIFF WBC: CPT | Performed by: OBSTETRICS & GYNECOLOGY

## 2024-08-30 PROCEDURE — 63710000001 ONDANSETRON ODT 4 MG TABLET DISPERSIBLE: Performed by: OBSTETRICS & GYNECOLOGY

## 2024-08-30 PROCEDURE — 85007 BL SMEAR W/DIFF WBC COUNT: CPT | Performed by: OBSTETRICS & GYNECOLOGY

## 2024-08-30 PROCEDURE — 80048 BASIC METABOLIC PNL TOTAL CA: CPT | Performed by: OBSTETRICS & GYNECOLOGY

## 2024-08-30 RX ORDER — ACETAMINOPHEN 500 MG
1000 TABLET ORAL EVERY 8 HOURS PRN
Qty: 60 TABLET | Refills: 0 | Status: SHIPPED | OUTPATIENT
Start: 2024-08-30 | End: 2025-08-30

## 2024-08-30 RX ORDER — IBUPROFEN 800 MG/1
800 TABLET, FILM COATED ORAL EVERY 8 HOURS PRN
Qty: 30 TABLET | Refills: 0 | Status: SHIPPED | OUTPATIENT
Start: 2024-08-30

## 2024-08-30 RX ORDER — ACETAMINOPHEN 500 MG
1000 TABLET ORAL EVERY 8 HOURS
Status: DISCONTINUED | OUTPATIENT
Start: 2024-08-30 | End: 2024-08-30 | Stop reason: HOSPADM

## 2024-08-30 RX ORDER — DOCUSATE SODIUM 100 MG/1
100 CAPSULE, LIQUID FILLED ORAL 2 TIMES DAILY
Qty: 60 CAPSULE | Refills: 1 | Status: SHIPPED | OUTPATIENT
Start: 2024-08-30

## 2024-08-30 RX ORDER — OXYCODONE HYDROCHLORIDE 5 MG/1
5 TABLET ORAL EVERY 6 HOURS PRN
Qty: 10 TABLET | Refills: 0 | Status: SHIPPED | OUTPATIENT
Start: 2024-08-30

## 2024-08-30 RX ORDER — ALPHA-D-GALACTOSIDASE 600 UNIT
CAPSULE ORAL DAILY
Qty: 368 G | Refills: 3 | Status: SHIPPED | OUTPATIENT
Start: 2024-08-30

## 2024-08-30 RX ADMIN — OXYCODONE HYDROCHLORIDE 10 MG: 5 TABLET ORAL at 02:55

## 2024-08-30 RX ADMIN — OXCARBAZEPINE 300 MG: 300 TABLET, FILM COATED ORAL at 08:00

## 2024-08-30 RX ADMIN — DOCUSATE SODIUM 100 MG: 100 CAPSULE, LIQUID FILLED ORAL at 08:00

## 2024-08-30 RX ADMIN — ONDANSETRON 4 MG: 4 TABLET, ORALLY DISINTEGRATING ORAL at 07:58

## 2024-08-30 RX ADMIN — FAMOTIDINE 20 MG: 20 TABLET, FILM COATED ORAL at 08:00

## 2024-08-30 RX ADMIN — IBUPROFEN 800 MG: 800 TABLET, FILM COATED ORAL at 04:17

## 2024-08-30 RX ADMIN — ACETAMINOPHEN 1000 MG: 500 TABLET, FILM COATED ORAL at 07:56

## 2024-08-30 RX ADMIN — OXYCODONE HYDROCHLORIDE 10 MG: 5 TABLET ORAL at 07:59

## 2024-08-30 RX ADMIN — ALUMINUM HYDROXIDE, MAGNESIUM HYDROXIDE, DIMETHICONE 15 ML: 400; 400; 40 SUSPENSION ORAL at 07:57

## 2024-08-30 NOTE — PROGRESS NOTES
Vishal Ibarra  : 1995  MRN: [id number]  CSN: [id number]    Post-operative Day #1  Subjective   Her pain is well controlled.  Her IV infiltrated last pm so she had to receive Demerol IM which resolved her pain. She is voiding without difficulty. She is passing gas and has not had a bowel movement.      Objective     Min/max vitals past 24 hours:   Temp  Min: 97.1 °F (36.2 °C)  Max: 98.7 °F (37.1 °C)  BP  Min: 104/69  Max: 142/59  Pulse  Min: 71  Max: 126  Resp  Min: 11  Max: 19        I/O last 3 completed shifts:  In: 1700 [I.V.:1600; IV Piggyback:100]  Out: 2975 [Urine:2925; Blood:50]    General: well developed; well nourished  no acute distress   Resp: breathing is unlabored   CV: Not performed.   Abdomen: soft, non-tender; no masses  incision is clean, dry, intact, and without drainage   Pelvic: Not performed   Ext: normal appearance with no cyanosis or edema and no calf tenderness       WBC   Date/Time Value Ref Range Status   2024 0618 10.17 3.40 - 10.80 10*3/mm3 Final   2019 1037 10.4 3.4 - 10.8 x10E3/uL Final     Hemoglobin   Date/Time Value Ref Range Status   2024 0618 14.2 12.0 - 15.9 g/dL Final     Hematocrit   Date/Time Value Ref Range Status   2024 0618 40.3 34.0 - 46.6 % Final     Platelets   Date/Time Value Ref Range Status   2024 0618 171 140 - 450 10*3/mm3 Final        Assessment   Post-op Day #1 S/P TLH, bilateral salpingectomy, cystoscopy     Plan   Continue routine post-operative care  Discharge to home    JASON Jerry  2024  08:58 EDT

## 2024-08-30 NOTE — PLAN OF CARE
Goal Outcome Evaluation:              Outcome Evaluation: VSS, adequate I/O, pt had uncontrolled pain with loss of IV access, provider was notified and IM pain medication was ordered, pt also experienced nausea at the beginning of the shift that subsided quickly, ambulating well with no signs of difficulty, incisions are clean, dry and intact, will continue care

## 2024-08-30 NOTE — PLAN OF CARE
Problem: Adult Inpatient Plan of Care  Goal: Plan of Care Review  Outcome: Met  Flowsheets (Taken 8/30/2024 1124)  Outcome Evaluation: VSS, I/O adequate, patient pain controlled with current pain medication regimen, no complaints at this time, no bleeding, all 3 lap sites are WDL, patient ambulating independently  Goal: Patient-Specific Goal (Individualized)  Outcome: Met  Goal: Absence of Hospital-Acquired Illness or Injury  Outcome: Met  Intervention: Identify and Manage Fall Risk  Recent Flowsheet Documentation  Taken 8/30/2024 0756 by Susan Martinez RN  Safety Promotion/Fall Prevention:   safety round/check completed   room organization consistent   nonskid shoes/slippers when out of bed   fall prevention program maintained   clutter free environment maintained   assistive device/personal items within reach   activity supervised  Intervention: Prevent and Manage VTE (Venous Thromboembolism) Risk  Recent Flowsheet Documentation  Taken 8/30/2024 0756 by Susan Martinez RN  Range of Motion: active ROM (range of motion) encouraged  Goal: Optimal Comfort and Wellbeing  Outcome: Met  Intervention: Monitor Pain and Promote Comfort  Recent Flowsheet Documentation  Taken 8/30/2024 0756 by Susan Martinez RN  Pain Management Interventions: see MAR  Intervention: Provide Person-Centered Care  Recent Flowsheet Documentation  Taken 8/30/2024 0756 by Susan Martinez RN  Trust Relationship/Rapport:   care explained   choices provided   emotional support provided   empathic listening provided   questions answered   questions encouraged   reassurance provided   thoughts/feelings acknowledged  Goal: Readiness for Transition of Care  Outcome: Met   Goal Outcome Evaluation:              Outcome Evaluation: VSS, I/O adequate, patient pain controlled with current pain medication regimen, no complaints at this time, no bleeding, all 3 lap sites are WDL, patient ambulating independently

## 2024-08-30 NOTE — DISCHARGE SUMMARY
Vishal Ibarra  : 1995  MRN: [id number]  CSN: [id number]    Discharge Summary      Date of Admission: 2024   Date of Discharge: 2024   Discharge Diagnosis: Abnormal uterine bleeding  Menorrhagia with irregular cycle  Chronic pelvic pain  Dysmenorrhea  Failed multiple medical and surgical therapies   Procedures Performed: Procedure(s):  TOTAL LAPAROSCOPIC HYSTERECTOMY, CYSTOSCOPY      Consults: none   Brief History: Patient is a 28 y.o.who presented to the hospital for definitive management of AUB and chronic pelvic pain. She has had a previous endometrial ablation   Hospital Course: On postop day #1, she is tolerating a regular diet and po pain meds. She is passing flatus and voiding without difficulty. She is ambulating in the hallway without difficulty. She feels ready for discharge.   Pending Studies: none   Condition at discharge: stable   Discharge Medications:    Your medication list        START taking these medications        Instructions Last Dose Given Next Dose Due   acetaminophen 500 MG tablet  Commonly known as: TYLENOL      Take 2 tablets by mouth Every 8 (Eight) Hours As Needed for Mild Pain.       Daily Fiber 43 % powder  Generic drug: Psyllium      Mix two teaspoons in 8 ounces of fluid and drink once daily       docusate sodium 100 MG capsule  Commonly known as: Colace      Take 1 capsule by mouth 2 (Two) Times a Day.       ibuprofen 800 MG tablet  Commonly known as: ADVIL,MOTRIN      Take 1 tablet by mouth Every 8 (Eight) Hours As Needed for Mild Pain for up to 30 doses.       naloxone 4 MG/0.1ML nasal spray  Commonly known as: NARCAN      Call 911. Don't prime. Lugoff in 1 nostril for overdose. Repeat in 2-3 minutes in other nostril if no or minimal breathing/responsiveness.       ondansetron 4 MG tablet  Commonly known as: ZOFRAN      Take 1 tablet by mouth Every 8 (Eight) Hours As Needed.       oxyCODONE 5 MG immediate release tablet  Commonly known as:  Roxicodone      Take 1 tablet by mouth Every 6 (Six) Hours As Needed for Severe Pain.              CONTINUE taking these medications        Instructions Last Dose Given Next Dose Due   fluticasone 50 MCG/ACT nasal spray  Commonly known as: FLONASE      2 sprays into the nostril(s) as directed by provider Daily.       LORazepam 0.5 MG tablet  Commonly known as: Ativan      Take 1 tablet by mouth Every 8 (Eight) Hours As Needed for Anxiety.       OXcarbazepine 150 MG tablet  Commonly known as: Trileptal      Take 1 tablet by mouth 2 (Two) Times a Day.                 Where to Get Your Medications        These medications were sent to [facility] [place] [id number]      Hours: Monday to Friday 8 AM to 6 PM Phone: [phone]   acetaminophen 500 MG tablet  Daily Fiber 43 % powder  docusate sodium 100 MG capsule  ibuprofen 800 MG tablet  naloxone 4 MG/0.1ML nasal spray  ondansetron 4 MG tablet  oxyCODONE 5 MG immediate release tablet        Discharge Disposition: home   Follow-up: Future Appointments   Date Time Provider Department Center   9/5/2024  2:40 PM [facility]          Time spent on discharge: 30 minutes or less  This note has been electronically signed.    Odalis Rodriges, APRN  August 30, 2024

## 2024-09-05 ENCOUNTER — OFFICE VISIT (OUTPATIENT)
Dept: OBSTETRICS AND GYNECOLOGY | Facility: CLINIC | Age: 29
End: 2024-09-05
Payer: MEDICAID

## 2024-09-05 VITALS
SYSTOLIC BLOOD PRESSURE: 120 MMHG | BODY MASS INDEX: 35.94 KG/M2 | HEIGHT: 67 IN | WEIGHT: 229 LBS | DIASTOLIC BLOOD PRESSURE: 84 MMHG

## 2024-09-05 DIAGNOSIS — Z90.710 S/P LAPAROSCOPIC HYSTERECTOMY: Primary | ICD-10-CM

## 2024-09-05 PROCEDURE — 99024 POSTOP FOLLOW-UP VISIT: CPT | Performed by: OBSTETRICS & GYNECOLOGY

## 2024-09-14 NOTE — PROGRESS NOTES
"Postoperative Assessment Visit    Subjective   Chief Complaint   Patient presents with    Post-op     7 days post op TLH, no complaints.       29 y.o.  female who presents for a post-op visit.    Pre-op Diagnosis:  Abnormal uterine bleeding  Menorrhagia with irregular cycle  Chronic pelvic pain  Dysmenorrhea  Failed multiple medical and surgical therapies  BMI 37   Post-op Diagnosis:  Same   Procedure: Total laparoscopic hysterectomy  Bilateral salpingectomy  Cystoscopy      The patient is doing well with no issues.     Objective   Vitals:    24 1448   BP: 120/84   Weight: 104 kg (229 lb)   Height: 170.2 cm (67\")     Physical Exam:  Incision(s): Well-healing, no signs of infection or separation  Reassuring postoperative exam       Medical Decision Making:    I have reviewed the operative note.  I have reviewed the postoperative labs where appropriate.    Assessment   TLH, BS  Post-op evaluation     Plan    No orders of the defined types were placed in this encounter.      Medication Management: none    Patient is meeting all post-op milestones.  Pathology reviewed   Surgical findings discussed.  Postoperative precautions and restrictions reviewed.    RTC for full postoperative exam in 4 weeks.    Kelvin Hinton MD  Obstetrics and Gynecology  [facility]    "

## 2024-11-22 ENCOUNTER — OFFICE VISIT (OUTPATIENT)
Dept: OBSTETRICS AND GYNECOLOGY | Facility: CLINIC | Age: 29
End: 2024-11-22
Payer: MEDICAID

## 2024-11-22 VITALS
BODY MASS INDEX: 35.94 KG/M2 | DIASTOLIC BLOOD PRESSURE: 84 MMHG | HEIGHT: 67 IN | SYSTOLIC BLOOD PRESSURE: 128 MMHG | WEIGHT: 229 LBS

## 2024-11-22 DIAGNOSIS — Z90.710 S/P LAPAROSCOPIC HYSTERECTOMY: ICD-10-CM

## 2024-11-22 DIAGNOSIS — E28.2 PCOS (POLYCYSTIC OVARIAN SYNDROME): Primary | ICD-10-CM

## 2024-11-22 RX ORDER — NORELGESTROMIN AND ETHINYL ESTRADIOL 35; 150 UG/MG; UG/MG
1 PATCH TRANSDERMAL WEEKLY
Qty: 12 PATCH | Refills: 5 | Status: SHIPPED | OUTPATIENT
Start: 2024-11-22 | End: 2025-11-22

## 2024-12-05 NOTE — PROGRESS NOTES
GYN Office Visit    Subjective   Chief Complaint   Patient presents with    Follow-up     Wants to discuss HRT- hot flashes, night sweats,     Nery Ibarra is a 29 y.o. year old  presenting for menopausal symptoms.    She reports significant hot flashes and night sweats.  She did undergo hysterectomy with ovarian preservation in August of this year.  No bleeding or pain symptoms now, which she is very pleased with, but struggling with these hormonal type symptoms. She is struggling with her weight as well.  History of PCOS.    OB Hx:  x 1  Pap smear: Normal cervix noted on hysterectomy specimen     Past Medical History:   Diagnosis Date    Anxiety     Arthritis     Childhood asthma     Depression     Gallstones     GERD (gastroesophageal reflux disease)     PCOS (polycystic ovarian syndrome)     PONV (postoperative nausea and vomiting)     Seasonal allergies     Tattoo     x2    Urogenital trichomoniasis     Treated last year    UTI (urinary tract infection)     Wears glasses        Past Surgical History:   Procedure Laterality Date    BILE DUCT STENT PLACEMENT  2020    DENTAL PROCEDURE      Age 4    DIAGNOSTIC LAPAROSCOPY N/A 2021    Procedure: DIAGNOSTIC LAPAROSCOPY;  Surgeon: Kelvin Hinton MD;  Location: [facility];  Service: Obstetrics/Gynecology;  Laterality: N/A;    ENDOMETRIAL ABLATION  2024    cerene ablation    ENDOSCOPY      LAPAROSCOPIC CHOLECYSTECTOMY  2020    TOTAL LAPAROSCOPIC HYSTERECTOMY N/A 2024    Procedure: TOTAL LAPAROSCOPIC HYSTERECTOMY, CYSTOSCOPY;  Surgeon: Kelvin Hinton MD;  Location: [facility];  Service: Obstetrics/Gynecology;  Laterality: N/A;    TUBAL COAGULATION LAPAROSCOPIC N/A 2022    Procedure: LAPAROSCOPIC BILATERAL TUBAL LIGATION, NEXPLANON REMOVAL. IUD INSERTION;  Surgeon: Kelvin Hinton MD;  Location: [facility];  Service: Obstetrics/Gynecology;  Laterality: N/A;       Family History   Adopted: Yes   Problem Relation  Age of Onset    Heart murmur Other         Social History     Tobacco Use    Smoking status: Some Days     Current packs/day: 0.25     Average packs/day: 0.3 packs/day for 2.0 years (0.5 ttl pk-yrs)     Types: Cigarettes     Passive exposure: Never    Smokeless tobacco: Never    Tobacco comments:     1 pack per week   Vaping Use    Vaping status: Some Days    Substances: THC    Devices: Pre-filled or refillable cartridge, Delta 8   Substance Use Topics    Alcohol use: Never    Drug use: Yes     Types: Marijuana     Comment: about twice weekly       (Not in a hospital admission)      Dilaudid [hydromorphone] and Flexeril [cyclobenzaprine]    Current Outpatient Medications on File Prior to Visit   Medication Sig Dispense Refill    acetaminophen (TYLENOL) 500 MG tablet Take 2 tablets by mouth Every 8 (Eight) Hours As Needed for Mild Pain. 60 tablet 0    docusate sodium (Colace) 100 MG capsule Take 1 capsule by mouth 2 (Two) Times a Day. 60 capsule 1    fluticasone (FLONASE) 50 MCG/ACT nasal spray 2 sprays into the nostril(s) as directed by provider Daily. (Patient taking differently: 2 sprays into the nostril(s) as directed by provider Daily As Needed.) 15.8 mL 0    ibuprofen (ADVIL,MOTRIN) 800 MG tablet Take 1 tablet by mouth Every 8 (Eight) Hours As Needed for Mild Pain for up to 30 doses. 30 tablet 0    LORazepam (Ativan) 0.5 MG tablet Take 1 tablet by mouth Every 8 (Eight) Hours As Needed for Anxiety. 15 tablet 1    naloxone (NARCAN) 4 MG/0.1ML nasal spray Call 911. Don't prime. Ranger in 1 nostril for overdose. Repeat in 2-3 minutes in other nostril if no or minimal breathing/responsiveness. 2 each 0    ondansetron (ZOFRAN) 4 MG tablet Take 1 tablet by mouth Every 8 (Eight) Hours As Needed. 30 tablet 0    OXcarbazepine (Trileptal) 150 MG tablet Take 1 tablet by mouth 2 (Two) Times a Day. (Patient taking differently: Take 2 tablets by mouth 2 (Two) Times a Day.) 60 tablet 2    oxyCODONE (Roxicodone) 5 MG immediate  "release tablet Take 1 tablet by mouth Every 6 (Six) Hours As Needed for Severe Pain. 10 tablet 0    Psyllium (Daily Fiber) 43 % powder Mix two teaspoons in 8 ounces of fluid and drink once daily 368 g 3     No current facility-administered medications on file prior to visit.       Social History    Tobacco Use      Smoking status: Some Days        Packs/day: 0.25        Years: 0.3 packs/day for 2.0 years (0.5 ttl pk-yrs)        Types: Cigarettes        Passive exposure: Never      Smokeless tobacco: Never      Tobacco comments: 1 pack per week         Objective   /84   Ht 170.2 cm (67\")   Wt 104 kg (229 lb)   LMP 08/28/2024 Comment: POC negative 08-29-24  BMI 35.87 kg/m²     Physical Exam:  General Appearance: alert, pleasant, appears stated age, interactive and cooperative         Medical Decision Making:    I reviewed the surgical pathology from her hysterectomy in August.    Assessment   PCOS  Hormone related symptoms  Previous hysterectomy with ovarian preservation       Plan    No orders of the defined types were placed in this encounter.      Medication Management: Ortho Evra patches    Procedures Performed: None    We reviewed her situation in detail today.  I recommend introducing hormonal therapy to hopefully improve her symptoms. Given her age, I would recommend standard dose contraceptives.  Patches are probably the easiest for maintenance.  Rx and instructions provided today.  All questions answered.    Kelvin Hinton MD  Obstetrics and Gynecology  [facility]   "

## (undated) DEVICE — SOL IRR H2O BTL 1000ML STRL

## (undated) DEVICE — HARMONIC 1100 SHEARS, 36CM SHAFT LENGTH: Brand: HARMONIC

## (undated) DEVICE — SOL IRR NACL 0.9PCT BT 1000ML

## (undated) DEVICE — ST IRR CYSTO W/SPK 77IN LF

## (undated) DEVICE — DRP ADAPT ALLY UTER POSTN SYS 1P/U

## (undated) DEVICE — MARKR SKIN W/RULR

## (undated) DEVICE — MANIP UTER ADVINCULA DELINEATOR 3.0CM

## (undated) DEVICE — RICH LAVH: Brand: MEDLINE INDUSTRIES, INC.

## (undated) DEVICE — ADHS LIQ MASTISOL 2/3ML

## (undated) DEVICE — PENCL ES MEGADINE EZ/CLEAN BUTN W/HOLSTR 10FT

## (undated) DEVICE — ST TBG PNEUMOCLEAR EVAC SMOKE HIFLO

## (undated) DEVICE — PATIENT RETURN ELECTRODE, SINGLE-USE, CONTACT QUALITY MONITORING, ADULT, WITH 9FT CORD, FOR PATIENTS WEIGING OVER 33LBS. (15KG): Brand: MEGADYNE

## (undated) DEVICE — 4-PORT MANIFOLD: Brand: NEPTUNE 2

## (undated) DEVICE — STRIP,CLOSURE,WOUND,MEDI-STRIP,1/2X4: Brand: MEDLINE

## (undated) DEVICE — GLV SURG BIOGEL PI ULTRATOUCH G SZ7.5 LF

## (undated) DEVICE — Device

## (undated) DEVICE — HDRST POSTN SLOTTED A/

## (undated) DEVICE — GLV SURG SENSICARE PI LF PF 7.5 GRN STRL

## (undated) DEVICE — APPL HEMO SURG ARISTA/AH/FLEXITIP XLR 38CM

## (undated) DEVICE — SUT MNCRYL PLS ANTIB UD 3/0 PS2 27IN

## (undated) DEVICE — SLV SCD CALF HEMOFORCE DVT THERP REPROC MD

## (undated) DEVICE — ENDOPATH XCEL BLADELESS TROCARS WITH STABILITY SLEEVES: Brand: ENDOPATH XCEL

## (undated) DEVICE — MONOPOLAR METZENBAUM SCISSOR, MINI BLADE TIP, DISPOSABLE: Brand: MONOPOLAR METZENBAUM SCISSOR, MINI BLADE TIP, DISPOSABLE

## (undated) DEVICE — PK MINOR LITHO 20

## (undated) DEVICE — SYR LUERLOK 50ML

## (undated) DEVICE — CATH FOLEY LUBRICATH 3WY 18F 30CC

## (undated) DEVICE — KIT,ANTI FOG,W/SPONGE & FLUID,SOFT PACK: Brand: MEDLINE

## (undated) DEVICE — DRAPE UNDERBUTTOCKS W FLUID POUCH 40X44IN

## (undated) DEVICE — CATHETER,FOLEY,100%SILICONE,16FR,10ML,LF: Brand: MEDLINE

## (undated) DEVICE — SPNG GZ WOVN 4X4IN 12PLY 10/BX STRL

## (undated) DEVICE — PLUG,CATHETER,DRAINAGE PROTECTOR,TUBE: Brand: MEDLINE

## (undated) DEVICE — ENDOPATH XCEL UNIVERSAL TROCAR STABLILITY SLEEVES: Brand: ENDOPATH XCEL

## (undated) DEVICE — TBG SXN CONN/F UNIV 1/4IN 12FT LF STRL

## (undated) DEVICE — SOL SCRUB STAT ENDURE 420 CIDASTAT 2PCT FM 4OZ

## (undated) DEVICE — PREMIUM WET SKIN PREP TRAY CHG: Brand: MEDLINE INDUSTRIES, INC.

## (undated) DEVICE — SUT VIC 0 CT 36IN J958H

## (undated) DEVICE — 2, DISPOSABLE SUCTION/IRRIGATOR WITHOUT DISPOSABLE TIP: Brand: STRYKEFLOW

## (undated) DEVICE — SYR LL TP 10ML STRL

## (undated) DEVICE — MANIP UTER RUMI TP 5.1MM 6CM LAV

## (undated) DEVICE — PAD STEEP TRENDELENBURG W/RAIL STRAP INTEGR ARM PROTECT WING